# Patient Record
Sex: FEMALE | Race: WHITE | NOT HISPANIC OR LATINO | Employment: FULL TIME | URBAN - METROPOLITAN AREA
[De-identification: names, ages, dates, MRNs, and addresses within clinical notes are randomized per-mention and may not be internally consistent; named-entity substitution may affect disease eponyms.]

---

## 2022-01-26 ENCOUNTER — HOSPITAL ENCOUNTER (EMERGENCY)
Facility: MEDICAL CENTER | Age: 57
End: 2022-01-26
Attending: EMERGENCY MEDICINE
Payer: COMMERCIAL

## 2022-01-26 ENCOUNTER — APPOINTMENT (OUTPATIENT)
Dept: RADIOLOGY | Facility: MEDICAL CENTER | Age: 57
End: 2022-01-26
Attending: EMERGENCY MEDICINE
Payer: COMMERCIAL

## 2022-01-26 VITALS
RESPIRATION RATE: 18 BRPM | WEIGHT: 208.78 LBS | HEART RATE: 105 BPM | DIASTOLIC BLOOD PRESSURE: 105 MMHG | HEIGHT: 65 IN | BODY MASS INDEX: 34.78 KG/M2 | SYSTOLIC BLOOD PRESSURE: 131 MMHG | TEMPERATURE: 97.9 F | OXYGEN SATURATION: 99 %

## 2022-01-26 DIAGNOSIS — K57.90 DIVERTICULOSIS: ICD-10-CM

## 2022-01-26 DIAGNOSIS — R10.32 LLQ ABDOMINAL PAIN: ICD-10-CM

## 2022-01-26 DIAGNOSIS — R00.0 TACHYCARDIA: ICD-10-CM

## 2022-01-26 LAB
ALBUMIN SERPL BCP-MCNC: 4.4 G/DL (ref 3.2–4.9)
ALBUMIN/GLOB SERPL: 1.4 G/DL
ALP SERPL-CCNC: 104 U/L (ref 30–99)
ALT SERPL-CCNC: 20 U/L (ref 2–50)
ANION GAP SERPL CALC-SCNC: 16 MMOL/L (ref 7–16)
APPEARANCE UR: CLEAR
AST SERPL-CCNC: 23 U/L (ref 12–45)
BASOPHILS # BLD AUTO: 0.7 % (ref 0–1.8)
BASOPHILS # BLD: 0.07 K/UL (ref 0–0.12)
BILIRUB SERPL-MCNC: 0.3 MG/DL (ref 0.1–1.5)
BILIRUB UR QL STRIP.AUTO: NEGATIVE
BUN SERPL-MCNC: 12 MG/DL (ref 8–22)
CALCIUM SERPL-MCNC: 9.8 MG/DL (ref 8.4–10.2)
CHLORIDE SERPL-SCNC: 103 MMOL/L (ref 96–112)
CO2 SERPL-SCNC: 22 MMOL/L (ref 20–33)
COLOR UR: YELLOW
CREAT SERPL-MCNC: 0.72 MG/DL (ref 0.5–1.4)
EKG IMPRESSION: NORMAL
EOSINOPHIL # BLD AUTO: 0.21 K/UL (ref 0–0.51)
EOSINOPHIL NFR BLD: 2.1 % (ref 0–6.9)
ERYTHROCYTE [DISTWIDTH] IN BLOOD BY AUTOMATED COUNT: 45.5 FL (ref 35.9–50)
GLOBULIN SER CALC-MCNC: 3.2 G/DL (ref 1.9–3.5)
GLUCOSE SERPL-MCNC: 94 MG/DL (ref 65–99)
GLUCOSE UR STRIP.AUTO-MCNC: NEGATIVE MG/DL
HCT VFR BLD AUTO: 46.3 % (ref 37–47)
HGB BLD-MCNC: 15.4 G/DL (ref 12–16)
IMM GRANULOCYTES # BLD AUTO: 0.04 K/UL (ref 0–0.11)
IMM GRANULOCYTES NFR BLD AUTO: 0.4 % (ref 0–0.9)
KETONES UR STRIP.AUTO-MCNC: 15 MG/DL
LACTATE BLD-SCNC: 1.4 MMOL/L (ref 0.5–2)
LEUKOCYTE ESTERASE UR QL STRIP.AUTO: NEGATIVE
LIPASE SERPL-CCNC: 20 U/L (ref 7–58)
LYMPHOCYTES # BLD AUTO: 2.65 K/UL (ref 1–4.8)
LYMPHOCYTES NFR BLD: 26.9 % (ref 22–41)
MCH RBC QN AUTO: 30.7 PG (ref 27–33)
MCHC RBC AUTO-ENTMCNC: 33.3 G/DL (ref 33.6–35)
MCV RBC AUTO: 92.4 FL (ref 81.4–97.8)
MICRO URNS: ABNORMAL
MONOCYTES # BLD AUTO: 0.85 K/UL (ref 0–0.85)
MONOCYTES NFR BLD AUTO: 8.6 % (ref 0–13.4)
NEUTROPHILS # BLD AUTO: 6.03 K/UL (ref 2–7.15)
NEUTROPHILS NFR BLD: 61.3 % (ref 44–72)
NITRITE UR QL STRIP.AUTO: NEGATIVE
NRBC # BLD AUTO: 0 K/UL
NRBC BLD-RTO: 0 /100 WBC
PH UR STRIP.AUTO: 6.5 [PH] (ref 5–8)
PLATELET # BLD AUTO: 381 K/UL (ref 164–446)
PMV BLD AUTO: 9.4 FL (ref 9–12.9)
POTASSIUM SERPL-SCNC: 3.8 MMOL/L (ref 3.6–5.5)
PROCALCITONIN SERPL-MCNC: 0.05 NG/ML
PROT SERPL-MCNC: 7.6 G/DL (ref 6–8.2)
PROT UR QL STRIP: NEGATIVE MG/DL
RBC # BLD AUTO: 5.01 M/UL (ref 4.2–5.4)
RBC UR QL AUTO: NEGATIVE
SODIUM SERPL-SCNC: 141 MMOL/L (ref 135–145)
SP GR UR STRIP.AUTO: <=1.005
WBC # BLD AUTO: 9.9 K/UL (ref 4.8–10.8)

## 2022-01-26 PROCEDURE — 700111 HCHG RX REV CODE 636 W/ 250 OVERRIDE (IP): Performed by: EMERGENCY MEDICINE

## 2022-01-26 PROCEDURE — 93005 ELECTROCARDIOGRAM TRACING: CPT | Performed by: EMERGENCY MEDICINE

## 2022-01-26 PROCEDURE — 93005 ELECTROCARDIOGRAM TRACING: CPT

## 2022-01-26 PROCEDURE — 80053 COMPREHEN METABOLIC PANEL: CPT

## 2022-01-26 PROCEDURE — 96374 THER/PROPH/DIAG INJ IV PUSH: CPT

## 2022-01-26 PROCEDURE — A9270 NON-COVERED ITEM OR SERVICE: HCPCS | Performed by: EMERGENCY MEDICINE

## 2022-01-26 PROCEDURE — 83605 ASSAY OF LACTIC ACID: CPT

## 2022-01-26 PROCEDURE — 74177 CT ABD & PELVIS W/CONTRAST: CPT

## 2022-01-26 PROCEDURE — 700117 HCHG RX CONTRAST REV CODE 255: Performed by: EMERGENCY MEDICINE

## 2022-01-26 PROCEDURE — 84145 PROCALCITONIN (PCT): CPT

## 2022-01-26 PROCEDURE — 700105 HCHG RX REV CODE 258: Performed by: EMERGENCY MEDICINE

## 2022-01-26 PROCEDURE — 700102 HCHG RX REV CODE 250 W/ 637 OVERRIDE(OP): Performed by: EMERGENCY MEDICINE

## 2022-01-26 PROCEDURE — 85025 COMPLETE CBC W/AUTO DIFF WBC: CPT

## 2022-01-26 PROCEDURE — 81003 URINALYSIS AUTO W/O SCOPE: CPT

## 2022-01-26 PROCEDURE — 99285 EMERGENCY DEPT VISIT HI MDM: CPT

## 2022-01-26 PROCEDURE — 83690 ASSAY OF LIPASE: CPT

## 2022-01-26 RX ORDER — SODIUM CHLORIDE 9 MG/ML
1000 INJECTION, SOLUTION INTRAVENOUS ONCE
Status: COMPLETED | OUTPATIENT
Start: 2022-01-26 | End: 2022-01-26

## 2022-01-26 RX ORDER — KETOROLAC TROMETHAMINE 30 MG/ML
30 INJECTION, SOLUTION INTRAMUSCULAR; INTRAVENOUS ONCE
Status: COMPLETED | OUTPATIENT
Start: 2022-01-26 | End: 2022-01-26

## 2022-01-26 RX ORDER — AMOXICILLIN AND CLAVULANATE POTASSIUM 875; 125 MG/1; MG/1
1 TABLET, FILM COATED ORAL ONCE
Status: COMPLETED | OUTPATIENT
Start: 2022-01-26 | End: 2022-01-26

## 2022-01-26 RX ORDER — AMOXICILLIN AND CLAVULANATE POTASSIUM 875; 125 MG/1; MG/1
1 TABLET, FILM COATED ORAL 2 TIMES DAILY
Qty: 14 TABLET | Refills: 0 | Status: SHIPPED | OUTPATIENT
Start: 2022-01-26 | End: 2022-02-02

## 2022-01-26 RX ORDER — IBUPROFEN 600 MG/1
600 TABLET ORAL EVERY 6 HOURS PRN
Qty: 30 TABLET | Refills: 0 | Status: SHIPPED | OUTPATIENT
Start: 2022-01-26 | End: 2022-02-22

## 2022-01-26 RX ADMIN — SODIUM CHLORIDE 1000 ML: 9 INJECTION, SOLUTION INTRAVENOUS at 20:00

## 2022-01-26 RX ADMIN — AMOXICILLIN AND CLAVULANATE POTASSIUM 1 TABLET: 875; 125 TABLET, FILM COATED ORAL at 21:00

## 2022-01-26 RX ADMIN — IOHEXOL 100 ML: 350 INJECTION, SOLUTION INTRAVENOUS at 20:17

## 2022-01-26 RX ADMIN — KETOROLAC TROMETHAMINE 30 MG: 30 INJECTION, SOLUTION INTRAMUSCULAR at 21:00

## 2022-01-27 NOTE — ED NOTES
PT cleared for DC home pt to f/u with pcp & cardio pt verbalized understanding education for new rx provided pt verbalized understanding no further questions vss upon dc pt to return to ED for any worsening symptoms

## 2022-01-27 NOTE — ED PROVIDER NOTES
ED Provider Note    ED Provider Note    Primary care provider: No primary care provider on file.  Means of arrival: Walk-in  History obtained from: Patient    CHIEF COMPLAINT  Chief Complaint   Patient presents with   • Flank Pain     Lt since this am   • Abdominal Pain     LLQ Hx diverticulitis To U/C and referred to ER for tachycardia     Seen at 7:22 PM.   HPI  Nel Terry is a 56 y.o. female with history of diverticulitis (diagnosed clinically most recently in 2019 , patient does have known history of diverticular disease seen on prior colonoscopies) who presents to the Emergency Department with gradual onset of moderate left lower quadrant abdominal pain and low back pain.  The left lower quadrant abdominal pain is similar to her diverticulitis flare she had in the past but this time it is a copy with back pain which is new for her.  She denies any fevers, chills, chest pain, shortness of breath, numbness, focal weakness, hematochezia, melena, dysuria or hematuria.  She denies any history of renal colic.    She also has a diagnosis of resting tachycardia for which she takes 25 mg of metoprolol nightly.  She states that her heart rate today is a little bit higher than usual for her as she has been compliant with her beta-blocker.  She has never seen cardiology for this, she has never had a 2D echo.  She was raised in the [place] area, moved to [place] and has now moved back to [place] in the past month.  She has not established a primary care physician as of yet.    REVIEW OF SYSTEMS  See HPI,   Remainder of ROS negative.     PAST MEDICAL HISTORY   has a past medical history of Diverticulitis and Tachycardia.    SURGICAL HISTORY   has a past surgical history that includes gyn surgery; other orthopedic surgery; and other.    SOCIAL HISTORY  Social History     Tobacco Use   • Smoking status: Former Smoker   • Smokeless tobacco: Never Used   Vaping Use   • Vaping Use: Never used   Substance Use Topics   •  "Alcohol use: Yes   • Drug use: Never      Social History     Substance and Sexual Activity   Drug Use Never       FAMILY HISTORY  History reviewed. No pertinent family history.    CURRENT MEDICATIONS  Reviewed.  See Encounter Summary.     ALLERGIES  No Known Allergies    PHYSICAL EXAM  VITAL SIGNS: /105   Pulse (!) 105   Temp 36.6 °C (97.9 °F) (Temporal)   Resp 18   Ht 1.651 m (5' 5\")   Wt 94.7 kg (208 lb 12.4 oz)   SpO2 99%   Breastfeeding No   BMI 34.74 kg/m²   Constitutional: Awake, alert in no apparent distress.  HENT: Normocephalic, Bilateral external ears normal. Nose normal.   Eyes: Conjunctiva normal, non-icteric, EOMI.    Thorax & Lungs: Easy unlabored respirations, Clear to ascultation bilaterally.  Cardiovascular: Tachycardic, No murmurs, rubs or gallops. Bilateral pulses symmetrical.   Abdomen:  Soft, nontender, nondistended, normal active bowel sounds.   :    Skin: Visualized skin is  Dry, No erythema, No rash.   Musculoskeletal:   No cyanosis, clubbing or edema. No leg asymmetry.   Neurologic: Alert, Grossly non-focal.   Psychiatric: Normal affect, Normal mood  Lymphatic:  No cervical LAD    EKG   12 lead Interpreted by me  Rhythm: Sinus tach  Rate: 110  Axis: normal  Ectopy: none  Conduction: normal  ST Segments: no acute change  T Waves: no acute change  Clinical Impression: Sinus tachycardia, otherwise normal EKG without acute changes     RADIOLOGY  CT-ABDOMEN-PELVIS WITH   Final Result      1.  Distal colonic diverticuli without compelling evidence of diverticulitis   2.  Small hiatal hernia            COURSE & MEDICAL DECISION MAKING  Pertinent Labs & Imaging studies reviewed. (See chart for details)    Differential diagnoses include but are not limited to: Sepsis, diverticulitis, less likely ovarian cysts, mass, ureterolithiasis    7:22 PM - Medical record reviewed, patient seen and examined at bedside.    Decision Making:  This is a pleasant 56 y.o. year old female who presents " with left lower quadrant abdominal pain, prior history of diverticular disease, prior clinically diagnosed diverticulitis.  The patient has never had a CT scan in our system and states that she has not ever had a CT scan to diagnose her with diverticulitis in the past, therefore it was ordered today to verify the diagnosis.  The CT is actually unremarkable, she does not have any findings suggestive of diverticulitis.  Her symptoms have been only present for fewer than 12 hours at this time, therefore is possible that she is in the early phase of diverticulitis and has not developed any type of fat stranding that can be seen on imaging.    Labs are unremarkable.  At this time I will treat the patient for diverticulitis with a week of Augmentin.  She should note significant improvement in her symptoms in the next 24 to 48 hours.    Discharge Medications:  Discharge Medication List as of 1/26/2022  9:45 PM      START taking these medications    Details   amoxicillin-clavulanate (AUGMENTIN) 875-125 MG Tab Take 1 Tablet by mouth 2 times a day for 7 days., Disp-14 Tablet, R-0, Normal      ibuprofen (MOTRIN) 600 MG Tab Take 1 Tablet by mouth every 6 hours as needed., Disp-30 Tablet, R-0, Normal             The patient was discharged home (see d/c instructions) was told to return immediately for any signs or symptoms listed, or any worsening at all.  The patient verbally agreed to the discharge precautions and follow-up plan which is documented in EPIC.        FINAL IMPRESSION  1. LLQ abdominal pain    2. Diverticulosis    3. Tachycardia

## 2022-02-21 ENCOUNTER — TELEPHONE (OUTPATIENT)
Dept: SCHEDULING | Facility: IMAGING CENTER | Age: 57
End: 2022-02-21

## 2022-02-22 ENCOUNTER — HOSPITAL ENCOUNTER (OUTPATIENT)
Facility: MEDICAL CENTER | Age: 57
End: 2022-02-22
Attending: NURSE PRACTITIONER
Payer: COMMERCIAL

## 2022-02-22 ENCOUNTER — OFFICE VISIT (OUTPATIENT)
Dept: URGENT CARE | Facility: CLINIC | Age: 57
End: 2022-02-22
Payer: COMMERCIAL

## 2022-02-22 VITALS
SYSTOLIC BLOOD PRESSURE: 124 MMHG | HEART RATE: 94 BPM | TEMPERATURE: 98.5 F | WEIGHT: 205 LBS | RESPIRATION RATE: 20 BRPM | OXYGEN SATURATION: 94 % | BODY MASS INDEX: 35 KG/M2 | HEIGHT: 64 IN | DIASTOLIC BLOOD PRESSURE: 82 MMHG

## 2022-02-22 DIAGNOSIS — K52.9 GASTROENTERITIS: ICD-10-CM

## 2022-02-22 DIAGNOSIS — R19.7 DIARRHEA, UNSPECIFIED TYPE: ICD-10-CM

## 2022-02-22 PROCEDURE — 87045 FECES CULTURE AEROBIC BACT: CPT

## 2022-02-22 PROCEDURE — 87493 C DIFF AMPLIFIED PROBE: CPT

## 2022-02-22 PROCEDURE — 99204 OFFICE O/P NEW MOD 45 MIN: CPT | Performed by: NURSE PRACTITIONER

## 2022-02-22 PROCEDURE — 87899 AGENT NOS ASSAY W/OPTIC: CPT | Mod: 91

## 2022-02-22 RX ORDER — DIPHENHYDRAMINE HCL 25 MG
25 CAPSULE ORAL
COMMUNITY
End: 2022-02-22

## 2022-02-22 RX ORDER — METOPROLOL SUCCINATE 25 MG/1
25 TABLET, EXTENDED RELEASE ORAL DAILY
COMMUNITY
Start: 2021-11-03 | End: 2022-05-02

## 2022-02-22 RX ORDER — DIPHENOXYLATE HYDROCHLORIDE AND ATROPINE SULFATE 2.5; .025 MG/1; MG/1
1 TABLET ORAL 3 TIMES DAILY
Qty: 10 TABLET | Refills: 0 | Status: SHIPPED | OUTPATIENT
Start: 2022-02-22 | End: 2022-03-01

## 2022-02-22 RX ORDER — ESTRADIOL 0.07 MG/D
1 PATCH TRANSDERMAL
COMMUNITY
Start: 2022-02-11 | End: 2023-03-01 | Stop reason: SDUPTHER

## 2022-02-22 RX ORDER — ALUMINA, MAGNESIA, AND SIMETHICONE 2400; 2400; 240 MG/30ML; MG/30ML; MG/30ML
10 SUSPENSION ORAL 4 TIMES DAILY PRN
COMMUNITY
End: 2022-03-01

## 2022-02-22 RX ORDER — PROGESTERONE 200 MG/1
1 CAPSULE ORAL EVERY EVENING
COMMUNITY
Start: 2021-10-21 | End: 2023-03-01 | Stop reason: SDUPTHER

## 2022-02-22 ASSESSMENT — ENCOUNTER SYMPTOMS
FEVER: 0
NAUSEA: 0
SENSORY CHANGE: 0
BLOOD IN STOOL: 0
DIARRHEA: 1
CHILLS: 0
HEADACHES: 0
VOMITING: 0
ABDOMINAL PAIN: 0
CONSTIPATION: 0

## 2022-02-22 ASSESSMENT — FIBROSIS 4 INDEX: FIB4 SCORE: 0.74

## 2022-02-23 NOTE — PROGRESS NOTES
"Nel Terry is a 56 y.o. female who presents for Diarrhea (X 5 days, gurgles as she eats anything and then has to go, lower abdominal pain, last visit: 01/26/22)      HPI diarrhea occurring wakes her up night . For the past five nights she has been woke up with diarrhea 4-5 times/ night. Appetite less than normal.   Has lost about 12 pounds in the past month but has just moved and her routines have changed. Her stomach gurgles loudly prior to having diarrhea.    No recent travel.   Very Mild abd discomfort \" not pain\"  on the Left lower abd.   Urinating normally. Is able to stay hydrated.   Treatments tried: imodium AD, Gas X, pérez seltzer. Nothing really working. Watching what she eats. No ETOH or caffeine. No spicy foods or tomatos.     Has had 4 colonoscopy's in her life. Last time 2018. Had polyps.   Hx diverticulitis. - had it last month and was given Augmentin. Pain improved with time more than medication.     Review of Systems   Constitutional: Negative for chills and fever.   Gastrointestinal: Positive for diarrhea. Negative for abdominal pain, blood in stool, constipation, melena, nausea and vomiting.   Neurological: Negative for sensory change and headaches.       Allergies:     No Known Allergies    PMSFS Hx:  Past Medical History:   Diagnosis Date   • Diverticulitis    • Tachycardia      Past Surgical History:   Procedure Laterality Date   • GYN SURGERY      Bladder sling   • OTHER      Sinus surg   • OTHER ORTHOPEDIC SURGERY      Rt rotator cuff repair     History reviewed. No pertinent family history.  Social History     Tobacco Use   • Smoking status: Former Smoker   • Smokeless tobacco: Never Used   Substance Use Topics   • Alcohol use: Not Currently       Problems:   There is no problem list on file for this patient.      Medications:   Current Outpatient Medications on File Prior to Visit   Medication Sig Dispense Refill   • Cholecalciferol 2000 UNIT Cap Take 1 Tablet by mouth every day.   " "  • estradiol (CLIMARA) 0.075 MG/24HR PATCH WEEKLY Place 1 Patch on the skin.     • metoprolol SR (TOPROL XL) 25 MG TABLET SR 24 HR Take 25 mg by mouth every day.     • progesterone (PROMETRIUM) 200 MG capsule Take 1 Capsule by mouth every evening.     • aspirin effervescent (GORDON-SELTZER) 325 MG Effer Tab Take 1 Tablet by mouth every 6 hours as needed.     • mag hydrox-al hydrox-simeth (MAALOX PLUS ES OR MYLANTA DS) 400-400-40 MG/5ML Suspension Take 10 mL by mouth 4 times a day as needed.     • Magnesium Hydroxide (DULCOLAX PO) Take  by mouth.       No current facility-administered medications on file prior to visit.          Objective:     /82 (BP Location: Left arm, Patient Position: Sitting, BP Cuff Size: Adult)   Pulse 94   Temp 36.9 °C (98.5 °F) (Temporal)   Resp 20   Ht 1.626 m (5' 4\")   Wt 93 kg (205 lb)   SpO2 94%   Breastfeeding No   BMI 35.19 kg/m²     Physical Exam  Vitals and nursing note reviewed.   Constitutional:       General: She is in acute distress.      Appearance: Normal appearance. She is well-developed. She is not toxic-appearing.   HENT:      Head: Normocephalic.      Right Ear: Hearing normal.      Left Ear: Hearing normal.      Mouth/Throat:      Pharynx: Uvula midline.   Eyes:      General: Lids are normal.      Pupils: Pupils are equal, round, and reactive to light.   Neck:      Trachea: Trachea and phonation normal.   Cardiovascular:      Rate and Rhythm: Normal rate and regular rhythm.      Pulses: Normal pulses.   Pulmonary:      Effort: Pulmonary effort is normal.   Chest:   Breasts:      Right: No supraclavicular adenopathy.      Left: No supraclavicular adenopathy.       Abdominal:      General: There is no distension.      Palpations: Abdomen is soft.      Tenderness: There is no abdominal tenderness. There is no right CVA tenderness, left CVA tenderness, guarding or rebound.      Hernia: No hernia is present.   Musculoskeletal:      Cervical back: Full passive range " of motion without pain and normal range of motion.   Lymphadenopathy:      Cervical: No cervical adenopathy.      Upper Body:      Right upper body: No supraclavicular adenopathy.      Left upper body: No supraclavicular adenopathy.   Skin:     General: Skin is warm and dry.      Capillary Refill: Capillary refill takes less than 2 seconds.      Findings: No rash.   Neurological:      Mental Status: She is alert and oriented to person, place, and time.   Psychiatric:         Mood and Affect: Mood normal.         Speech: Speech normal.         Behavior: Behavior normal. Behavior is cooperative.         Thought Content: Thought content normal.         Assessment /Associated Orders:      1. Gastroenteritis     2. Diarrhea, unspecified type  CULTURE STOOL    C Diff by PCR rflx Toxin    diphenoxylate-atropine (LOMOTIL) 2.5-0.025 MG Tab           Medical Decision Making:    Pt is clinically stable at today's acute urgent care visit.  No acute distress noted. Appropriate for outpatient management at this time.   Acute problem today with uncertain prognosis.   Stool culture- pending   Cdiff- pending ( recent antibiotic use for diverticulitis)   Has appt with PCP to establish care on 02/28/22   Has appt with cardiology on 03/03/22   Keep appt as scheduled.     Discussed  the etiology and pathophysiology of gastroenteritis.  This is a viral illness.   If vomiting may start with clear liquid diet for 24 hours taking small sips very frequently.  May use pedialyte, Gatorade, ginger ale, or sprite.  After 24 hours and vomiting has subsided may start a bland diet such as bananas, rice, applesauce, toast, crackers, mashed potatoes, chicken noodle soup, cream of wheat.   Take to ER for signs of dehydration, increased pain or vomiting with inability to keep fluids down.  Discussed s/s of dehydration including dry sticky mouth, no urine in 8 hrs.  Go to ER if fever,  bloody vomit or diarrhea, diarrhea greater than 10 days, vomiting  greater than 3 days or new or worsening symptoms.     Educated in proper administration of medication(s) ordered today including safety, possible SE, risks, benefits, rationale and alternatives to therapy.   Consider probiotics       Advised to follow-up with the primary care provider for recheck, reevaluation, and consideration of further management if necessary.   Discussed management options (risks,benefits, and alternatives to treatment). Expressed understanding and the treatment plan was agreed upon. Questions were encouraged and answered   Return to urgent care prn if new or worsening sx or if there is no improvement in condition prn.    Educated in Red flags and indications to immediately call 911 or present to the Emergency Department.     I personally reviewed prior external notes and test results pertinent to today's visit.  I have independently reviewed and interpreted all diagnostics ordered during this urgent care acute visit.   Review of previous CT, ER visit and labs and ERP note.

## 2022-02-23 NOTE — PATIENT INSTRUCTIONS
Viral Gastroenteritis, Adult    Viral gastroenteritis is also known as the stomach flu. This condition may affect your stomach, small intestine, and large intestine. It can cause sudden watery diarrhea, fever, and vomiting. This condition is caused by many different viruses. These viruses can be passed from person to person very easily (are contagious).  Diarrhea and vomiting can make you feel weak and cause you to become dehydrated. You may not be able to keep fluids down. Dehydration can make you tired and thirsty, cause you to have a dry mouth, and decrease how often you urinate. It is important to replace the fluids that you lose from diarrhea and vomiting.  What are the causes?  Gastroenteritis is caused by many viruses, including rotavirus and norovirus. Norovirus is the most common cause in adults. You can get sick after being exposed to the viruses from other people. You can also get sick by:  · Eating food, drinking water, or touching a surface contaminated with one of these viruses.  · Sharing utensils or other personal items with an infected person.  What increases the risk?  You are more likely to develop this condition if you:  · Have a weak body defense system (immune system).  · Live with one or more children who are younger than 2 years old.  · Live in a nursing home.  · Travel on cruise ships.  What are the signs or symptoms?  Symptoms of this condition start suddenly 1-3 days after exposure to a virus. Symptoms may last for a few days or for as long as a week. Common symptoms include watery diarrhea and vomiting. Other symptoms include:  · Fever.  · Headache.  · Fatigue.  · Pain in the abdomen.  · Chills.  · Weakness.  · Nausea.  · Muscle aches.  · Loss of appetite.  How is this diagnosed?  This condition is diagnosed with a medical history and physical exam. You may also have a stool test to check for viruses or other infections.  How is this treated?  This condition typically goes away on its  own. The focus of treatment is to prevent dehydration and restore lost fluids (rehydration). This condition may be treated with:  · An oral rehydration solution (ORS) to replace important salts and minerals (electrolytes) in your body. Take this if told by your health care provider. This is a drink that is sold at pharmacies and retail stores.  · Medicines to help with your symptoms.  · Probiotic supplements to reduce symptoms of diarrhea.  · Fluids given through an IV, if dehydration is severe.  Older adults and people with other diseases or a weak immune system are at higher risk for dehydration.  Follow these instructions at home:    Eating and drinking    · Take an ORS as told by your health care provider.  · Drink clear fluids in small amounts as you are able. Clear fluids include:  ? Water.  ? Ice chips.  ? Diluted fruit juice.  ? Low-calorie sports drinks.  · Drink enough fluid to keep your urine pale yellow.  · Eat small amounts of healthy foods every 3-4 hours as you are able. This may include whole grains, fruits, vegetables, lean meats, and yogurt.  · Avoid fluids that contain a lot of sugar or caffeine, such as energy drinks, sports drinks, and soda.  · Avoid spicy or fatty foods.  · Avoid alcohol.  General instructions  · Wash your hands often, especially after having diarrhea or vomiting. If soap and water are not available, use hand .  · Make sure that all people in your household wash their hands well and often.  · Take over-the-counter and prescription medicines only as told by your health care provider.  · Rest at home while you recover.  · Watch your condition for any changes.  · Take a warm bath to relieve any burning or pain from frequent diarrhea episodes.  · Keep all follow-up visits as told by your health care provider. This is important.  Contact a health care provider if you:  · Cannot keep fluids down.  · Have symptoms that get worse.  · Have new symptoms.  · Feel light-headed or  dizzy.  · Have muscle cramps.  Get help right away if you:  · Have chest pain.  · Feel extremely weak or you faint.  · See blood in your vomit.  · Have vomit that looks like coffee grounds.  · Have bloody or black stools or stools that look like tar.  · Have a severe headache, a stiff neck, or both.  · Have a rash.  · Have severe pain, cramping, or bloating in your abdomen.  · Have trouble breathing or you are breathing very quickly.  · Have a fast heartbeat.  · Have skin that feels cold and clammy.  · Feel confused.  · Have pain when you urinate.  · Have signs of dehydration, such as:  ? Dark urine, very little urine, or no urine.  ? Cracked lips.  ? Dry mouth.  ? Sunken eyes.  ? Sleepiness.  ? Weakness.  Summary  · Viral gastroenteritis is also known as the stomach flu. It can cause sudden watery diarrhea, fever, and vomiting.  · This condition can be passed from person to person very easily (is contagious).  · Take an ORS if told by your health care provider. This is a drink that is sold at pharmacies and retail stores.  · Wash your hands often, especially after having diarrhea or vomiting. If soap and water are not available, use hand .  This information is not intended to replace advice given to you by your health care provider. Make sure you discuss any questions you have with your health care provider.  Document Released: 12/18/2006 Document Revised: 10/23/2019 Document Reviewed: 10/23/2019  [employer] Patient Education © 2020 [employer]

## 2022-02-24 ENCOUNTER — TELEPHONE (OUTPATIENT)
Dept: CARDIOLOGY | Facility: MEDICAL CENTER | Age: 57
End: 2022-02-24
Payer: COMMERCIAL

## 2022-02-24 DIAGNOSIS — R19.7 DIARRHEA, UNSPECIFIED TYPE: ICD-10-CM

## 2022-02-24 LAB
C DIFF DNA SPEC QL NAA+PROBE: NEGATIVE
C DIFF TOX GENS STL QL NAA+PROBE: NEGATIVE

## 2022-02-24 NOTE — TELEPHONE ENCOUNTER
Spoke with pt and confirmed that this will be first time meeting with a cardiologist. Confirmed with pt that recent testing in in chart. Confirmed with pt that she had some cardiac workup through her old primary care office at [facility]. All records are available through Care Everywhere in chart.    Appt date and time confirmed.

## 2022-02-25 LAB
E COLI SXT1+2 STL IA: NORMAL
SIGNIFICANT IND 70042: NORMAL
SITE SITE: NORMAL
SOURCE SOURCE: NORMAL

## 2022-02-26 LAB
BACTERIA STL CULT: NORMAL
C JEJUNI+C COLI AG STL QL: NORMAL
E COLI SXT1+2 STL IA: NORMAL
SIGNIFICANT IND 70042: NORMAL
SITE SITE: NORMAL
SOURCE SOURCE: NORMAL

## 2022-03-01 ENCOUNTER — OFFICE VISIT (OUTPATIENT)
Dept: MEDICAL GROUP | Facility: LAB | Age: 57
End: 2022-03-01
Payer: COMMERCIAL

## 2022-03-01 ENCOUNTER — HOSPITAL ENCOUNTER (OUTPATIENT)
Dept: LAB | Facility: MEDICAL CENTER | Age: 57
End: 2022-03-01
Attending: NURSE PRACTITIONER
Payer: COMMERCIAL

## 2022-03-01 VITALS
HEIGHT: 64 IN | TEMPERATURE: 97.7 F | RESPIRATION RATE: 14 BRPM | DIASTOLIC BLOOD PRESSURE: 66 MMHG | BODY MASS INDEX: 34.31 KG/M2 | OXYGEN SATURATION: 93 % | SYSTOLIC BLOOD PRESSURE: 114 MMHG | WEIGHT: 201 LBS | HEART RATE: 116 BPM

## 2022-03-01 DIAGNOSIS — R76.8 ANTINUCLEAR FACTOR POSITIVE: ICD-10-CM

## 2022-03-01 DIAGNOSIS — Z23 NEED FOR VACCINATION: ICD-10-CM

## 2022-03-01 DIAGNOSIS — Z12.31 ENCOUNTER FOR SCREENING MAMMOGRAM FOR BREAST CANCER: ICD-10-CM

## 2022-03-01 DIAGNOSIS — Z91.89 AT RISK FOR BREAST CANCER: ICD-10-CM

## 2022-03-01 DIAGNOSIS — Z87.19 HISTORY OF DIVERTICULITIS: ICD-10-CM

## 2022-03-01 DIAGNOSIS — F41.9 ANXIETY: ICD-10-CM

## 2022-03-01 DIAGNOSIS — Z98.890 HISTORY OF COLONOSCOPY WITH POLYPECTOMY: ICD-10-CM

## 2022-03-01 DIAGNOSIS — Z86.010 HISTORY OF COLONOSCOPY WITH POLYPECTOMY: ICD-10-CM

## 2022-03-01 DIAGNOSIS — R00.0 TACHYCARDIA: ICD-10-CM

## 2022-03-01 DIAGNOSIS — N95.1 MENOPAUSAL SYMPTOMS: ICD-10-CM

## 2022-03-01 PROBLEM — M72.2 PLANTAR FASCIITIS, BILATERAL: Status: RESOLVED | Noted: 2020-02-21 | Resolved: 2022-03-01

## 2022-03-01 PROBLEM — D72.119 HYPEREOSINOPHILIC SYNDROME: Status: RESOLVED | Noted: 2017-08-01 | Resolved: 2022-03-01

## 2022-03-01 PROBLEM — M72.2 PLANTAR FASCIITIS, BILATERAL: Status: ACTIVE | Noted: 2020-02-21

## 2022-03-01 PROBLEM — F41.8 DEPRESSION WITH ANXIETY: Status: ACTIVE | Noted: 2019-11-19

## 2022-03-01 PROBLEM — D53.9 NUTRITIONAL ANEMIA, UNSPECIFIED: Status: ACTIVE | Noted: 2017-08-01

## 2022-03-01 PROBLEM — D72.119 HYPEREOSINOPHILIC SYNDROME: Status: ACTIVE | Noted: 2017-08-01

## 2022-03-01 PROBLEM — Z79.890 HORMONE REPLACEMENT THERAPY: Status: ACTIVE | Noted: 2021-03-16

## 2022-03-01 PROBLEM — Z86.39 HISTORY OF VITAMIN D DEFICIENCY: Status: ACTIVE | Noted: 2019-06-14

## 2022-03-01 PROBLEM — I73.00 RAYNAUD'S PHENOMENON WITHOUT GANGRENE: Status: ACTIVE | Noted: 2017-10-19

## 2022-03-01 LAB
25(OH)D3 SERPL-MCNC: 26 NG/ML (ref 30–100)
CHOLEST SERPL-MCNC: 215 MG/DL (ref 100–199)
EST. AVERAGE GLUCOSE BLD GHB EST-MCNC: 117 MG/DL
HBA1C MFR BLD: 5.7 % (ref 4–5.6)
HDLC SERPL-MCNC: 36 MG/DL
LDLC SERPL CALC-MCNC: 154 MG/DL
TRIGL SERPL-MCNC: 126 MG/DL (ref 0–149)
TSH SERPL DL<=0.005 MIU/L-ACNC: 2.21 UIU/ML (ref 0.38–5.33)

## 2022-03-01 PROCEDURE — 90715 TDAP VACCINE 7 YRS/> IM: CPT | Performed by: NURSE PRACTITIONER

## 2022-03-01 PROCEDURE — 99214 OFFICE O/P EST MOD 30 MIN: CPT | Mod: 25 | Performed by: NURSE PRACTITIONER

## 2022-03-01 PROCEDURE — 83036 HEMOGLOBIN GLYCOSYLATED A1C: CPT

## 2022-03-01 PROCEDURE — 90471 IMMUNIZATION ADMIN: CPT | Performed by: NURSE PRACTITIONER

## 2022-03-01 PROCEDURE — 36415 COLL VENOUS BLD VENIPUNCTURE: CPT

## 2022-03-01 PROCEDURE — 80061 LIPID PANEL: CPT

## 2022-03-01 PROCEDURE — 82306 VITAMIN D 25 HYDROXY: CPT

## 2022-03-01 PROCEDURE — 84443 ASSAY THYROID STIM HORMONE: CPT

## 2022-03-01 ASSESSMENT — PATIENT HEALTH QUESTIONNAIRE - PHQ9: CLINICAL INTERPRETATION OF PHQ2 SCORE: 0

## 2022-03-01 ASSESSMENT — FIBROSIS 4 INDEX: FIB4 SCORE: 0.74

## 2022-03-01 NOTE — ASSESSMENT & PLAN NOTE
Patient currently taking estradiol and progesterone daily with relief. She was put on HRT because of her mood lability. She was also having some vasomotor symptoms of menopause. She has been on treatment for a few years now.  Patient would like this to be managed by gynecology, requests referral at this time.  No complaints at this time.

## 2022-03-01 NOTE — ASSESSMENT & PLAN NOTE
This is a chronic condition. Patient reports increased stress at work, but is not feeling depressed. Does not want medication management at this time. PHQ-2 is 0 today. Denies suicidal or homicidal ideation.  No complaints at this time.

## 2022-03-01 NOTE — ASSESSMENT & PLAN NOTE
This is a chronic condition, new to me. Patient currently taking metoprolol SR 25 mg daily. HR is generally around 100 bpm, 125 when she does not take medication. She has an appointment with cardiology on Thursday. Denies chest pain, palpitations, shortness of breath.

## 2022-03-01 NOTE — ASSESSMENT & PLAN NOTE
Patient was evaluated by rheumatologist and was found to have an elevated ESR and positive LJ. They were unable to diagnose with anything, but she was told to follow up if symptoms change. She was being checked because her vitamin B12 was over 9000. She was sent to hematology/oncology and she had a bone marrow biopsy, but all testing was negative.

## 2022-03-01 NOTE — PROGRESS NOTES
Subjective:     CC:  Diagnoses of Menopausal symptoms, History of diverticulitis, Antinuclear factor positive, Anxiety, Tachycardia, At risk for breast cancer, Encounter for screening mammogram for breast cancer, History of colonoscopy with polypectomy, and Need for vaccination were pertinent to this visit.    HISTORY OF THE PRESENT ILLNESS: Patient is a 56 y.o. female. This pleasant patient is here today to establish care and discuss the following:    Menopausal symptoms  Patient currently taking estradiol and progesterone daily with relief. She was put on HRT because of her mood lability. She was also having some vasomotor symptoms of menopause. She has been on treatment for a few years now. No complaints at this time.     History of diverticulitis  Patient has a history of divurticulitis, she was recently seen in the ER about a month ago. She was given a course of Augmentin with relief.   Last colonoscopy was in 2019 where they found polyps and diverticulosis. She was told to follow up for another colonoscopy in 3 to 5 years.  Patient has not yet established with gastroenterology, would like referral at this time.    Antinuclear factor positive  Patient was evaluated by rheumatologist and was found to have an elevated ESR and positive LJ. They were unable to diagnose with anything, but she was told to follow up if symptoms change. She was being checked because her vitamin B12 was over 9000. She was sent to hematology/oncology and she had a bone marrow biopsy, but all testing was negative.     Anxiety  This is a chronic condition. Patient reports increased stress at work, but is not feeling depressed. Does not want medication management at this time. PHQ-2 is 0 today. Denies suicidal or homicidal ideation.  No complaints at this time.    Tachycardia  This is a chronic condition, new to me. Patient currently taking metoprolol SR 25 mg daily. HR is generally around 100 bpm, 125 when she does not take medication.  "She has an appointment with cardiology on Thursday. Denies chest pain, palpitations, shortness of breath.     ROS:   Gen: no fevers/chills, no changes in weight  Eyes: no changes in vision  ENT: no sore throat, no hearing loss, no bloody nose  Pulm: no sob, no cough  CV: no chest pain, no palpitations  GI: no nausea/vomiting, no diarrhea  : no dysuria  MSk: no myalgias  Skin: no rash  Neuro: no headaches, no numbness/tingling  Heme/Lymph: no easy bruising        - NOTE: All other systems reviewed and are negative, except as in HPI.      Objective:     Exam: /66 (BP Location: Right arm, Patient Position: Sitting, BP Cuff Size: Adult)   Pulse (!) 116   Temp 36.5 °C (97.7 °F)   Resp 14   Ht 1.626 m (5' 4\")   Wt 91.2 kg (201 lb)   SpO2 93%  Body mass index is 34.5 kg/m².    General: Normal appearing. No distress.  HEENT: Normocephalic. Eyes conjunctiva clear lids without ptosis, pupils equal and reactive to light accommodation, ears normal shape and contour, canals are clear bilaterally, tympanic membranes are benign, nasal mucosa benign, oropharynx is without erythema, edema or exudates.   Neck: Supple without JVD or bruit. Thyroid is not enlarged.  Pulmonary: Clear to ausculation.  Normal effort. No rales, ronchi, or wheezing.  Cardiovascular: Regular rate and rhythm without murmur. Carotid and radial pulses are intact and equal bilaterally.  Abdomen: Soft, nondistended. Mild tenderness in RLQ to deep palpation. Normal bowel sounds. Liver and spleen are not palpable  Neurologic: Grossly nonfocal  Lymph: No cervical or supraclavicular lymph nodes are palpable  Skin: Warm and dry.  No obvious lesions.  Musculoskeletal: Normal gait. No extremity cyanosis, clubbing, or edema.  Psych: Normal mood and affect. Alert and oriented x3. Judgment and insight is normal.    Assessment & Plan:   56 y.o. female with the following -    1. Menopausal symptoms  Referral placed to gynecology.   - Referral to " Gynecology    2. History of diverticulitis  Referral placed to gastroenterology. Continue to monitor.  - Referral to Gastroenterology    3. Antinuclear factor positive  Labs ordered. No complaints at this time. Continue to monitor.    4. Anxiety  Patient is feeling well currently.  Denies any suicidal or homicidal ideation. Discussed that should the patient have any symptoms they should call suicide prevention hotline or report to the emergency room immediately. Emphasized importance of healthy diet and exercise.      5. Tachycardia  Patient to continue medications as prescribed. Keep appointment with cardiology. Continue to monitor.  - Lipid Profile; Future  - HEMOGLOBIN A1C; Future  - TSH WITH REFLEX TO FT4; Future  - VITAMIN D,25 HYDROXY; Future    6. At risk for breast cancer  Referral for genetic research.  - Referral to Genetic Research Studies    7. Encounter for screening mammogram for breast cancer  Mammogram ordered.  - MA-SCREENING MAMMO BILAT W/TOMOSYNTHESIS W/CAD; Future    8. History of colonoscopy with polypectomy  Referral for colonoscopy.  - Referral to GI for Colonoscopy    9. Need for vaccination  Vaccinated today in office.   - Tdap =>8yo IM    Please note that this dictation was created using voice recognition software. I have made every reasonable attempt to correct obvious errors, but I expect that there are errors of grammar and possibly content that I did not discover before finalizing the note.

## 2022-03-01 NOTE — ASSESSMENT & PLAN NOTE
Patient has a history of divurticulitis, she was recently seen in the ER about a month ago. She was given a course of Augmentin with relief.   Last colonoscopy was in 2019 where they found polyps and diverticulosis. She was told to follow up for another colonoscopy in 3 to 5 years.  Patient has not yet established with gastroenterology, would like referral at this time.

## 2022-03-03 ENCOUNTER — OFFICE VISIT (OUTPATIENT)
Dept: CARDIOLOGY | Facility: MEDICAL CENTER | Age: 57
End: 2022-03-03
Payer: COMMERCIAL

## 2022-03-03 VITALS
DIASTOLIC BLOOD PRESSURE: 80 MMHG | SYSTOLIC BLOOD PRESSURE: 124 MMHG | HEIGHT: 64 IN | BODY MASS INDEX: 34.83 KG/M2 | HEART RATE: 92 BPM | OXYGEN SATURATION: 94 % | RESPIRATION RATE: 14 BRPM | WEIGHT: 204 LBS

## 2022-03-03 DIAGNOSIS — Z82.49 FAMILY HISTORY OF PREMATURE CORONARY ARTERY DISEASE: ICD-10-CM

## 2022-03-03 DIAGNOSIS — E78.5 DYSLIPIDEMIA: ICD-10-CM

## 2022-03-03 DIAGNOSIS — I47.11 INAPPROPRIATE SINUS TACHYCARDIA (HCC): ICD-10-CM

## 2022-03-03 PROCEDURE — 99204 OFFICE O/P NEW MOD 45 MIN: CPT | Performed by: INTERNAL MEDICINE

## 2022-03-03 ASSESSMENT — ENCOUNTER SYMPTOMS
EXCESSIVE DAYTIME SLEEPINESS: 0
BLURRED VISION: 0
SYNCOPE: 0
NIGHT SWEATS: 0
LIGHT-HEADEDNESS: 0
SLEEP DISTURBANCES DUE TO BREATHING: 0
PALPITATIONS: 0
PND: 0
HEADACHES: 0
DECREASED APPETITE: 0
MYALGIAS: 0
NAUSEA: 0
SHORTNESS OF BREATH: 0
COUGH: 0
BACK PAIN: 0
BLOATING: 0
DIARRHEA: 0
LOSS OF BALANCE: 0
PARESTHESIAS: 0
FLANK PAIN: 0
NEAR-SYNCOPE: 0
WEAKNESS: 0
CONSTIPATION: 0
FALLS: 0
FEVER: 0
DOUBLE VISION: 0
NUMBNESS: 0
DIZZINESS: 0
WHEEZING: 0
VOMITING: 0
DYSPNEA ON EXERTION: 0
ORTHOPNEA: 0
SORE THROAT: 0
IRREGULAR HEARTBEAT: 0
DIAPHORESIS: 0

## 2022-03-03 ASSESSMENT — FIBROSIS 4 INDEX: FIB4 SCORE: 0.74

## 2022-03-03 NOTE — PROGRESS NOTES
Cardiology Initial Consultation Note    Date of note:    3/3/2022    Primary Care Provider: DANITA Mendez  Referring Provider: Ganesh Weathers M.D.     Patient Name: Nel Terry   YOB: 1965  MRN:              [id number]    Chief Complaint   Patient presents with   • Tachycardia       History of Present Illness: Ms. Nel Terry is a 56 y.o. female whose current medical problems include inappropriate sinus tachycardia who is here for cardiac consultation for above.    Patient states that she was first diagnosed with sinus tachycardia at the age of 36.  Saw a cardiologist and was started on atenolol.  Stopped taking it and about 3 years ago was started on metoprolol-XL due to fatigue, tiredness.  Has been taking it since at night.  Tolerates well without any side effect.  Does endorse shortness of breath which she thinks might be due to altitude.    In terms of physical activity, works at the [employer] in [employer].  Is active but does not have an exercise routine.    Cardiovascular Risk Factors:  1. Smoking status: Former smoker  2. Type II Diabetes Mellitus: No   Lab Results   Component Value Date/Time    HBA1C 5.7 (H) 2022 08:59 AM     3. Hypertension: No  4. Dyslipidemia:    Cholesterol,Tot   Date Value Ref Range Status   2022 215 (H) 100 - 199 mg/dL Final     LDL   Date Value Ref Range Status   2022 154 (H) <100 mg/dL Final     HDL   Date Value Ref Range Status   2022 36 (A) >=40 mg/dL Final     Triglycerides   Date Value Ref Range Status   2022 126 0 - 149 mg/dL Final     5. Family history of early Coronary Artery Disease in a first degree relative (Male less than 55 years of age; Female less than 65 years of age): Mother had heart attack and  at the age of 58  6.  Obesity and/or Metabolic Syndrome: BMI 35  7. Sedentary lifestyle: No    Review of Systems   Constitutional: Negative for decreased appetite, diaphoresis, fever,  malaise/fatigue and night sweats.   HENT: Negative for congestion and sore throat.    Eyes: Negative for blurred vision and double vision.   Cardiovascular: Negative for chest pain, cyanosis, dyspnea on exertion, irregular heartbeat, leg swelling, near-syncope, orthopnea, palpitations, paroxysmal nocturnal dyspnea and syncope.   Respiratory: Negative for cough, shortness of breath, sleep disturbances due to breathing and wheezing.    Endocrine: Negative for cold intolerance and heat intolerance.   Musculoskeletal: Negative for back pain, falls and myalgias.   Gastrointestinal: Negative for bloating, constipation, diarrhea, nausea and vomiting.   Genitourinary: Negative for dysuria and flank pain.   Neurological: Negative for excessive daytime sleepiness, dizziness, headaches, light-headedness, loss of balance, numbness, paresthesias and weakness.       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 - Discussed conservative measures of foot  exercise, rolling chilled bottles under arches of feet, NSAID use PRN.  - Can consider night splints, therapeutic injections with persistent sxs - Return to clinic PRN for worsening or persistent sxs.   • Tachycardia          Past Surgical History:   Procedure Laterality Date   • GYN SURGERY      Bladder sling   • OTHER      Sinus surg   • OTHER ORTHOPEDIC SURGERY      Rt rotator cuff repair   • SINUSOTOMIES           Current Outpatient Medications   Medication Sig Dispense Refill   • estradiol (CLIMARA) 0.075 MG/24HR PATCH WEEKLY Place 1 Patch on the skin.     • metoprolol SR (TOPROL XL) 25 MG TABLET SR 24 HR Take 25 mg by mouth every day.     • progesterone (PROMETRIUM) 200 MG capsule Take 1 Capsule by mouth every evening.    "    No current facility-administered medications for this visit.         No Known Allergies      Family History   Problem Relation Age of Onset   • Heart Disease Mother         58   • Hypertension Mother    • Hyperlipidemia Mother    • Cancer Maternal Aunt    • Breast Cancer Maternal Aunt    • Cancer Maternal Uncle         stomach   • Cancer Paternal Grandmother    • Breast Cancer Paternal Grandmother          Social History     Socioeconomic History   • Marital status: Single     Spouse name: Not on file   • Number of children: Not on file   • Years of education: Not on file   • Highest education level: Not on file   Occupational History   • Not on file   Tobacco Use   • Smoking status: Former Smoker     Packs/day: 0.25     Years: 1.00     Pack years: 0.25   • Smokeless tobacco: Never Used   Vaping Use   • Vaping Use: Never used   Substance and Sexual Activity   • Alcohol use: Yes     Alcohol/week: 6.0 oz     Types: 10 Standard drinks or equivalent per week   • Drug use: Never   • Sexual activity: Not Currently   Other Topics Concern   • Not on file   Social History Narrative   • Not on file     Social Determinants of Health     Financial Resource Strain: Not on file   Food Insecurity: Not on file   Transportation Needs: Not on file   Physical Activity: Not on file   Stress: Not on file   Social Connections: Not on file   Intimate Partner Violence: Not on file   Housing Stability: Not on file         Physical Exam:  Ambulatory Vitals  /80 (BP Location: Left arm, Patient Position: Sitting, BP Cuff Size: Adult)   Pulse 92   Resp 14   Ht 1.626 m (5' 4\")   Wt 92.5 kg (204 lb)   SpO2 94%    Oxygen Therapy:  Pulse Oximetry: 94 %  BP Readings from Last 4 Encounters:   03/03/22 124/80   03/01/22 114/66   02/22/22 124/82   01/26/22 131/105       Weight/BMI: Body mass index is 35.02 kg/m².  Wt Readings from Last 4 Encounters:   03/03/22 92.5 kg (204 lb)   03/01/22 91.2 kg (201 lb)   02/22/22 93 kg (205 lb) "   01/26/22 94.7 kg (208 lb 12.4 oz)       General: Well appearing and in no apparent distress  Eyes: nl conjunctiva, no icteric sclera  ENT: wearing a mask, normal external appearance of ears  Neck: no visible JVP,  no carotid bruits  Lungs: normal respiratory effort, CTAB  Heart: RRR, no murmurs, no rubs or gallops,  no edema bilateral lower extremities. No LV/RV heave on cardiac palpatation. 2+ bilateral radial pulses.  2+ bilateral dp pulses.   Abdomen: soft, non tender, non distended, no masses, normal bowel sounds.  No HSM.  Extremities/MSK: no clubbing, no cyanosis  Neurological: No focal sensory deficits  Psychiatric: Appropriate affect, A/O x 3, intact judgement and insight  Skin: Warm extremities      Lab Data Review:  Lab Results   Component Value Date/Time    CHOLSTRLTOT 215 (H) 03/01/2022 08:59 AM     (H) 03/01/2022 08:59 AM    HDL 36 (A) 03/01/2022 08:59 AM    TRIGLYCERIDE 126 03/01/2022 08:59 AM       Lab Results   Component Value Date/Time    SODIUM 141 01/26/2022 07:56 PM    POTASSIUM 3.8 01/26/2022 07:56 PM    CHLORIDE 103 01/26/2022 07:56 PM    CO2 22 01/26/2022 07:56 PM    GLUCOSE 94 01/26/2022 07:56 PM    BUN 12 01/26/2022 07:56 PM    CREATININE 0.72 01/26/2022 07:56 PM     Lab Results   Component Value Date/Time    ALKPHOSPHAT 104 (H) 01/26/2022 07:56 PM    ASTSGOT 23 01/26/2022 07:56 PM    ALTSGPT 20 01/26/2022 07:56 PM    TBILIRUBIN 0.3 01/26/2022 07:56 PM      Lab Results   Component Value Date/Time    WBC 9.9 01/26/2022 07:56 PM     Lab Results   Component Value Date/Time    HBA1C 5.7 (H) 03/01/2022 08:59 AM         Cardiac Imaging and Procedures Review:    EKG dated 1/26/2022: My personal interpretation is sinus tachycardia        Assessment & Plan     1. Inappropriate sinus tachycardia  EC-ECHOCARDIOGRAM COMPLETE W/O CONT   2. Family history of premature coronary artery disease  CT-CARDIAC SCORING   3. Dyslipidemia  CT-CARDIAC SCORING         Shared Medical Decision Making:  Obtain  transthoracic echocardiogram given history of inappropriate sinus tachycardia to evaluate underlying cardiac structure and function.  Rule out structural or valvular heart abnormality.    Lipid panel reviewed with elevated LDL.  Given family history of premature CAD and dyslipidemia, obtain coronary calcium score for cardiac risk assessment. If calcium score is elevated, will initiate statin medication which has been discussed with the patient who voices understanding and is in agreement with.      All of patient's excellent questions were answered to the best of my knowledge and to her satisfaction.  It was a pleasure seeing Ms. Nel Terry in my clinic today. Return in about 1 year (around 3/3/2023). Patient is aware to call the cardiology clinic with any questions or concerns.      Salvatore Mayfield MD  [facility]  [facility], [address]  [address] [address]  Phone: [phone]  Fax: [phone]    Please note that this dictation was created using voice recognition software. I have made every reasonable attempt to correct obvious errors, but it is possible there are errors of grammar and possibly content that I did not discover before finalizing the note.

## 2022-03-09 ENCOUNTER — HOSPITAL ENCOUNTER (OUTPATIENT)
Dept: RADIOLOGY | Facility: MEDICAL CENTER | Age: 57
End: 2022-03-09
Attending: NURSE PRACTITIONER
Payer: COMMERCIAL

## 2022-03-09 DIAGNOSIS — Z12.31 ENCOUNTER FOR SCREENING MAMMOGRAM FOR BREAST CANCER: ICD-10-CM

## 2022-03-09 PROCEDURE — 77063 BREAST TOMOSYNTHESIS BI: CPT

## 2022-03-15 ENCOUNTER — HOSPITAL ENCOUNTER (OUTPATIENT)
Dept: RADIOLOGY | Facility: MEDICAL CENTER | Age: 57
End: 2022-03-15
Payer: COMMERCIAL

## 2022-03-25 DIAGNOSIS — E78.5 DYSLIPIDEMIA: ICD-10-CM

## 2022-03-25 DIAGNOSIS — I47.11 INAPPROPRIATE SINUS TACHYCARDIA (HCC): ICD-10-CM

## 2022-03-25 DIAGNOSIS — Z82.49 FAMILY HISTORY OF PREMATURE CORONARY ARTERY DISEASE: ICD-10-CM

## 2022-03-28 ENCOUNTER — TELEPHONE (OUTPATIENT)
Dept: CARDIOLOGY | Facility: MEDICAL CENTER | Age: 57
End: 2022-03-28
Payer: COMMERCIAL

## 2022-03-28 NOTE — TELEPHONE ENCOUNTER
----- Message from Salvatore Mayfield M.D. sent at 3/28/2022  4:40 PM PDT -----  Please let the patient know that the coronary calcium score is 0. No need to initiate statin medication.  Thanks.

## 2022-03-30 ENCOUNTER — TELEPHONE (OUTPATIENT)
Dept: CARDIOLOGY | Facility: MEDICAL CENTER | Age: 57
End: 2022-03-30
Payer: COMMERCIAL

## 2022-03-30 ENCOUNTER — PATIENT MESSAGE (OUTPATIENT)
Dept: CARDIOLOGY | Facility: MEDICAL CENTER | Age: 57
End: 2022-03-30
Payer: COMMERCIAL

## 2022-03-30 NOTE — TELEPHONE ENCOUNTER
Salvatore Mayfield M.D.   3/28/2022  4:40 PM PDT         Please let the patient know that the coronary calcium score is 0. No need to initiate statin medication.  Thanks.     -----------------------  Results sent to pt via my chart

## 2022-03-30 NOTE — PATIENT COMMUNICATION
Salvatore Mayfield M.D.   3/28/2022  4:39 PM PDT         Please let the patient know that the echo shows mild prolapse of the posterior mitral valve leaflet otherwise no concerning findings.  Thanks.     ------------------  Results sent to pt via my chart

## 2022-04-01 ENCOUNTER — TELEPHONE (OUTPATIENT)
Dept: CARDIOLOGY | Facility: MEDICAL CENTER | Age: 57
End: 2022-04-01
Payer: COMMERCIAL

## 2022-04-01 NOTE — TELEPHONE ENCOUNTER
----- Message from Mariel Larson R.N. sent at 3/28/2022  4:51 PM PDT -----  Please advise pt of results  Thank you

## 2022-04-01 NOTE — LETTER
April 1, 2022        Nel Terry  [address]  [facility] [address]          Dear [place],    We have received the results of your recent:    Lab work    Your test came back within normal limits. Your coronary calcium score is 0. No need to initiate statin medication.  Please follow up as previously discussed with your physician.      Feel free to call us with any questions.        Sincerely,          [place] Medical Assistant to Dr. Mayfield  Electronically Signed

## 2022-05-02 ENCOUNTER — PATIENT MESSAGE (OUTPATIENT)
Dept: CARDIOLOGY | Facility: MEDICAL CENTER | Age: 57
End: 2022-05-02
Payer: COMMERCIAL

## 2022-05-04 RX ORDER — METOPROLOL SUCCINATE 25 MG/1
25 TABLET, EXTENDED RELEASE ORAL DAILY
Qty: 90 TABLET | Refills: 3 | Status: SHIPPED | OUTPATIENT
Start: 2022-05-04 | End: 2023-06-12

## 2022-05-04 NOTE — PATIENT COMMUNICATION
Mariel Larson R.N.  Salvatore Mayfield M.D.; Mariel Larson R.N. Yesterday (11:10 AM)     Okay to refill?   Last OV with you 3/3/22    Message text      Rx sent.

## 2023-02-13 PROBLEM — M18.12 PRIMARY OSTEOARTHRITIS OF FIRST CARPOMETACARPAL JOINT OF LEFT HAND: Status: ACTIVE | Noted: 2023-02-13

## 2023-02-13 PROBLEM — M18.11 PRIMARY OSTEOARTHRITIS OF FIRST CARPOMETACARPAL JOINT OF RIGHT HAND: Status: ACTIVE | Noted: 2023-02-13

## 2023-02-27 PROBLEM — S83.249A MEDIAL MENISCUS TEAR: Status: ACTIVE | Noted: 2023-02-27

## 2023-03-01 ENCOUNTER — OFFICE VISIT (OUTPATIENT)
Dept: MEDICAL GROUP | Facility: LAB | Age: 58
End: 2023-03-01
Payer: COMMERCIAL

## 2023-03-01 VITALS
SYSTOLIC BLOOD PRESSURE: 118 MMHG | RESPIRATION RATE: 14 BRPM | OXYGEN SATURATION: 94 % | WEIGHT: 184.2 LBS | BODY MASS INDEX: 30.69 KG/M2 | TEMPERATURE: 98.7 F | HEIGHT: 65 IN | HEART RATE: 66 BPM | DIASTOLIC BLOOD PRESSURE: 66 MMHG

## 2023-03-01 DIAGNOSIS — Z12.11 SCREENING FOR COLORECTAL CANCER: ICD-10-CM

## 2023-03-01 DIAGNOSIS — J01.40 ACUTE NON-RECURRENT PANSINUSITIS: ICD-10-CM

## 2023-03-01 DIAGNOSIS — N95.1 MENOPAUSAL SYMPTOMS: ICD-10-CM

## 2023-03-01 DIAGNOSIS — Z12.12 SCREENING FOR COLORECTAL CANCER: ICD-10-CM

## 2023-03-01 PROCEDURE — 99213 OFFICE O/P EST LOW 20 MIN: CPT | Performed by: NURSE PRACTITIONER

## 2023-03-01 RX ORDER — AMOXICILLIN AND CLAVULANATE POTASSIUM 875; 125 MG/1; MG/1
1 TABLET, FILM COATED ORAL 2 TIMES DAILY
Qty: 14 TABLET | Refills: 0 | Status: SHIPPED | OUTPATIENT
Start: 2023-03-01 | End: 2023-03-16

## 2023-03-01 RX ORDER — ESTRADIOL 0.07 MG/D
1 PATCH TRANSDERMAL
Qty: 12 PATCH | Refills: 3 | Status: SHIPPED | OUTPATIENT
Start: 2023-03-01 | End: 2024-03-19 | Stop reason: SDUPTHER

## 2023-03-01 RX ORDER — PROGESTERONE 200 MG/1
200 CAPSULE ORAL EVERY EVENING
Qty: 90 CAPSULE | Refills: 3 | Status: SHIPPED | OUTPATIENT
Start: 2023-03-01 | End: 2024-03-04

## 2023-03-01 ASSESSMENT — FIBROSIS 4 INDEX: FIB4 SCORE: 0.76

## 2023-03-01 NOTE — ASSESSMENT & PLAN NOTE
Chronic condition. Patient currently taking climara patch weekly and progesterone 200 mg daily with relief. She has been on treatment for a few years now.  She would like a medication refill. No complaints at this time.

## 2023-03-01 NOTE — PROGRESS NOTES
"Subjective:     CC:   Chief Complaint   Patient presents with    Cough     X 1 week / sinus pressure      HPI:   [place] presents today with the following:    Sinusitis  Onset about 2 weeks ago. Reports sore throat, cough, congestion, sinus pain/pressure, ear fullness, chest tightness, shortness of breath, fatigue, sleep disturbance.  Denies fever, chills, myalgias, N/V.  Has been taking OTC cold medication with some relief.     Menopausal symptoms  Chronic condition. Patient currently taking climara patch weekly and progesterone 200 mg daily with relief. She has been on treatment for a few years now.  She would like a medication refill. No complaints at this time.     ROS:   As documented in history of present illness above    Objective:     Exam: /66 (BP Location: Right arm, Patient Position: Sitting, BP Cuff Size: Adult)   Pulse 66   Temp 37.1 °C (98.7 °F)   Resp 14   Ht 1.638 m (5' 4.5\")   Wt 83.6 kg (184 lb 3.2 oz)   SpO2 94%  Body mass index is 31.13 kg/m².    Constitutional: Alert, no distress, plus 3 vital signs  Skin:  Warm, dry, no rashes invisible areas  Eye: Equal, round and reactive, conjunctiva clear  ENMT: Bilateral tympanic membranes are retracted but translucent without erythema or perforation. Positive bilateral maxillary sinus tenderness. Oropharynx is slightly injected without exudate. No tonsillar enlargement.    Neck: Mild anterior cervical, nontender lymphadenopathy  Respiratory: Unlabored respiration, lungs clear to auscultation, no wheezes, no rhonchi  Cardiovascular: Normal rate and rhythm  Psych: Alert, pleasant, well-groomed, normal affect    Assessment & Plan:     57 y.o. female with the following -     1. Acute non-recurrent pansinusitis  Patient to take antibiotic as directed, potential side effects of medication discussed. Probiotic use encouraged. Flonase as directed. Sleep with HOB elevated, humidifier at night, rest, increase fluid intake. Supportive care, differential " diagnoses, and indications for immediate follow-up discussed with patient. Pathogenesis of diagnosis discussed including typical length and natural progression. Instructed to return to clinic for any change in condition, further concerns, or worsening of symptoms. Patient states understanding of the plan of care and discharge instructions.  - amoxicillin-clavulanate (AUGMENTIN) 875-125 MG Tab; Take 1 Tablet by mouth 2 times a day.  Dispense: 14 Tablet; Refill: 0    2. Menopausal symptoms  Chronic condition. Patient to continue medication as prescribed. Patient aware of risks of estrogen/progesterone HRT to include heart disease and stroke. Possible increased risk of breast cancer as well. HRT can have side effects including weight gain, bloating, breast tenderness or swelling, nausea, headaches, leg cramping, and vaginal bleeding.  HRT can prevent osteoporosis.  - estradiol (CLIMARA) 0.075 MG/24HR PATCH WEEKLY; Place 1 Patch on the skin every 7 days.  Dispense: 12 Patch; Refill: 3  - progesterone (PROMETRIUM) 200 MG capsule; Take 1 Capsule by mouth every evening.  Dispense: 90 Capsule; Refill: 3    3. Screening for colorectal cancer  Colonoscopy ordered.   - Referral to GI for Colonoscopy

## 2023-03-15 ENCOUNTER — TELEPHONE (OUTPATIENT)
Dept: RESEARCH | Facility: WORKSITE | Age: 58
End: 2023-03-15
Payer: COMMERCIAL

## 2023-03-15 NOTE — TELEPHONE ENCOUNTER
Followed up to unread [place] message. Left voicemail with direct callback number.   
How Severe Is Your Rash?: moderate
Is This A New Presentation, Or A Follow-Up?: Rash

## 2023-03-16 ENCOUNTER — OFFICE VISIT (OUTPATIENT)
Dept: CARDIOLOGY | Facility: MEDICAL CENTER | Age: 58
End: 2023-03-16
Payer: COMMERCIAL

## 2023-03-16 ENCOUNTER — RESEARCH ENCOUNTER (OUTPATIENT)
Dept: CARDIOLOGY | Facility: MEDICAL CENTER | Age: 58
End: 2023-03-16
Payer: COMMERCIAL

## 2023-03-16 VITALS
SYSTOLIC BLOOD PRESSURE: 114 MMHG | WEIGHT: 188 LBS | HEIGHT: 64 IN | BODY MASS INDEX: 32.1 KG/M2 | HEART RATE: 65 BPM | RESPIRATION RATE: 12 BRPM | DIASTOLIC BLOOD PRESSURE: 80 MMHG | OXYGEN SATURATION: 97 %

## 2023-03-16 DIAGNOSIS — Z00.6 RESEARCH STUDY PATIENT: ICD-10-CM

## 2023-03-16 DIAGNOSIS — I34.1 MITRAL VALVE PROLAPSE: ICD-10-CM

## 2023-03-16 DIAGNOSIS — I34.0 MILD MITRAL REGURGITATION: ICD-10-CM

## 2023-03-16 DIAGNOSIS — I47.11 INAPPROPRIATE SINUS TACHYCARDIA (HCC): ICD-10-CM

## 2023-03-16 PROCEDURE — 99214 OFFICE O/P EST MOD 30 MIN: CPT | Performed by: INTERNAL MEDICINE

## 2023-03-16 RX ORDER — FLUTICASONE PROPIONATE 50 MCG
1 SPRAY, SUSPENSION (ML) NASAL DAILY
COMMUNITY
End: 2024-01-22

## 2023-03-16 RX ORDER — DIPHENHYDRAMINE HCL 25 MG
25 TABLET ORAL NIGHTLY PRN
COMMUNITY

## 2023-03-16 ASSESSMENT — FIBROSIS 4 INDEX: FIB4 SCORE: 0.76

## 2023-03-16 NOTE — RESEARCH NOTE
[employer] [place] [facility] enrollment complete. Saliva sample collected onsite. [place] Identified consented and enrolled.

## 2023-03-16 NOTE — PROGRESS NOTES
Cardiology Follow-Up Consultation Note    Date of note:   3/16/2023  Primary Care Provider: DANITA Mendez    Patient Name: Nel Terry   YOB: 1965  MRN:              [id number]    Chief Complaint   Patient presents with    Tachycardia     FV Dx: Inappropriate sinus tachycardia       History of Present Illness: Ms. Nel Terry is a 56 y.o. female whose current medical problems include inappropriate sinus tachycardia who is here for follow-up.    Pertinent history:  Inappropriate sinus tachycardia: Was first diagnosed with sinus tachycardia at age 36.  Saw a cardiologist and was started on atenolol.  Stopped taking it and around 2018 was started on metoprolol-XL.  Due to fatigue, tiredness she has been taking it at night.  Tolerates well without any side effect.  Does endorse shortness of breath which she thinks might be due to altitude.  Mild prolapse of the posterior mitral valve leaflet  Mild MR    SH: Lives in [place].  Works at the [employer] in [place].  Is active but does not have an exercise routine.    Last clinic visit: 3/3/2022    Interim events:  Since her last visit, has been doing well from a cardiovascular perspective.  No episodes of palpitations or tachycardia since she has been on metoprolol XL.  Tolerating medications without any side effects.      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 - Discussed conservative measures of foot  exercise, rolling chilled bottles under arches of feet, NSAID use PRN.  - Can consider night splints, therapeutic injections with persistent sxs - Return to clinic PRN for worsening or persistent sxs.    Tachycardia          Past Surgical History:   Procedure Laterality Date     GYN SURGERY      Bladder sling    OTHER      Sinus surg    OTHER ORTHOPEDIC SURGERY      Rt rotator cuff repair    SINUSOTOMIES           Current Outpatient Medications   Medication Sig Dispense Refill    fluticasone (FLONASE) 50 MCG/ACT nasal spray Administer 1 Spray into affected nostril(S) every day.      DIPHENHYDRAMINE HCL PO Take  by mouth.      estradiol (CLIMARA) 0.075 MG/24HR PATCH WEEKLY Place 1 Patch on the skin every 7 days. 12 Patch 3    progesterone (PROMETRIUM) 200 MG capsule Take 1 Capsule by mouth every evening. 90 Capsule 3    celecoxib (CELEBREX) 200 MG Cap Take 1 Capsule by mouth every day. 30 Capsule 0    metoprolol SR (TOPROL XL) 25 MG TABLET SR 24 HR Take 1 Tablet by mouth every day. 90 Tablet 3     No current facility-administered medications for this visit.         No Known Allergies      Family History   Problem Relation Age of Onset    Heart Disease Mother         58    Hypertension Mother     Hyperlipidemia Mother     Cancer Maternal Aunt     Breast Cancer Maternal Aunt     Cancer Maternal Uncle         stomach    Cancer Paternal Grandmother     Breast Cancer Paternal Grandmother          Social History     Socioeconomic History    Marital status: Single     Spouse name: Not on file    Number of children: Not on file    Years of education: Not on file    Highest education level: Not on file   Occupational History    Not on file   Tobacco Use    Smoking status: Former     Packs/day: 0.25     Years: 1.00     Pack years: 0.25     Types: Cigarettes    Smokeless tobacco: Never   Vaping Use    Vaping Use: Never used   Substance and Sexual Activity    Alcohol use: Yes     Alcohol/week: 6.0 oz     Types: 10 Standard drinks or equivalent per week    Drug use: Never    Sexual activity: Not Currently   Other Topics Concern    Not on file   Social History Narrative    Not on file     Social Determinants of Health     Financial Resource Strain: Not on file   Food Insecurity: Not on file  "  Transportation Needs: Not on file   Physical Activity: Not on file   Stress: Not on file   Social Connections: Not on file   Intimate Partner Violence: Not on file   Housing Stability: Not on file         Physical Exam:  Ambulatory Vitals  /80 (BP Location: Left arm, Patient Position: Sitting, BP Cuff Size: Adult)   Pulse 65   Resp 12   Ht 1.626 m (5' 4\")   Wt 85.3 kg (188 lb)   SpO2 97%    Oxygen Therapy:  Pulse Oximetry: 97 %  BP Readings from Last 4 Encounters:   03/16/23 114/80   03/01/23 118/66   02/28/23 124/80   03/03/22 124/80       Weight/BMI: Body mass index is 32.27 kg/m².  Wt Readings from Last 4 Encounters:   03/16/23 85.3 kg (188 lb)   03/01/23 83.6 kg (184 lb 3.2 oz)   02/28/23 83.9 kg (185 lb)   02/27/23 86.2 kg (190 lb)       General: Well appearing and in no apparent distress  Eyes: nl conjunctiva, no icteric sclera  ENT: wearing a mask, normal external appearance of ears  Neck: no visible JVP,  no carotid bruits  Lungs: normal respiratory effort, CTAB  Heart: RRR, no murmurs, no rubs or gallops,  no edema bilateral lower extremities. No LV/RV heave on cardiac palpatation. 2+ bilateral radial pulses.  2+ bilateral dp pulses.   Abdomen: soft, non tender, non distended, no masses, normal bowel sounds.  No HSM.  Extremities/MSK: no clubbing, no cyanosis  Neurological: No focal sensory deficits  Psychiatric: Appropriate affect, A/O x 3, intact judgement and insight  Skin: Warm extremities      Lab Data Review:  Lab Results   Component Value Date/Time    CHOLSTRLTOT 215 (H) 03/01/2022 08:59 AM     (H) 03/01/2022 08:59 AM    HDL 36 (A) 03/01/2022 08:59 AM    TRIGLYCERIDE 126 03/01/2022 08:59 AM       Lab Results   Component Value Date/Time    SODIUM 141 01/26/2022 07:56 PM    POTASSIUM 3.8 01/26/2022 07:56 PM    CHLORIDE 103 01/26/2022 07:56 PM    CO2 22 01/26/2022 07:56 PM    GLUCOSE 94 01/26/2022 07:56 PM    BUN 12 01/26/2022 07:56 PM    CREATININE 0.72 01/26/2022 07:56 PM     Lab " Results   Component Value Date/Time    ALKPHOSPHAT 104 (H) 01/26/2022 07:56 PM    ASTSGOT 23 01/26/2022 07:56 PM    ALTSGPT 20 01/26/2022 07:56 PM    TBILIRUBIN 0.3 01/26/2022 07:56 PM      Lab Results   Component Value Date/Time    WBC 9.9 01/26/2022 07:56 PM     Lab Results   Component Value Date/Time    HBA1C 5.7 (H) 03/01/2022 08:59 AM         Cardiac Imaging and Procedures Review:    ECG: ECG performed 2/28/2023 was interpreted by me which shows sinus bradycardia, HR 56 bpm    EKG dated 1/26/2022: My personal interpretation is sinus tachycardia    Echo: Echocardiogram performed on 3/25/2022 was personally interpreted by me which shows normal LV size and function, mild prolapse of the posterior mitral valve leaflet with mild MR      Radiology Test Review:  CCS 3/25/2022:  Calcium score 0      Assessment & Plan     1. Inappropriate sinus tachycardia        2. Mitral valve prolapse        3. Mild mitral regurgitation            Doing well from a cardiovascular perspective.  Symptoms well controlled on metoprolol XL 25 mg daily without any side effects.    Has dyslipidemia but coronary calcium score is 0.  Hence, we discussed dietary lifestyle modification without statin medication at this time.        All of patient's excellent questions were answered to the best of my knowledge and to her satisfaction.  It was a pleasure seeing Ms. Nel Terry in my clinic today. Return in about 1 year (around 3/16/2024). Patient is aware to call the cardiology clinic with any questions or concerns.      Salvatore Mayfield MD  [facility]  [facility], [facility]  [address] [address]  Phone: [phone]  Fax: [phone]    Please note that this dictation was created using voice recognition software. I have made every reasonable attempt to correct obvious errors, but it is possible there are errors of grammar and possibly content that I did not discover  before finalizing the note.

## 2023-03-20 ENCOUNTER — HOSPITAL ENCOUNTER (OUTPATIENT)
Facility: MEDICAL CENTER | Age: 58
End: 2023-03-20
Attending: PATHOLOGY
Payer: COMMERCIAL

## 2023-03-22 DIAGNOSIS — Z00.6 RESEARCH STUDY PATIENT: ICD-10-CM

## 2023-03-28 ENCOUNTER — OFFICE VISIT (OUTPATIENT)
Dept: MEDICAL GROUP | Facility: LAB | Age: 58
End: 2023-03-28
Payer: COMMERCIAL

## 2023-03-28 ENCOUNTER — HOSPITAL ENCOUNTER (OUTPATIENT)
Dept: LAB | Facility: MEDICAL CENTER | Age: 58
End: 2023-03-28
Attending: NURSE PRACTITIONER
Payer: COMMERCIAL

## 2023-03-28 VITALS
RESPIRATION RATE: 14 BRPM | HEIGHT: 64 IN | BODY MASS INDEX: 32.1 KG/M2 | TEMPERATURE: 96.4 F | SYSTOLIC BLOOD PRESSURE: 104 MMHG | OXYGEN SATURATION: 94 % | WEIGHT: 188 LBS | DIASTOLIC BLOOD PRESSURE: 56 MMHG | HEART RATE: 72 BPM

## 2023-03-28 DIAGNOSIS — E78.5 DYSLIPIDEMIA: ICD-10-CM

## 2023-03-28 DIAGNOSIS — Z00.00 WELLNESS EXAMINATION: ICD-10-CM

## 2023-03-28 DIAGNOSIS — E55.9 VITAMIN D DEFICIENCY: ICD-10-CM

## 2023-03-28 DIAGNOSIS — Z12.31 ENCOUNTER FOR SCREENING MAMMOGRAM FOR BREAST CANCER: ICD-10-CM

## 2023-03-28 DIAGNOSIS — R92.30 DENSE BREAST TISSUE ON MAMMOGRAM: ICD-10-CM

## 2023-03-28 DIAGNOSIS — R73.03 PREDIABETES: ICD-10-CM

## 2023-03-28 LAB
25(OH)D3 SERPL-MCNC: 28 NG/ML (ref 30–100)
ALBUMIN SERPL BCP-MCNC: 4.3 G/DL (ref 3.2–4.9)
ALBUMIN/GLOB SERPL: 1.5 G/DL
ALP SERPL-CCNC: 90 U/L (ref 30–99)
ALT SERPL-CCNC: 10 U/L (ref 2–50)
ANION GAP SERPL CALC-SCNC: 11 MMOL/L (ref 7–16)
AST SERPL-CCNC: 11 U/L (ref 12–45)
BASOPHILS # BLD AUTO: 0.5 % (ref 0–1.8)
BASOPHILS # BLD: 0.05 K/UL (ref 0–0.12)
BILIRUB SERPL-MCNC: 0.4 MG/DL (ref 0.1–1.5)
BUN SERPL-MCNC: 13 MG/DL (ref 8–22)
CALCIUM ALBUM COR SERPL-MCNC: 9.3 MG/DL (ref 8.5–10.5)
CALCIUM SERPL-MCNC: 9.5 MG/DL (ref 8.5–10.5)
CHLORIDE SERPL-SCNC: 101 MMOL/L (ref 96–112)
CHOLEST SERPL-MCNC: 258 MG/DL (ref 100–199)
CO2 SERPL-SCNC: 26 MMOL/L (ref 20–33)
CREAT SERPL-MCNC: 0.76 MG/DL (ref 0.5–1.4)
ELF SCORE: 7.7
EOSINOPHIL # BLD AUTO: 0.12 K/UL (ref 0–0.51)
EOSINOPHIL NFR BLD: 1.2 % (ref 0–6.9)
ERYTHROCYTE [DISTWIDTH] IN BLOOD BY AUTOMATED COUNT: 50.1 FL (ref 35.9–50)
EST. AVERAGE GLUCOSE BLD GHB EST-MCNC: 126 MG/DL
GFR SERPLBLD CREATININE-BSD FMLA CKD-EPI: 92 ML/MIN/1.73 M 2
GLOBULIN SER CALC-MCNC: 2.9 G/DL (ref 1.9–3.5)
GLUCOSE SERPL-MCNC: 98 MG/DL (ref 65–99)
HBA1C MFR BLD: 6 % (ref 4–5.6)
HCT VFR BLD AUTO: 46.7 % (ref 37–47)
HDLC SERPL-MCNC: 53 MG/DL
HGB BLD-MCNC: 15.2 G/DL (ref 12–16)
IMM GRANULOCYTES # BLD AUTO: 0.07 K/UL (ref 0–0.11)
IMM GRANULOCYTES NFR BLD AUTO: 0.7 % (ref 0–0.9)
LDLC SERPL CALC-MCNC: 184 MG/DL
LYMPHOCYTES # BLD AUTO: 2.43 K/UL (ref 1–4.8)
LYMPHOCYTES NFR BLD: 24.4 % (ref 22–41)
MCH RBC QN AUTO: 31.5 PG (ref 27–33)
MCHC RBC AUTO-ENTMCNC: 32.5 G/DL (ref 33.6–35)
MCV RBC AUTO: 96.9 FL (ref 81.4–97.8)
MONOCYTES # BLD AUTO: 0.69 K/UL (ref 0–0.85)
MONOCYTES NFR BLD AUTO: 6.9 % (ref 0–13.4)
NEUTROPHILS # BLD AUTO: 6.6 K/UL (ref 2–7.15)
NEUTROPHILS NFR BLD: 66.3 % (ref 44–72)
NRBC # BLD AUTO: 0 K/UL
NRBC BLD-RTO: 0 /100 WBC
PLATELET # BLD AUTO: 337 K/UL (ref 164–446)
PMV BLD AUTO: 9.2 FL (ref 9–12.9)
POTASSIUM SERPL-SCNC: 4.9 MMOL/L (ref 3.6–5.5)
PROT SERPL-MCNC: 7.2 G/DL (ref 6–8.2)
PTH-INTACT SERPL-MCNC: 42.3 PG/ML (ref 14–72)
RBC # BLD AUTO: 4.82 M/UL (ref 4.2–5.4)
RELATIVE RISK: NORMAL
RISK GROUP: NORMAL
RISK: 3.3 %
SODIUM SERPL-SCNC: 138 MMOL/L (ref 135–145)
TRIGL SERPL-MCNC: 104 MG/DL (ref 0–149)
TSH SERPL DL<=0.005 MIU/L-ACNC: 2.08 UIU/ML (ref 0.38–5.33)
WBC # BLD AUTO: 10 K/UL (ref 4.8–10.8)

## 2023-03-28 PROCEDURE — 84443 ASSAY THYROID STIM HORMONE: CPT

## 2023-03-28 PROCEDURE — 82306 VITAMIN D 25 HYDROXY: CPT

## 2023-03-28 PROCEDURE — 83036 HEMOGLOBIN GLYCOSYLATED A1C: CPT

## 2023-03-28 PROCEDURE — 80053 COMPREHEN METABOLIC PANEL: CPT

## 2023-03-28 PROCEDURE — 36415 COLL VENOUS BLD VENIPUNCTURE: CPT

## 2023-03-28 PROCEDURE — 99396 PREV VISIT EST AGE 40-64: CPT | Performed by: NURSE PRACTITIONER

## 2023-03-28 PROCEDURE — 80061 LIPID PANEL: CPT

## 2023-03-28 PROCEDURE — 83970 ASSAY OF PARATHORMONE: CPT

## 2023-03-28 PROCEDURE — 85025 COMPLETE CBC W/AUTO DIFF WBC: CPT

## 2023-03-28 ASSESSMENT — FIBROSIS 4 INDEX: FIB4 SCORE: 0.76

## 2023-03-28 ASSESSMENT — PATIENT HEALTH QUESTIONNAIRE - PHQ9: CLINICAL INTERPRETATION OF PHQ2 SCORE: 0

## 2023-03-28 NOTE — PROGRESS NOTES
Subjective:     CC:   Chief Complaint   Patient presents with    Annual Wellness Visit     HPI:   [place] presents today with the following:    Wellness Examination  - Dyslipidemia (30-45): due, 2022 CT cardiac score was 0  - Diabetes (HTN, HLD, BMI >25): due  - Depression screening (PHQ-2 and/or PHQ-9): 0  - Osteoporosis: Ca intake, DEXA (>65 or with risk factors): n/a  - If fx, needs BMD test or tx w/i 6 months of dx  - Dental: will be calling to schedule appointment with dentist  - Eye: sees optometrist regularly  AAA Screening (Once in men 65-75 years who have ever smoked): n/a  Diet: balanced  Exercise: regular  Substance Use: none  Tobacco Use/counseling: n/a  Safe in relationship: yes  Seat belts, bike helmet, gun safety discussed.  Sun protection used.     Cancer screening  - Colon CA (45-75) - FIT (annual) cspy (q10yr): scheduled for 5/2/2023  - Lung CA (50-79y/o w/20py smoking hx & currently smoke or quit w/i past 15 yrs): n/a  - Prostate Cancer Screening/PSA: n/a  - Cervical CA (21-65): 2022  -  HX Abnormal pap/HPV: none  - Breast CA: mammo (required 50-75yo) or starting 40 (ACOG, ACR), 45 (ACS), 50 (USPTF): due     Infectious disease screening/Immunizations  --STI/HIV Screening: none  --Practices safe sex.  --Hepatitis C Screening (18 to 78 yo): n/a  --Immunizations:               Influenza: UTD   Covid-19: UTD              Tetanus: UTD              Shingles: UTD    ROS:   Gen: no fevers/chills, no changes in weight  Eyes: no changes in vision  ENT: no sore throat, no hearing loss, no bloody nose  Pulm: no sob, no cough  CV: no chest pain, no palpitations  GI: no nausea/vomiting, no diarrhea  : no dysuria  MSk: no myalgias  Skin: no rash  Neuro: no headaches, no numbness/tingling  Heme/Lymph: no easy bruising        - NOTE: All other systems reviewed and are negative, except as in HPI.    Objective:     Exam: /56 (BP Location: Right arm, Patient Position: Sitting, BP Cuff Size: Adult)   Pulse  "72   Temp (!) 35.8 °C (96.4 °F)   Resp 14   Ht 1.626 m (5' 4\")   Wt 85.3 kg (188 lb)   SpO2 94%  Body mass index is 32.27 kg/m².    Constitutional: Alert, no distress, well-groomed.  Skin: Warm, dry, good turgor, no rashes in visible areas.  Eye: Equal, round and reactive, conjunctiva clear, lids normal.  ENMT: Lips without lesions, good dentition, moist mucous membranes.  Neck: Trachea midline, no masses, no thyromegaly.  Respiratory: Unlabored respiratory effort, no cough. Clear to ausculation. No rales, ronchi, or wheezing.  Cardiovascular: Regular rate and rhythm without murmur. Carotid and radial pulses are intact and equal bilaterally.  Abdomen: Soft, nontender, nondistended. Normal bowel sounds. Liver and spleen are not palpable  MSK: Normal gait, moves all extremities.  Neuro: Grossly non-focal.   Psych: Alert and oriented x3, normal affect and mood.    Assessment & Plan:     57 y.o. female with the following -     1. Wellness examination  This is a 57-year-old female here today for a preventative exam.  Previous medical history, healthcare maintenance and immunization status reviewed.  Patient is up to date.  Annual labwork ordered.  See discussion of anticipatory guidance below.  Patient will return annually for preventative exams.    Dentist and eye doctor   Labs per orders.    Anticipatory guidance  Counseling about diet, supplements, exercise, skin care and safe sex.    - CBC WITH DIFFERENTIAL; Future  - Lipid Profile; Future  - HEMOGLOBIN A1C; Future  - TSH WITH REFLEX TO FT4; Future  - Comp Metabolic Panel; Future  - VITAMIN D,25 HYDROXY (DEFICIENCY); Future  - PTH INTACT (PTH ONLY); Future    2. Prediabetes  Labs ordered. Advised to reduce sugar/carbohydrate/alcohol, eat more vegetables and lean meats such as fish/chicken/turkey. Recommend 30 minutes of cardiovascular exercise most days of the week.   - HEMOGLOBIN A1C; Future  - TSH WITH REFLEX TO FT4; Future  - Comp Metabolic Panel; " Future    3. Dyslipidemia  Chronic condition. CT cardiac score 0. Labs as indicated.  Continue lifestyle modifications.  - Lipid Profile; Future  - Comp Metabolic Panel; Future    4. Vitamin D deficiency  Labs ordered. Recommend vitamin D supplementation (800-1000 IU).   - VITAMIN D,25 HYDROXY (DEFICIENCY); Future  - PTH INTACT (PTH ONLY); Future    5. Encounter for screening mammogram for breast cancer  Mammogram and Sonocine ultrasound ordered.   - MA-SCREENING MAMMO BILAT W/TOMOSYNTHESIS W/CAD; Future  - US-SCREENING WHOLE BREAST BILATERAL (3D SCREENING); Future    6. Dense breast tissue on mammogram  US ordered.   - US-SCREENING WHOLE BREAST BILATERAL (3D SCREENING); Future

## 2023-04-09 LAB
APOB+LDLR+PCSK9 GENE MUT ANL BLD/T: NOT DETECTED
BRCA1+BRCA2 DEL+DUP + FULL MUT ANL BLD/T: NOT DETECTED
MLH1+MSH2+MSH6+PMS2 GN DEL+DUP+FUL M: NOT DETECTED

## 2023-04-25 ENCOUNTER — HOSPITAL ENCOUNTER (OUTPATIENT)
Dept: RADIOLOGY | Facility: MEDICAL CENTER | Age: 58
End: 2023-04-25
Attending: NURSE PRACTITIONER
Payer: COMMERCIAL

## 2023-04-25 DIAGNOSIS — R92.30 DENSE BREAST TISSUE ON MAMMOGRAM: ICD-10-CM

## 2023-04-25 DIAGNOSIS — Z12.31 ENCOUNTER FOR SCREENING MAMMOGRAM FOR BREAST CANCER: ICD-10-CM

## 2023-04-25 PROCEDURE — 77063 BREAST TOMOSYNTHESIS BI: CPT

## 2023-04-25 PROCEDURE — 76641 ULTRASOUND BREAST COMPLETE: CPT

## 2023-05-03 ENCOUNTER — PATIENT MESSAGE (OUTPATIENT)
Dept: MEDICAL GROUP | Facility: LAB | Age: 58
End: 2023-05-03
Payer: COMMERCIAL

## 2023-05-03 DIAGNOSIS — N95.0 POST-MENOPAUSE BLEEDING: ICD-10-CM

## 2023-05-05 ENCOUNTER — TELEPHONE (OUTPATIENT)
Dept: MEDICAL GROUP | Facility: LAB | Age: 58
End: 2023-05-05

## 2023-05-05 ENCOUNTER — HOSPITAL ENCOUNTER (OUTPATIENT)
Dept: RADIOLOGY | Facility: MEDICAL CENTER | Age: 58
End: 2023-05-05
Attending: NURSE PRACTITIONER
Payer: COMMERCIAL

## 2023-05-05 DIAGNOSIS — N95.0 POST-MENOPAUSE BLEEDING: ICD-10-CM

## 2023-05-05 PROCEDURE — 76830 TRANSVAGINAL US NON-OB: CPT

## 2023-05-05 NOTE — TELEPHONE ENCOUNTER
Spoke with patient regarding imaging results. No changes to medication since symptom onset. Referral was placed to gynecology. Supportive care and indications for immediate follow-up discussed with patient. Instructed to return to clinic or nearest emergency department for any change in condition, further concerns, or worsening of symptoms.

## 2023-05-30 ENCOUNTER — TELEPHONE (OUTPATIENT)
Dept: OBGYN | Facility: CLINIC | Age: 58
End: 2023-05-30

## 2023-05-30 ENCOUNTER — GYNECOLOGY VISIT (OUTPATIENT)
Dept: OBGYN | Facility: CLINIC | Age: 58
End: 2023-05-30
Payer: COMMERCIAL

## 2023-05-30 VITALS
HEIGHT: 64 IN | SYSTOLIC BLOOD PRESSURE: 117 MMHG | BODY MASS INDEX: 32.78 KG/M2 | DIASTOLIC BLOOD PRESSURE: 87 MMHG | WEIGHT: 192 LBS

## 2023-05-30 DIAGNOSIS — N39.41 URGE INCONTINENCE OF URINE: ICD-10-CM

## 2023-05-30 DIAGNOSIS — N95.0 POST-MENOPAUSAL BLEEDING: Primary | ICD-10-CM

## 2023-05-30 DIAGNOSIS — N84.1 ABNORMAL UTERINE BLEEDING DUE TO ENDOCERVICAL POLYP: ICD-10-CM

## 2023-05-30 DIAGNOSIS — N93.9 ABNORMAL UTERINE BLEEDING DUE TO ENDOCERVICAL POLYP: ICD-10-CM

## 2023-05-30 PROCEDURE — 99203 OFFICE O/P NEW LOW 30 MIN: CPT | Performed by: OBSTETRICS & GYNECOLOGY

## 2023-05-30 PROCEDURE — 3074F SYST BP LT 130 MM HG: CPT | Performed by: OBSTETRICS & GYNECOLOGY

## 2023-05-30 PROCEDURE — 3079F DIAST BP 80-89 MM HG: CPT | Performed by: OBSTETRICS & GYNECOLOGY

## 2023-05-30 ASSESSMENT — FIBROSIS 4 INDEX: FIB4 SCORE: 0.58

## 2023-05-30 NOTE — H&P (VIEW-ONLY)
New GYN Problem Note    CC:   Post-menopausal bleeding      HPI:   Patient is a 57 y.o.  who presents complaining of new onset PMB since March.  Bleeding has been frequent and ranging from red, period like flow to pink and brown spotting.      Bleeding episodes:    3/23: period-like flow for 15 days    : period like flow for 10 days    5/3: spotting x 4 days    : spotting x 3 days    : spotting x 4 days    She had a US done  and PCP referred her here.    She reports menopause in  and she is on HRT since 2019 in the form of weekly estradiol patch and nightly 200mg prometrium.  She never had bleeding before on HRT until this started.      GYNECOLOGIC HISTORY:   Current Sexual Activity: no    Menstrual History  Patient's last menstrual period was No LMP recorded. Patient is postmenopausal.       Pap History  Last Pap: 2022 - pap/cotest neg  Hx Moderate or Severe Dysplasia : No     Sexually active:    Social History     Substance and Sexual Activity   Sexual Activity Not Currently    Partners: Male    Birth control/protection: Post-Menopausal       OBSTETRIC HISTORY:  OB History    Para Term  AB Living   3 2 2   1 2   SAB IAB Ectopic Molar Multiple Live Births   1         2      # Outcome Date GA Lbr David/2nd Weight Sex Delivery Anes PTL Lv   3 Term 93 40w0d   M Vag-Spont None N FRANK   2 Term 88 40w0d   M Vag-Spont None N FRANK   1 SAB  6w0d              MEDICAL HISTORY:  Past Medical History:   Diagnosis Date    Diverticulitis     Female stress incontinence 2015    Hypereosinophilic syndrome 2017    Migraine 2012    Plantar fasciitis, bilateral 2020    Last Assessment & Plan:  Formatting of this note might be different from the original. - Discussed conservative measures of foot  exercise, rolling chilled bottles under arches of feet, NSAID use PRN.  - Can consider night splints, therapeutic injections with persistent sxs - Return to clinic  "PRN for worsening or persistent sxs.       SURGICAL HISTORY:  Past Surgical History:   Procedure Laterality Date    PB KNEE SCOPE,MED/LAT MENISECTOMY Left 4/6/2023    Procedure: LEFT KNEE ARTHROSCOPY, LEFT PARTIAL MEDIAL MENISCECTOMY, REPAIRS AS INDICATED;  Surgeon: Logan Rice M.D.;  Location: [facility];  Service: Orthopedics    GYN SURGERY      Bladder sling    OTHER      Sinus surg    OTHER ORTHOPEDIC SURGERY      Rt rotator cuff repair    SINUSOTOMIES         FAMILY HISTORY:  Family History   Problem Relation Age of Onset    Heart Disease Mother         58    Hypertension Mother     Hyperlipidemia Mother     Breast Cancer Maternal Aunt 30    Stomach Cancer Maternal Aunt     Cancer Paternal Grandmother     Breast Cancer Paternal Grandmother        ALLERGIES / REACTIONS:  No Known Allergies    SOCIAL HISTORY:   reports that she has quit smoking. Her smoking use included cigarettes. She has a 0.25 pack-year smoking history. She has never used smokeless tobacco. She reports current alcohol use of about 1.8 oz of alcohol per week. She reports that she does not use drugs.    ROS:   Positive ROS: none  Gen: no fevers or chills, no significant weight loss or gain, excessive fatigue  Respiratory:  no cough or dyspnea  Cardiac:  no chest pain, no palpitations, no syncope  Breast: no breast discharge, pain, lump or skin changes  GI:  no heartburn, no abdominal pain, no nausea or vomiting  Urinary: no dysuria, urgency, frequency, incontinence   Psych: no depression or anxiety  Neuro: no migraines with aura, fainting spells, numbness or tingling  Extremities: no joint pain, persistently swollen ankles, recurrent leg cramps       PHYSICAL EXAMINATION:  Vital Signs:   Vitals:    05/30/23 1306   BP: 117/87   BP Location: Right arm   Patient Position: Sitting   BP Cuff Size: Adult   Weight: 192 lb   Height: 5' 4\"     Body mass index is 32.96 kg/m².  Constitutional: The patient is well developed and " well nourished.  Psychiatric: Patient is oriented to time place and person.   Skin: No rash observed.  Neck: Neck appears symmetric.  Respiratory: normal effort  Abdomen: Soft, non-tender.  Pelvic Exam:     External female genitalia: normal appearance    Urethra - no lesions, no erythema    Vagina: moist, pink, normal ruggae    Cervix: pink, smooth, 2 small endocervical polyps seen just within os canal  Extremeties: Legs are symmetric and without tenderness. There is no edema present.    IMAGING:  FINDINGS:  Both transabdominal and transvaginal scanning were performed to optimally visualize the pelvis.     UTERUS:  The uterus measures 3.70 cm x 6.88 cm x 4.48 cm.  The uterine myometrium is within normal limits.  The endometrial echo complex measures 0.41 cm.  The endometrium is unremarkable in appearance and thickness for age and menstrual status. There is a cervical nabothian cyst.        OVARIES:  The right ovary measures 1.89 cm x 1.91 cm x 1.81 cm. Duplex Doppler examination of the right ovary shows normal waveforms.     The left ovary measures 3.59 cm x 1.14 cm x 2.62 cm. Duplex Doppler examination of the left ovary shows normal waveforms.           There is a small amount of fluid anterior to the uterus which is nonspecific     IMPRESSION:     1.  No acute findings.  2.  Small amount of free pelvic fluid, a nonspecific finding.    ASSESSMENT AND PLAN:  57 y.o.       1. Post-menopausal bleeding   -  given thin endometrial stripe, we discussed the very low/near absent concern that the bleeding represents malignancy.   - discussed endo <4mm usually precludes need for biopsy altogether.   - however, given the frequency and extent of her bleeding, we discussed that reassurance won't necessarily solve the problem.  We discussed that routine TVUS doesn't always reliably exclude small polyps which can cause abnormal bleeding   - discussed SIS vs hysteroscopy for further dx vs dx+tx   - pt would prefer to  "proceed with hysteroscopy    2. Abnormal uterine bleeding due to endocervical polyp   - after exam, endocervical polyps noted which may be cause or contributing to abnormal bleeding and may also suggest more endometrial polyps could be present   - will will evaluate uterus for polyps, complete endometrial sampling, polypectomy and remove endocervical polyps with hysteroscopy   - reviewed surgical procedure, R/B and will forward to scheduling for possibly later this week or next available    3. Urinary urgency with incontinence   - pt reports h/o bladder sling and thinks it \"isn't working anymore\" because \"when she has to go, she has to go\" and sometimes can't hold it   - discussed this is a different kind of incontinence and is not treated surgically and unlikely due to sling problem.   - referral to Dr. Bailey for evaluation    Nohemy Cody D.O.    "

## 2023-05-30 NOTE — PROGRESS NOTES
New GYN Problem Note    CC:   Post-menopausal bleeding      HPI:   Patient is a 57 y.o.  who presents complaining of new onset PMB since March.  Bleeding has been frequent and ranging from red, period like flow to pink and brown spotting.      Bleeding episodes:    3/23: period-like flow for 15 days    : period like flow for 10 days    5/3: spotting x 4 days    : spotting x 3 days    : spotting x 4 days    She had a US done  and PCP referred her here.    She reports menopause in  and she is on HRT since 2019 in the form of weekly estradiol patch and nightly 200mg prometrium.  She never had bleeding before on HRT until this started.      GYNECOLOGIC HISTORY:   Current Sexual Activity: no    Menstrual History  Patient's last menstrual period was No LMP recorded. Patient is postmenopausal.       Pap History  Last Pap: 2022 - pap/cotest neg  Hx Moderate or Severe Dysplasia : No     Sexually active:    Social History     Substance and Sexual Activity   Sexual Activity Not Currently    Partners: Male    Birth control/protection: Post-Menopausal       OBSTETRIC HISTORY:  OB History    Para Term  AB Living   3 2 2   1 2   SAB IAB Ectopic Molar Multiple Live Births   1         2      # Outcome Date GA Lbr David/2nd Weight Sex Delivery Anes PTL Lv   3 Term 93 40w0d   M Vag-Spont None N FRANK   2 Term 87 40w0d   M Vag-Spont None N FRANK   1 SAB  6w0d              MEDICAL HISTORY:  Past Medical History:   Diagnosis Date    Diverticulitis     Female stress incontinence 2015    Hypereosinophilic syndrome 2017    Migraine 2012    Plantar fasciitis, bilateral 2020    Last Assessment & Plan:  Formatting of this note might be different from the original. - Discussed conservative measures of foot  exercise, rolling chilled bottles under arches of feet, NSAID use PRN.  - Can consider night splints, therapeutic injections with persistent sxs - Return to clinic  "PRN for worsening or persistent sxs.       SURGICAL HISTORY:  Past Surgical History:   Procedure Laterality Date    PB KNEE SCOPE,MED/LAT MENISECTOMY Left 4/6/2023    Procedure: LEFT KNEE ARTHROSCOPY, LEFT PARTIAL MEDIAL MENISCECTOMY, REPAIRS AS INDICATED;  Surgeon: Logan Rice M.D.;  Location: [facility];  Service: Orthopedics    GYN SURGERY      Bladder sling    OTHER      Sinus surg    OTHER ORTHOPEDIC SURGERY      Rt rotator cuff repair    SINUSOTOMIES         FAMILY HISTORY:  Family History   Problem Relation Age of Onset    Heart Disease Mother         58    Hypertension Mother     Hyperlipidemia Mother     Breast Cancer Maternal Aunt 30    Stomach Cancer Maternal Aunt     Cancer Paternal Grandmother     Breast Cancer Paternal Grandmother        ALLERGIES / REACTIONS:  No Known Allergies    SOCIAL HISTORY:   reports that she has quit smoking. Her smoking use included cigarettes. She has a 0.25 pack-year smoking history. She has never used smokeless tobacco. She reports current alcohol use of about 1.8 oz of alcohol per week. She reports that she does not use drugs.    ROS:   Positive ROS: none  Gen: no fevers or chills, no significant weight loss or gain, excessive fatigue  Respiratory:  no cough or dyspnea  Cardiac:  no chest pain, no palpitations, no syncope  Breast: no breast discharge, pain, lump or skin changes  GI:  no heartburn, no abdominal pain, no nausea or vomiting  Urinary: no dysuria, urgency, frequency, incontinence   Psych: no depression or anxiety  Neuro: no migraines with aura, fainting spells, numbness or tingling  Extremities: no joint pain, persistently swollen ankles, recurrent leg cramps       PHYSICAL EXAMINATION:  Vital Signs:   Vitals:    05/30/23 1306   BP: 117/87   BP Location: Right arm   Patient Position: Sitting   BP Cuff Size: Adult   Weight: 192 lb   Height: 5' 4\"     Body mass index is 32.96 kg/m².  Constitutional: The patient is well developed and " well nourished.  Psychiatric: Patient is oriented to time place and person.   Skin: No rash observed.  Neck: Neck appears symmetric.  Respiratory: normal effort  Abdomen: Soft, non-tender.  Pelvic Exam:     External female genitalia: normal appearance    Urethra - no lesions, no erythema    Vagina: moist, pink, normal ruggae    Cervix: pink, smooth, 2 small endocervical polyps seen just within os canal  Extremeties: Legs are symmetric and without tenderness. There is no edema present.    IMAGING:  FINDINGS:  Both transabdominal and transvaginal scanning were performed to optimally visualize the pelvis.     UTERUS:  The uterus measures 3.70 cm x 6.88 cm x 4.48 cm.  The uterine myometrium is within normal limits.  The endometrial echo complex measures 0.41 cm.  The endometrium is unremarkable in appearance and thickness for age and menstrual status. There is a cervical nabothian cyst.        OVARIES:  The right ovary measures 1.89 cm x 1.91 cm x 1.81 cm. Duplex Doppler examination of the right ovary shows normal waveforms.     The left ovary measures 3.59 cm x 1.14 cm x 2.62 cm. Duplex Doppler examination of the left ovary shows normal waveforms.           There is a small amount of fluid anterior to the uterus which is nonspecific     IMPRESSION:     1.  No acute findings.  2.  Small amount of free pelvic fluid, a nonspecific finding.    ASSESSMENT AND PLAN:  57 y.o.       1. Post-menopausal bleeding   -  given thin endometrial stripe, we discussed the very low/near absent concern that the bleeding represents malignancy.   - discussed endo <4mm usually precludes need for biopsy altogether.   - however, given the frequency and extent of her bleeding, we discussed that reassurance won't necessarily solve the problem.  We discussed that routine TVUS doesn't always reliably exclude small polyps which can cause abnormal bleeding   - discussed SIS vs hysteroscopy for further dx vs dx+tx   - pt would prefer to  "proceed with hysteroscopy    2. Abnormal uterine bleeding due to endocervical polyp   - after exam, endocervical polyps noted which may be cause or contributing to abnormal bleeding and may also suggest more endometrial polyps could be present   - will will evaluate uterus for polyps, complete endometrial sampling, polypectomy and remove endocervical polyps with hysteroscopy   - reviewed surgical procedure, R/B and will forward to scheduling for possibly later this week or next available    3. Urinary urgency with incontinence   - pt reports h/o bladder sling and thinks it \"isn't working anymore\" because \"when she has to go, she has to go\" and sometimes can't hold it   - discussed this is a different kind of incontinence and is not treated surgically and unlikely due to sling problem.   - referral to Dr. Bailey for evaluation    Nohemy Cody D.O.    "

## 2023-05-31 NOTE — PROGRESS NOTES
Urogynecology and Pelvic Reconstructive Surgery Consultation Visit    Nel Terry MRN:[id number] :1965    Referred by: Nohemy Cody DO    Reason for Visit:   Chief Complaint   Patient presents with    New Patient     Consult          Subjective     History of Presenting Illness:    Nel Terry is a 57 y.o. year old P2 who was referred by Dr. Cody for the evaluation and management of fecal incontinence.     She has had a sling previously but her current urine leakage is mostly with urgency. SHMUEL improced after sling.     She has urinary frequency and if she doesn't run she will leak.     She was just seen on Tuesday for work-up of postmenopausal bleeding by Dr. Cody, status post pelvic ultrasound.  She is on HRT with patch and Prometrium.  She is a thin endometrial stripe on ultrasound and has had a discussion with Dr. Cody and a plan for hysteroscopy for full evaluation and diagnosis, especially given endocervical polyps    Prior Pelvic surgery:   10/2017: sling for incontinence ([facility]) - reports no skin incisions, likely single incision sling     Prior treatment:   None     Fluid intake:   6 cups water  2 cups coffee   2 cups tea    Pelvic floor symptom review:     Bladder:   Voids per day: 12-15 Voids per night: 2      Urinary incontinence episodes per day: 3-4    Urge leakage:  On Movement to Bathroom and Full Bladder   Stress leakage: None   Continuous / insensible urine loss: No    Nocturnal enuresis: No    Leakage volume: Drops to moderate   Number of pads/day: 1    Bladder emptying: Complete   Voiding symptoms: Hesitancy, Weak Stream, and Double or Triple Voiding   UTI in last 12 months: no   Other urologic history: no      Prolapse:     Prolapse symptoms: None     Bowel:    No bowel isseus, h/o diverticulitis      Pelvic Pain: no      Past medical and surgical history      Past medical history:  Past Medical History:   Diagnosis Date    Plantar fasciitis,  bilateral 02/21/2020    Last Assessment & Plan:  Formatting of this note might be different from the original. - Discussed conservative measures of foot  exercise, rolling chilled bottles under arches of feet, NSAID use PRN.  - Can consider night splints, therapeutic injections with persistent sxs - Return to clinic PRN for worsening or persistent sxs.    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    Female stress incontinence 06/29/2015    Migraine 03/07/2012    Diverticulitis      Past surgical history:  Past Surgical History:   Procedure Laterality Date    PB KNEE SCOPE,MED/LAT MENISECTOMY Left 4/6/2023    Procedure: LEFT KNEE ARTHROSCOPY, LEFT PARTIAL MEDIAL MENISCECTOMY, REPAIRS AS INDICATED;  Surgeon: Logan Rice M.D.;  Location: [facility];  Service: Orthopedics    GYN SURGERY      Bladder sling    OTHER      Sinus surg    OTHER ORTHOPEDIC SURGERY      Rt rotator cuff repair    SINUSOTOMIES       Medications:has a current medication list which includes the following prescription(s): estradiol, xiidra, celecoxib, gavilyte-g, fluticasone, diphenhydramine hcl, estradiol, progesterone, celecoxib, and metoprolol sr.  Allergies:Patient has no known allergies.  Family history:  Family History   Problem Relation Age of Onset    Heart Disease Mother         58    Hypertension Mother     Hyperlipidemia Mother     Breast Cancer Maternal Aunt 30    Stomach Cancer Maternal Aunt     Cancer Paternal Grandmother     Breast Cancer Paternal Grandmother      Social history: reports that she has quit smoking. Her smoking use included cigarettes. She has a 0.25 pack-year smoking history. She has never used smokeless tobacco. She reports current alcohol use of about 1.8 oz of alcohol per week. She reports that she does not use drugs.    Review of systems: A full review of systems was performed, and negative with the exception of want is noted above in the HPI.        Objective        BP (P) 128/81 (BP  "Location: Right arm, Patient Position: Sitting, BP Cuff Size: Adult)   Pulse (P) 64   Ht (P) 5' 4\"   Wt (P) 191 lb   BMI (P) 32.79 kg/m²     Physical Exam  Vitals reviewed. Exam conducted with a chaperone present.   Constitutional:       Appearance: Normal appearance.   HENT:      Head: Normocephalic.      Mouth/Throat:      Mouth: Mucous membranes are moist.   Cardiovascular:      Rate and Rhythm: Normal rate.   Pulmonary:      Effort: Pulmonary effort is normal.   Skin:     General: Skin is warm and dry.   Neurological:      Mental Status: She is alert.   Psychiatric:         Mood and Affect: Mood normal.         Genitourinary: Deferred to UDS       Procedure Performed: No    Diagnostic test and records review:     Post-void residual: 33 mL, performed by Bladder Scanner    Labs:     Radiology:     Pelvic ultrasound 5/5/2023:  FINDINGS:  Both transabdominal and transvaginal scanning were performed to optimally visualize the pelvis.  UTERUS  The uterus measures 3.70 cm x 6.88 cm x 4.48 cm.  The uterine myometrium is within normal limits.  The endometrial echo complex measures 0.41 cm.  The endometrium is unremarkable in appearance and thickness for age and menstrual status. There is a cervical nabothian cyst.   OVARIES:  The right ovary measures 1.89 cm x 1.91 cm x 1.81 cm. Duplex Doppler examination of the right ovary shows normal waveforms.  The left ovary measures 3.59 cm x 1.14 cm x 2.62 cm. Duplex Doppler examination of the left ovary shows normal waveformsThere is a small amount of fluid anterior to the uterus which is nonspecific  IMPRESSION:  1.  No acute findings.  2.  Small amount of free pelvic fluid, a nonspecific finding.    Documentation reviewed: Prior EMR Records           Assessment & Plan     Nel Terry is a 57 y.o. year old P2 with OAB/UUI after prior sling procedure for SHMUEL. We discussed my recommendations for further diagnosis and treatment at length today.     1. OAB (overactive " bladder)  2. Urge urinary incontinence  3. History of suburethral sling procedure  This patient has overactive bladder; She was educated on the pathophysiology of bladder urgency, and that her symptoms are likely due to overactivity of the bladder muscle and nerves. Conservative/behavioral management strategies were reviewed, including fluid management, avoidance of bladder irritants, urge strategies and Kegel's exercises. Moderate weight loss in obese patients (5-8%) can lead to significant urinary symptom improvement. Overview of pelvic floor physical therapy, biofeedback, and second-line oral medical therapy (anticholinergics, beta-3 agonists) and third-line therapies (intravesical botox injection, PTNS, sacral neuromodulation) discussed.   - Referral given for pelvic floor PT.   - She was given samples of Gemtesa/vibegron of 75 mg once daily.  If she notes symptomatic improvement in 2 to 3 weeks time, she can call for final prescription of either Gemtesa or other approved beta 3 agonist per her insurance preferences.  - I recommend vaginal estrogen, as noted below.  This can help with urinary urgency.  -Due to her history of a prior sling procedure and desire to understand her full symptomatology in order to guide further management, she like to move forward with work-up including urodynamics as well as cystourethroscopy the latter of which will rule out mesh involvement within the bladder or urethra due to her prior sling procedure.  - PROCEDURE CYSTOSCOPY; Future  - PROCEDURE URODYNAMICS; Future      4. Atrophic vaginitis  Her exam confirms vaginal atrophy / genitorurinary syndrome of menopause. This is very common and due to low estrogen levels, which render the vaginal tissue thin, irritated, and open to colonization with gut ciro. This can lead to irritation, dryness, painful sex, urinary infections and urinary urgency. Discussed risks, benefits, and indications for vaginal estrogen therapy.  Vaginal  estrogen has negligible absorption into the bloodstream and is not associated with increased risks for uterine or breast cancers. P  - estradiol (ESTRACE VAGINAL) 0.1 MG/GM vaginal cream; Apply 1g cream inside vagina twice per week  Dispense: 1 Each; Refill: 3             Edward Bailey MD, FACOG  Urogynecology and Pelvic Reconstructive Surgery  Department of Obstetrics and Gynecology  [facility]        This medical record contains text that has been entered with the assistance of computer voice recognition and dictation software.  Therefore, it may contain unintended errors in text, spelling, punctuation, or grammar

## 2023-06-01 ENCOUNTER — GYNECOLOGY VISIT (OUTPATIENT)
Dept: OBGYN | Facility: CLINIC | Age: 58
End: 2023-06-01
Payer: COMMERCIAL

## 2023-06-01 ENCOUNTER — APPOINTMENT (OUTPATIENT)
Dept: ADMISSIONS | Facility: MEDICAL CENTER | Age: 58
End: 2023-06-01
Attending: OBSTETRICS & GYNECOLOGY
Payer: COMMERCIAL

## 2023-06-01 DIAGNOSIS — Z98.890 HISTORY OF SUBURETHRAL SLING PROCEDURE: ICD-10-CM

## 2023-06-01 DIAGNOSIS — N32.81 OAB (OVERACTIVE BLADDER): ICD-10-CM

## 2023-06-01 DIAGNOSIS — N95.2 ATROPHIC VAGINITIS: ICD-10-CM

## 2023-06-01 DIAGNOSIS — N39.41 URGE URINARY INCONTINENCE: ICD-10-CM

## 2023-06-01 PROCEDURE — 99204 OFFICE O/P NEW MOD 45 MIN: CPT | Performed by: STUDENT IN AN ORGANIZED HEALTH CARE EDUCATION/TRAINING PROGRAM

## 2023-06-01 RX ORDER — ESTRADIOL 0.1 MG/G
CREAM VAGINAL
Qty: 1 EACH | Refills: 3 | Status: SHIPPED | OUTPATIENT
Start: 2023-06-01 | End: 2024-03-19 | Stop reason: SDUPTHER

## 2023-06-01 ASSESSMENT — FIBROSIS 4 INDEX: FIB4 SCORE: 0.58

## 2023-06-01 NOTE — PROGRESS NOTES
PT here today for consult   Ref for urge incontinence   Hysterectomy? X  Good #: [phone] (home)   PVR : 33 mL  Unable to leave urine sample letty   Pharmacy Verified

## 2023-06-01 NOTE — Clinical Note
Thanks for the referral, she got in quickly!  We are moving forward with conservative/medical management, no need for any surgical intervention.  You can move forward with scheduling your hysteroscopy without worrying about coordinating any other procedures.

## 2023-06-02 ENCOUNTER — ANESTHESIA (OUTPATIENT)
Dept: SURGERY | Facility: MEDICAL CENTER | Age: 58
End: 2023-06-02
Payer: COMMERCIAL

## 2023-06-02 ENCOUNTER — HOSPITAL ENCOUNTER (OUTPATIENT)
Facility: MEDICAL CENTER | Age: 58
End: 2023-06-02
Attending: OBSTETRICS & GYNECOLOGY | Admitting: OBSTETRICS & GYNECOLOGY
Payer: COMMERCIAL

## 2023-06-02 ENCOUNTER — ANESTHESIA EVENT (OUTPATIENT)
Dept: SURGERY | Facility: MEDICAL CENTER | Age: 58
End: 2023-06-02
Payer: COMMERCIAL

## 2023-06-02 VITALS
BODY MASS INDEX: 32.11 KG/M2 | SYSTOLIC BLOOD PRESSURE: 119 MMHG | OXYGEN SATURATION: 97 % | TEMPERATURE: 97.3 F | DIASTOLIC BLOOD PRESSURE: 69 MMHG | WEIGHT: 188.05 LBS | RESPIRATION RATE: 16 BRPM | HEIGHT: 64 IN | HEART RATE: 60 BPM

## 2023-06-02 PROBLEM — N95.0 POST-MENOPAUSAL BLEEDING: Status: ACTIVE | Noted: 2023-06-02

## 2023-06-02 PROBLEM — N84.1 ENDOCERVICAL POLYP: Status: ACTIVE | Noted: 2023-06-02

## 2023-06-02 LAB
EKG IMPRESSION: NORMAL
PATHOLOGY CONSULT NOTE: NORMAL

## 2023-06-02 PROCEDURE — 160036 HCHG PACU - EA ADDL 30 MINS PHASE I: Performed by: OBSTETRICS & GYNECOLOGY

## 2023-06-02 PROCEDURE — 88305 TISSUE EXAM BY PATHOLOGIST: CPT

## 2023-06-02 PROCEDURE — 58558 HYSTEROSCOPY BIOPSY: CPT | Performed by: OBSTETRICS & GYNECOLOGY

## 2023-06-02 PROCEDURE — 160048 HCHG OR STATISTICAL LEVEL 1-5: Performed by: OBSTETRICS & GYNECOLOGY

## 2023-06-02 PROCEDURE — 00952 ANES VAG PX HYSTSC&/HSG: CPT | Performed by: ANESTHESIOLOGY

## 2023-06-02 PROCEDURE — 160029 HCHG SURGERY MINUTES - 1ST 30 MINS LEVEL 4: Performed by: OBSTETRICS & GYNECOLOGY

## 2023-06-02 PROCEDURE — 160046 HCHG PACU - 1ST 60 MINS PHASE II: Performed by: OBSTETRICS & GYNECOLOGY

## 2023-06-02 PROCEDURE — 700111 HCHG RX REV CODE 636 W/ 250 OVERRIDE (IP): Performed by: ANESTHESIOLOGY

## 2023-06-02 PROCEDURE — 160009 HCHG ANES TIME/MIN: Performed by: OBSTETRICS & GYNECOLOGY

## 2023-06-02 PROCEDURE — 93005 ELECTROCARDIOGRAM TRACING: CPT | Performed by: OBSTETRICS & GYNECOLOGY

## 2023-06-02 PROCEDURE — A9270 NON-COVERED ITEM OR SERVICE: HCPCS | Performed by: ANESTHESIOLOGY

## 2023-06-02 PROCEDURE — 93010 ELECTROCARDIOGRAM REPORT: CPT | Performed by: INTERNAL MEDICINE

## 2023-06-02 PROCEDURE — 700105 HCHG RX REV CODE 258: Performed by: OBSTETRICS & GYNECOLOGY

## 2023-06-02 PROCEDURE — 160025 RECOVERY II MINUTES (STATS): Performed by: OBSTETRICS & GYNECOLOGY

## 2023-06-02 PROCEDURE — 700102 HCHG RX REV CODE 250 W/ 637 OVERRIDE(OP): Performed by: ANESTHESIOLOGY

## 2023-06-02 PROCEDURE — 160002 HCHG RECOVERY MINUTES (STAT): Performed by: OBSTETRICS & GYNECOLOGY

## 2023-06-02 PROCEDURE — 160035 HCHG PACU - 1ST 60 MINS PHASE I: Performed by: OBSTETRICS & GYNECOLOGY

## 2023-06-02 PROCEDURE — 700101 HCHG RX REV CODE 250: Performed by: ANESTHESIOLOGY

## 2023-06-02 RX ORDER — OXYCODONE HCL 5 MG/5 ML
10 SOLUTION, ORAL ORAL
Status: COMPLETED | OUTPATIENT
Start: 2023-06-02 | End: 2023-06-02

## 2023-06-02 RX ORDER — ACETAMINOPHEN 500 MG
1000 TABLET ORAL ONCE
Status: COMPLETED | OUTPATIENT
Start: 2023-06-02 | End: 2023-06-02

## 2023-06-02 RX ORDER — MEPERIDINE HYDROCHLORIDE 25 MG/ML
12.5 INJECTION INTRAMUSCULAR; INTRAVENOUS; SUBCUTANEOUS
Status: DISCONTINUED | OUTPATIENT
Start: 2023-06-02 | End: 2023-06-02 | Stop reason: HOSPADM

## 2023-06-02 RX ORDER — SODIUM CHLORIDE, SODIUM LACTATE, POTASSIUM CHLORIDE, CALCIUM CHLORIDE 600; 310; 30; 20 MG/100ML; MG/100ML; MG/100ML; MG/100ML
INJECTION, SOLUTION INTRAVENOUS CONTINUOUS
Status: DISCONTINUED | OUTPATIENT
Start: 2023-06-02 | End: 2023-06-02 | Stop reason: HOSPADM

## 2023-06-02 RX ORDER — ONDANSETRON 2 MG/ML
INJECTION INTRAMUSCULAR; INTRAVENOUS PRN
Status: DISCONTINUED | OUTPATIENT
Start: 2023-06-02 | End: 2023-06-02 | Stop reason: SURG

## 2023-06-02 RX ORDER — ONDANSETRON 2 MG/ML
4 INJECTION INTRAMUSCULAR; INTRAVENOUS
Status: DISCONTINUED | OUTPATIENT
Start: 2023-06-02 | End: 2023-06-02 | Stop reason: HOSPADM

## 2023-06-02 RX ORDER — HALOPERIDOL 5 MG/ML
1 INJECTION INTRAMUSCULAR
Status: DISCONTINUED | OUTPATIENT
Start: 2023-06-02 | End: 2023-06-02 | Stop reason: HOSPADM

## 2023-06-02 RX ORDER — OXYCODONE HCL 5 MG/5 ML
5 SOLUTION, ORAL ORAL
Status: COMPLETED | OUTPATIENT
Start: 2023-06-02 | End: 2023-06-02

## 2023-06-02 RX ORDER — HYDROMORPHONE HYDROCHLORIDE 1 MG/ML
0.2 INJECTION, SOLUTION INTRAMUSCULAR; INTRAVENOUS; SUBCUTANEOUS
Status: DISCONTINUED | OUTPATIENT
Start: 2023-06-02 | End: 2023-06-02 | Stop reason: HOSPADM

## 2023-06-02 RX ORDER — HYDRALAZINE HYDROCHLORIDE 20 MG/ML
5 INJECTION INTRAMUSCULAR; INTRAVENOUS
Status: DISCONTINUED | OUTPATIENT
Start: 2023-06-02 | End: 2023-06-02 | Stop reason: HOSPADM

## 2023-06-02 RX ORDER — LIDOCAINE HYDROCHLORIDE 20 MG/ML
INJECTION, SOLUTION EPIDURAL; INFILTRATION; INTRACAUDAL; PERINEURAL PRN
Status: DISCONTINUED | OUTPATIENT
Start: 2023-06-02 | End: 2023-06-02 | Stop reason: SURG

## 2023-06-02 RX ORDER — HYDROMORPHONE HYDROCHLORIDE 1 MG/ML
0.4 INJECTION, SOLUTION INTRAMUSCULAR; INTRAVENOUS; SUBCUTANEOUS
Status: DISCONTINUED | OUTPATIENT
Start: 2023-06-02 | End: 2023-06-02 | Stop reason: HOSPADM

## 2023-06-02 RX ORDER — MIDAZOLAM HYDROCHLORIDE 1 MG/ML
1 INJECTION INTRAMUSCULAR; INTRAVENOUS
Status: DISCONTINUED | OUTPATIENT
Start: 2023-06-02 | End: 2023-06-02 | Stop reason: HOSPADM

## 2023-06-02 RX ORDER — KETOROLAC TROMETHAMINE 30 MG/ML
INJECTION, SOLUTION INTRAMUSCULAR; INTRAVENOUS PRN
Status: DISCONTINUED | OUTPATIENT
Start: 2023-06-02 | End: 2023-06-02 | Stop reason: SURG

## 2023-06-02 RX ORDER — DEXAMETHASONE SODIUM PHOSPHATE 4 MG/ML
INJECTION, SOLUTION INTRA-ARTICULAR; INTRALESIONAL; INTRAMUSCULAR; INTRAVENOUS; SOFT TISSUE PRN
Status: DISCONTINUED | OUTPATIENT
Start: 2023-06-02 | End: 2023-06-02 | Stop reason: SURG

## 2023-06-02 RX ORDER — EPHEDRINE SULFATE 50 MG/ML
5 INJECTION, SOLUTION INTRAVENOUS
Status: DISCONTINUED | OUTPATIENT
Start: 2023-06-02 | End: 2023-06-02 | Stop reason: HOSPADM

## 2023-06-02 RX ORDER — DIPHENHYDRAMINE HYDROCHLORIDE 50 MG/ML
12.5 INJECTION INTRAMUSCULAR; INTRAVENOUS
Status: DISCONTINUED | OUTPATIENT
Start: 2023-06-02 | End: 2023-06-02 | Stop reason: HOSPADM

## 2023-06-02 RX ORDER — EPHEDRINE SULFATE 50 MG/ML
INJECTION, SOLUTION INTRAVENOUS PRN
Status: DISCONTINUED | OUTPATIENT
Start: 2023-06-02 | End: 2023-06-02 | Stop reason: SURG

## 2023-06-02 RX ORDER — HYDROMORPHONE HYDROCHLORIDE 1 MG/ML
0.1 INJECTION, SOLUTION INTRAMUSCULAR; INTRAVENOUS; SUBCUTANEOUS
Status: DISCONTINUED | OUTPATIENT
Start: 2023-06-02 | End: 2023-06-02 | Stop reason: HOSPADM

## 2023-06-02 RX ORDER — MIDAZOLAM HYDROCHLORIDE 1 MG/ML
INJECTION INTRAMUSCULAR; INTRAVENOUS PRN
Status: DISCONTINUED | OUTPATIENT
Start: 2023-06-02 | End: 2023-06-02 | Stop reason: SURG

## 2023-06-02 RX ADMIN — OXYCODONE HYDROCHLORIDE 5 MG: 5 SOLUTION ORAL at 11:35

## 2023-06-02 RX ADMIN — ACETAMINOPHEN 1000 MG: 500 TABLET, FILM COATED ORAL at 10:10

## 2023-06-02 RX ADMIN — PROPOFOL 50 MG: 10 INJECTION, EMULSION INTRAVENOUS at 10:54

## 2023-06-02 RX ADMIN — DEXAMETHASONE SODIUM PHOSPHATE 4 MG: 4 INJECTION INTRA-ARTICULAR; INTRALESIONAL; INTRAMUSCULAR; INTRAVENOUS; SOFT TISSUE at 10:48

## 2023-06-02 RX ADMIN — MIDAZOLAM 2 MG: 1 INJECTION, SOLUTION INTRAMUSCULAR; INTRAVENOUS at 10:43

## 2023-06-02 RX ADMIN — PROPOFOL 150 MG: 10 INJECTION, EMULSION INTRAVENOUS at 10:48

## 2023-06-02 RX ADMIN — FENTANYL CITRATE 25 MCG: 50 INJECTION, SOLUTION INTRAMUSCULAR; INTRAVENOUS at 12:05

## 2023-06-02 RX ADMIN — SODIUM CHLORIDE, POTASSIUM CHLORIDE, SODIUM LACTATE AND CALCIUM CHLORIDE: 600; 310; 30; 20 INJECTION, SOLUTION INTRAVENOUS at 10:16

## 2023-06-02 RX ADMIN — FENTANYL CITRATE 50 MCG: 50 INJECTION, SOLUTION INTRAMUSCULAR; INTRAVENOUS at 10:52

## 2023-06-02 RX ADMIN — ONDANSETRON 4 MG: 2 INJECTION INTRAMUSCULAR; INTRAVENOUS at 11:06

## 2023-06-02 RX ADMIN — EPHEDRINE SULFATE 5 MG: 50 INJECTION, SOLUTION INTRAVENOUS at 10:59

## 2023-06-02 RX ADMIN — LIDOCAINE HYDROCHLORIDE 100 MG: 20 INJECTION, SOLUTION EPIDURAL; INFILTRATION; INTRACAUDAL at 10:48

## 2023-06-02 RX ADMIN — FENTANYL CITRATE 50 MCG: 50 INJECTION, SOLUTION INTRAMUSCULAR; INTRAVENOUS at 10:54

## 2023-06-02 RX ADMIN — FENTANYL CITRATE 25 MCG: 50 INJECTION, SOLUTION INTRAMUSCULAR; INTRAVENOUS at 12:13

## 2023-06-02 RX ADMIN — KETOROLAC TROMETHAMINE 30 MG: 30 INJECTION, SOLUTION INTRAMUSCULAR; INTRAVENOUS at 11:06

## 2023-06-02 ASSESSMENT — PAIN DESCRIPTION - PAIN TYPE
TYPE: SURGICAL PAIN

## 2023-06-02 ASSESSMENT — FIBROSIS 4 INDEX: FIB4 SCORE: 0.58

## 2023-06-02 NOTE — ANESTHESIA PROCEDURE NOTES
Airway    Date/Time: 6/2/2023 10:50 AM    Performed by: Pb Pérez M.D.  Authorized by: Pb Pérez M.D.    Location:  OR  Urgency:  Elective  Indications for Airway Management:  Anesthesia      Spontaneous Ventilation: absent    Sedation Level:  Deep  Preoxygenated: Yes    Mask Difficulty Assessment:  1 - vent by mask  Final Airway Type:  Supraglottic airway  Final Supraglottic Airway:  Standard LMA    SGA Size:  4  Number of Attempts at Approach:  1

## 2023-06-02 NOTE — OR NURSING
1114: Pt arrives to PACU asleep and calm. VSS.     1138: Pts son called and updated.    1230: Pt states pain is now tolerable and denies nausea. No vaginal bleeding noted. Report called to wan DANGELO.

## 2023-06-02 NOTE — OR NURSING
Arrived from PACU  Pt is A&Ox4, VSS; denies N/V; states pain is at tolerable level.     Pt able to void without any difficulty   D/c orders received. IV dc'd.   Pt changed into clothing with assistance.   Discharge reviewed  Pt and family verbalized understanding and questions answered.   Patient states ready to d/c home.   Pt dc'd in w/c with Logan - son .

## 2023-06-02 NOTE — DISCHARGE INSTRUCTIONS
HOME CARE INSTRUCTIONS    ACTIVITY: Rest and take it easy for the first 24 hours.  A responsible adult is recommended to remain with you during that time.  It is normal to feel sleepy.  We encourage you to not do anything that requires balance, judgment or coordination.    FOR 24 HOURS DO NOT:  Drive, operate machinery or run household appliances.  Drink beer or alcoholic beverages.  Make important decisions or sign legal documents.    SPECIAL INSTRUCTIONS: Follow MD instruction given to you prior to procedure. Nothing in vaginal for 6 weeks-pelvic rest.    DIET: To avoid nausea, slowly advance diet as tolerated, avoiding spicy or greasy foods for the first day.  Add more substantial food to your diet according to your physician's instructions.  Babies can be fed formula or breast milk as soon as they are hungry.  INCREASE FLUIDS AND FIBER TO AVOID CONSTIPATION.    SURGICAL DRESSING/BATHING: may shower. No hot tub, tub baths or pools until cleared by MD    MEDICATIONS: Resume taking daily medication.  Take prescribed pain medication with food.  If no medication is prescribed, you may take non-aspirin pain medication if needed.  PAIN MEDICATION CAN BE VERY CONSTIPATING.  Take a stool softener or laxative such as senokot, pericolace, or milk of magnesia if needed.    Last pain medication given at 11:35 am.    A follow-up appointment should be arranged with your doctor in 1-2 weeks; call to schedule.    You should CALL YOUR PHYSICIAN if you develop:  Fever greater than 101 degrees F.  Pain not relieved by medication, or persistent nausea or vomiting.  Excessive bleeding (blood soaking through dressing) or unexpected drainage from the wound.  Extreme redness or swelling around the incision site, drainage of pus or foul smelling drainage.  Inability to urinate or empty your bladder within 8 hours.  Problems with breathing or chest pain.    You should call 911 if you develop problems with breathing or chest pain.  If you  are unable to contact your doctor or surgical center, you should go to the nearest emergency room or urgent care center.  Physician's telephone #: [phone] Dr Cody    MILD FLU-LIKE SYMPTOMS ARE NORMAL.  YOU MAY EXPERIENCE GENERALIZED MUSCLE ACHES, THROAT IRRITATION, HEADACHE AND/OR SOME NAUSEA.    If any questions arise, call your doctor.  If your doctor is not available, please feel free to call the Surgical Center at [phone].  The Center is open Monday through Friday from 7AM to 7PM.      A registered nurse may call you a few days after your surgery to see how you are doing after your procedure.    You may also receive a survey in the mail within the next two weeks and we ask that you take a few moments to complete the survey and return it to us.  Our goal is to provide you with very good care and we value your comments.     Depression / Suicide Risk    As you are discharged from this [facility] facility, it is important to learn how to keep safe from harming yourself.    Recognize the warning signs:  Abrupt changes in personality, positive or negative- including increase in energy   Giving away possessions  Change in eating patterns- significant weight changes-  positive or negative  Change in sleeping patterns- unable to sleep or sleeping all the time   Unwillingness or inability to communicate  Depression  Unusual sadness, discouragement and loneliness  Talk of wanting to die  Neglect of personal appearance   Rebelliousness- reckless behavior  Withdrawal from people/activities they love  Confusion- inability to concentrate     If you or a loved one observes any of these behaviors or has concerns about self-harm, here's what you can do:  Talk about it- your feelings and reasons for harming yourself  Remove any means that you might use to hurt yourself (examples: pills, rope, extension cords, firearm)  Get professional help from the community (Mental Health, Substance Abuse, psychological  counseling)  Do not be alone:Call your Safe Contact- someone whom you trust who will be there for you.  Call your local CRISIS HOTLINE 242-1203 or 110-485-5547  Call your local Children's Mobile Crisis Response Team [place] (116) 083-8032 or www.PromoteSocial  Call the toll free National Suicide Prevention Hotlines   National Suicide Prevention Lifeline 193-479-IPDN (9787)  [facility] 800-SUICIDE (151-0297)    I acknowledge receipt and understanding of these Home Care instructions.

## 2023-06-02 NOTE — ANESTHESIA TIME REPORT
Anesthesia Start and Stop Event Times     Date Time Event    6/2/2023 1018 Ready for Procedure     1043 Anesthesia Start     1116 Anesthesia Stop        Responsible Staff  06/02/23    Name Role Begin End    Pb Pérez M.D. Anesth 1043 1116        Overtime Reason:  no overtime (within assigned shift)    Comments:

## 2023-06-02 NOTE — ANESTHESIA PREPROCEDURE EVALUATION
Case: [id number] Date/Time: 06/02/23 1045    Procedure: HYSTEROSCOPY DILATION AND CURETTAGE, POLYPECTOMY    Pre-op diagnosis: POSTMENOPAUSAL BLEEDING    Location: [facility] OR 15 / SURGERY [facility]    Surgeons: Nohemy Cody D.O.          Relevant Problems   CARDIAC   (positive) Inappropriate sinus tachycardia   (positive) Mild mitral regurgitation   (positive) Mitral valve prolapse       Physical Exam    Airway   Mallampati: II  TM distance: >3 FB  Neck ROM: full       Cardiovascular - normal exam  Rhythm: regular  Rate: normal  (-) murmur     Dental - normal exam           Pulmonary - normal exam  Breath sounds clear to auscultation     Abdominal    Neurological - normal exam                 Anesthesia Plan    ASA 2       Plan - general       Airway plan will be LMA          Induction: intravenous    Postoperative Plan: Postoperative administration of opioids is intended.    Pertinent diagnostic labs and testing reviewed    Informed Consent:    Anesthetic plan and risks discussed with patient.    Use of blood products discussed with: patient whom consented to blood products.

## 2023-06-02 NOTE — OP REPORT
Operative Report - Hysteroscopy    Patient: Nel Terry  MRN: [id number]     YOB: 1965   Age: 57 y.o.   Sex:female  Unit: [facility] INTRA-OP T[facility]OE  Room/Bed: T OR POOL/NONE  Location: [facility]          DATE OF OPERATION: 2023     Pre-operative Diagnosis: post-menopausal bleeding; endocervical polyps  Post-operative Diagnosis: same   Procedure: Hysteroscopy/D&C (with EMB and ECC sent together)  Anesthesia: General   Antibiotics: none  Surgeon(s):Escobar  Assistant(s): Christina Perez NP  Findings:   1. normal appearing uterine cavity with thin endometrium and no endometrial polyps noted  Complications: none   Specimens: none   EBL: Minimal   Hysteroscopic Fluid Deficit:  0mL deficit of NS    Indications:   Patient is a 57 y.o.  with postmenopsausal bleeding.  She was noted to have a very thin (4mm) endometrial stripe and some appearance of endocervical polyps on exam.  Decision was made to proceed with hysteroscopy for D&C and removal of endometrial and/or endocervical polyps. Success rate, risks, and benefits of the procedure were discussed with patient at length.  The patient is aware of risk of infection, bleeding, fluid overload, blood clot in the leg or lungs, perforation of the uterus, anesthesia complications, and possible conversion to laparotomy.  All patient's questions were thoroughly answered prior to the procedure and she agreed to the procedure.    Procedure:   The patient was taken to the operating room and placed under general anesthesia. She was then prepped and draped in the normal sterile fashion in the dorsal lithotomy position. Two lucio retractors were placed into the vagina and the anterior lip of the cervix was grasped with a(n) Allis and pulled into view. The uterus was retroverted.  The cervix was gradually dilated to accommodate the scope.  Next, the hysteroscope was placed into the cervical os and advanced slowly into the uterine cavity  under direct visualization using hydrodissection as the means of dilation.     An inspection of the uterine cavity revealed the findings listed above. The hysteroscope was then removed from the uterus. Sharp curettage was performed for endometrial sampling with care taken to perform endocervical curettage to detatch the endocervical polyps.     All instruments were removed from the uterus, cervix and vagina, and hemostasis of the cervix was observed. At the end of the procedure, total fluid deficit was 0mL.     Sponge and instrument counts were correct at the end of the procedure and the patient was taken to recovery in good condition      Nohemy Cody D.O.

## 2023-06-05 ASSESSMENT — PAIN SCALES - GENERAL: PAIN_LEVEL: 3

## 2023-06-05 NOTE — ANESTHESIA POSTPROCEDURE EVALUATION
"      Patient: Nel Terry    Procedure Summary     Date: 06/02/23 Room / Location: [place] [address] / SURGERY [facility]    Anesthesia Start: 1043 Anesthesia Stop: 1116    Procedure: HYSTEROSCOPY DILATION AND CURETTAGE (Vagina ) Diagnosis: (POSTMENOPAUSAL BLEEDING)    Surgeons: Nohemy Cody D.O. Responsible Provider: Pb Pérez M.D.    Anesthesia Type: general ASA Status: 2          Final Anesthesia Type: general  Last vitals  /69   Pulse 60   Temp 36.3 °C (97.3 °F) (Temporal)   Resp 16   Ht 1.626 m (5' 4\")   Wt 85.3 kg (188 lb 0.8 oz)   SpO2 97%   BMI 32.28 kg/m²     Anesthesia Post Evaluation    Patient location during evaluation: PACU  Patient participation: complete - patient participated  Level of consciousness: awake and alert  Pain score: 3    Airway patency: patent  Anesthetic complications: no  Cardiovascular status: hemodynamically stable  Respiratory status: acceptable  Hydration status: euvolemic    PONV: none          No notable events documented.     Nurse Pain Score: 3 (NPRS)        "

## 2023-06-10 DIAGNOSIS — I47.11 INAPPROPRIATE SINUS TACHYCARDIA (HCC): ICD-10-CM

## 2023-06-12 RX ORDER — METOPROLOL SUCCINATE 25 MG/1
25 TABLET, EXTENDED RELEASE ORAL DAILY
Qty: 90 TABLET | Refills: 3 | Status: SHIPPED | OUTPATIENT
Start: 2023-06-12 | End: 2023-12-18 | Stop reason: SDUPTHER

## 2023-06-12 NOTE — TELEPHONE ENCOUNTER
Is the patient due for a refill? Yes    Was the patient seen the past year? Yes    Date of last office visit: 3/16/2023    Does the patient have an upcoming appointment?  No   If yes, When?     Provider to refill:DA    Does the patients insurance require a 100 day supply?  No

## 2023-06-21 ENCOUNTER — TELEPHONE (OUTPATIENT)
Dept: OBGYN | Facility: CLINIC | Age: 58
End: 2023-06-21
Payer: COMMERCIAL

## 2023-06-21 DIAGNOSIS — N39.41 URGE URINARY INCONTINENCE: ICD-10-CM

## 2023-06-23 ENCOUNTER — TELEPHONE (OUTPATIENT)
Dept: OBGYN | Facility: CLINIC | Age: 58
End: 2023-06-23
Payer: COMMERCIAL

## 2023-06-23 NOTE — TELEPHONE ENCOUNTER
Pt called returning Selene's call and I told her when Selene gets back in office she will call her back.

## 2023-06-26 DIAGNOSIS — N39.41 URGE URINARY INCONTINENCE: ICD-10-CM

## 2023-07-31 PROCEDURE — 52000 CYSTOURETHROSCOPY: CPT | Performed by: STUDENT IN AN ORGANIZED HEALTH CARE EDUCATION/TRAINING PROGRAM

## 2023-07-31 NOTE — PROCEDURES
Procedure note: Complex urodynamic testing    Procedure performed:    -     51754 Complex Uroflowmetry  07406 Complex CMG w/ voiding pressure study AND urethral pressure  73303 EMG studies anal or urethral sphincter   72832 Intraabdominal catheter       Indication: Nel Terry is a 57 y.o. year old with OAB, urge urinary incontinence, incomplete bladder emptying and history of suburethral sling procedure.     Bladder:              Voids per day: 12-15   Voids per night: 2                 Urinary incontinence episodes per day: 3-4               Urge leakage:  On Movement to Bathroom and Full Bladder              Stress leakage: None              Continuous / insensible urine loss: No               Nocturnal enuresis: No               Leakage volume: Drops to moderate              Number of pads/day: 1               Bladder emptying: Complete              Voiding symptoms: Hesitancy, Weak Stream, and Double or Triple Voiding      She presents for complex urodynamic testing today to fully elucidate her bladder function and symptom pathophysiology, in order to guide further treatment.    Verbal consent was obtained after review of risk and benefit.     Chaperone: Lesley Vargas MA    Procedure: The patient was taken to the urodynamic suite and placed in the urodynamic chair. She underwent sterile prep with betadine prior to catheterization. There was a negative urinalysis. Air-charged catheters were placed in the urethra/bladder and vagina. Urodynamics were performed using routine techniques. There were no complications.       Urodynamic findings:     Preliminary Uroflometry     Flow pattern: Patient unable to void after cysto.   Maximum flow: n/a  Average flow: n/a  Voided volume: n/a  Post-void residual: 230 mL  Flow time: n/a    Filling cystometrogram    First sensation: 21 mL  First desire: 124 mL  Strong Desire: 244 mL  Urodynamic capacity: 389 mL   Stress leakage: No  Uninhibited detrusor contractions  present: No  Leakage with DO: No  Leak point pressures  At 150mL volume: 144 cm H2O, no leakage noted  At 300mL volume: 149 cm H2O, no leakage noted  Compliance: Normal    Urethral pressure profile    Maximum urethral closing pressure (MUCP): 65 cm H2O  Morphology: Normal    Pressure voiding study    The patient's voiding mechanism was accomplished by detrusor contraction   Max flow: 17 mL/sec  Average flow: 7.9 mL/sec  Post-void residual: 0 mL  Pdet at peak flow: 15 cm H2O  Flow time: 49 sec  Pelvic floor EMG silenced during voiding: Yes    Pelvic floor EMG: Normal     Assessment:     She has completed urodynamic testing, which was uncomplicated.     Filling phase: Normal capacity, normal compliance, no stress incontinence noted, no uninhibited detrusor contractions  Voiding phase: Complete emptying via detrusor contraction on pressure flow despite inability to empty on initial uroflow which may be secondary to cystoscopy and urethral numbing    Plan:   She was counseled on urodynamic findings  She has had mild improvement with Myrbetriq, and is due to start physical therapy soon.  She would like to discuss further treatment options as to avoid taking long-term medications for more complete control of her symptoms.  She qualifies for third line therapies, and she was counseled on her options, which include:   Percutaneous tibial nerve stimulation (PTNS) - noninvasive and minimal risk therapy which involves nerve stimulation using an acupuncture-type needle in the ankle with non-painful nerve stimulation. This involves 12 weekly 30-minute treatment sessions, then maintenance treatments as needed, usually every 1-2 months. Symptom improvement is seen in 60-80% of patients.   Sacral neuromodulation (SNM) - this involves a “test phase” with temporary lead implantation, followed by a full permanent implant of lad and batter if therapy results in >50% improvement in symptoms. Therapy is successful in reducing OAB  "symptoms in approximately 70% of patients.   Bladder chemodenervation with Botox injection - this is an office/outpatient procedure involving a cystoscopy (camera in bladder) and injection of onabotulinumtoxinA. Botox has higher rates of complete symptom resolution compared to SNM (70-80%), but may result in temporary urinary retention requiring intermittent catheterization (6-12%). The effects of botox usually last between 6-12 months, and if successful, repeat injections are necessary.   After extensive discussion she would like to move forward with one of the more durable treatments for her urinary function, and would like to move forward with trial of sacral neuromodulation. Book for sacral neuromodulation basic nerve evaluation, placement of electrode array under fluoroscopic guidance, followed if successful by full implantation Two phase procedure discussed - the “Basic Test - peripheral nerve evaluation (PNE)\" is procedure where temporary wire leads are placed under local anesthetic with moderate sedation. These are placed in the lower back near the nerves that supply the bowel and bladder, which will stay in-place for approximately 1 week while maintaining a detailed symptom diary. Patients cannot shower with temporary leads in place and must sponge-bathe. These leads will be removed at subsequent office visit. If >50% improvement in symptoms is seed during the test phase, then a full implant procedure can be performed with a permanent wire and battery. This takes placed under sedation in the operating room with the assistance of fluoroscopy (live X-ray). The device representative from [employer] will discuss implant option types, including rechargeable and non-rechargeable batteries, and their respective benefits/drawbacks. Implants are now MRI-compatible.Real world success rates are estimated between 50-85% risks of infection, pain at implant site, and failure discussed. All questions were answered.   Bladder " diary provided with instructions, as well as informational leaflet and printed instructions.  Counseled on normal post-UDS symptoms including burning and possible hematuria. If this persists after 2 days she should call or send [employer] message.       Procedure performed by JOSEP Puckett RNFA Kerac Falk, MD, FACOG    Female Pelvic Medicine and Reconstructive Surgery  Department of Obstetrics and Gynecology  [facility]

## 2023-07-31 NOTE — PROCEDURES
Procedure note: Cystourethroscopy    Procedure performed: Cystourethroscopy (55557)      Indication: Nel Terry is a 57 y.o.  with OAB/UUI with h/o sling. She presents for office diagnostic cystourethroscopy testing today to rule out mesh exposure into bladder. Written consent was obtained after review of risk and benefit.       Cystoscopy    Date/Time: 7/31/2023 3:45 PM    Performed by: Edward Bailey M.D.  Authorized by: Edward Bailey M.D.    Chaperone present: yes    Timeout: timeout called immediately prior to procedure    Prep: patient was prepped and draped in usual sterile fashion    Prep type: Betadine    Anesthesia: local anesthesia      Procedure Details     Cystoscope type: flexible    Cystoscopy route: transurethral      Irrigation used: saline      Position: dorsal lithotomy    Urethra     Urethra: normal      Vagina     Vagina: normal      Bladder     Bladder comment: Small <3mm lesion (unclear if varicocity or mass) in left posterior  dome. Cytology sent. Urothelium otherwise normal, no mesh or diverticula.     Post-Procedure Details     Appearance of urine after procedure: clear    Outcome: patient tolerated procedure well with no complications      Post-procedure interventions: post-procedure instructions given      Disposition: discharged home in satisfactory condition            - f/u urine cytology.     Edward Bailey MD

## 2023-08-01 ENCOUNTER — PROCEDURE VISIT (OUTPATIENT)
Dept: OBGYN | Facility: CLINIC | Age: 58
End: 2023-08-01
Payer: COMMERCIAL

## 2023-08-01 ENCOUNTER — HOSPITAL ENCOUNTER (OUTPATIENT)
Facility: MEDICAL CENTER | Age: 58
End: 2023-08-01
Attending: STUDENT IN AN ORGANIZED HEALTH CARE EDUCATION/TRAINING PROGRAM
Payer: COMMERCIAL

## 2023-08-01 VITALS — WEIGHT: 193 LBS | BODY MASS INDEX: 33.13 KG/M2

## 2023-08-01 DIAGNOSIS — N39.41 URGE URINARY INCONTINENCE: ICD-10-CM

## 2023-08-01 DIAGNOSIS — N32.9 LESION OF BLADDER: ICD-10-CM

## 2023-08-01 DIAGNOSIS — N32.81 OAB (OVERACTIVE BLADDER): ICD-10-CM

## 2023-08-01 DIAGNOSIS — Z98.890 HISTORY OF SUBURETHRAL SLING PROCEDURE: Primary | ICD-10-CM

## 2023-08-01 LAB
APPEARANCE UR: NORMAL
BILIRUB UR STRIP-MCNC: NORMAL MG/DL
COLOR UR AUTO: YELLOW
CYTOLOGY REG CYTOL: NORMAL
GLUCOSE UR STRIP.AUTO-MCNC: NEGATIVE MG/DL
KETONES UR STRIP.AUTO-MCNC: NEGATIVE MG/DL
LEUKOCYTE ESTERASE UR QL STRIP.AUTO: NORMAL
NITRITE UR QL STRIP.AUTO: NEGATIVE
PH UR STRIP.AUTO: 6 [PH] (ref 5–8)
PROT UR QL STRIP: NEGATIVE MG/DL
RBC UR QL AUTO: NORMAL
SP GR UR STRIP.AUTO: 1.01
UROBILINOGEN UR STRIP-MCNC: NORMAL MG/DL

## 2023-08-01 PROCEDURE — 51784 ANAL/URINARY MUSCLE STUDY: CPT | Mod: 51 | Performed by: STUDENT IN AN ORGANIZED HEALTH CARE EDUCATION/TRAINING PROGRAM

## 2023-08-01 PROCEDURE — 51729 CYSTOMETROGRAM W/VP&UP: CPT | Performed by: STUDENT IN AN ORGANIZED HEALTH CARE EDUCATION/TRAINING PROGRAM

## 2023-08-01 PROCEDURE — 81002 URINALYSIS NONAUTO W/O SCOPE: CPT | Performed by: NURSE PRACTITIONER

## 2023-08-01 PROCEDURE — 88112 CYTOPATH CELL ENHANCE TECH: CPT

## 2023-08-01 PROCEDURE — 51797 INTRAABDOMINAL PRESSURE TEST: CPT | Performed by: STUDENT IN AN ORGANIZED HEALTH CARE EDUCATION/TRAINING PROGRAM

## 2023-08-01 PROCEDURE — 51741 ELECTRO-UROFLOWMETRY FIRST: CPT | Mod: 51 | Performed by: STUDENT IN AN ORGANIZED HEALTH CARE EDUCATION/TRAINING PROGRAM

## 2023-08-01 ASSESSMENT — FIBROSIS 4 INDEX: FIB4 SCORE: 0.58

## 2023-08-01 NOTE — PATIENT INSTRUCTIONS
"    Pre-op: What you should know before your surgery  Sacral neuromodulation    What you should know before your surgery    Sacral nerve stimulation (SNS), also called sacral neuromodulation, involves surgical implantation of a device that sends a low-voltage electrical current to the sacral nerve through the S3 foramen. The sacral nerve is located at the base of the spine that affects the bladder, bowel, and pelvic floor. The implant stimulates the sacral nerve and to alleviate fecal and/or urinary incontinence. A hand-held device is used to modulate the energy and frequency cycles to adjust the SNS for best performance.     Adult patients experiencing urinary incontinence due to retention without obstruction (overflow incontinence) and/or overactive bladder with urge incontinence, and individuals with fecal incontinence and/or chronic constipation who have failed other treatments may be able to try this treatment. You must be able to operate the hand-held  device. There are medical conditions that may prevent the safe use of this sacral nerve stimulator.     In studies, patient who use SNS had a 76% achieved success rate at 6 months, compared to 49% of people who use medications for urinary incontinence. There are higher success rates for fecal incontinence. Overall patients report greater quality of life scores compared to those taking medications.       How does sacral neuromodulation therapy work?  The nerve stimulator provides gentle stimulation to the nerves to communicate between the brain, bladder, and bowel.  This advanced therapy restores normal communication which can result in significant symptom improvement.    Trial period (\"nerve test\")  In order to be sure that sacral neuromodulation will work to control your symptoms, you will go undergo a test, for approximately 1 to 2 weeks,  where an electrode lead will be placed, and you will track her symptoms to see if there is significant " "improvement.  The  can either be placed in the office under local anesthetic (also known as a \"basic trial\"), or sometimes in the operating room with light sedation (also known as a \"advanced trial\").  Your doctor will recommend a basic trial advance trial depending on your duration of symptoms, as well as the anatomy of your spine and prior surgeries.  This should not be a painful procedure, however there is some discomfort with the initial injection of numbing medication.      During the trial period, the leads will be covered by a large bandage, which cannot get wet, she must either carefully shower from the front only, or use a sponge bath to avoid the bandage from falling off.  You will also be taking a low-dose antibiotic to prevent infection during the trial period.  Will be required to diligently record your bladder and/or bowel symptoms during this time period.  Additionally, the representative from the sacral neuromodulation device company will work with you to make sure you are using the device correctly and to optimize your results of the trial.    If you have significant improvement of your symptoms, then the full implant will be placed in the operating room under sedation.  This involves using an x-ray to guide exactly placement, and connection to a long-lasting battery that sits just underneath the skin at the top of your buttock.  The procedure takes less than 1 hour and is overall very safe    General risks of sacral neuromodulation: Specific risks for your procedure are discussed separately    Anesthesia: With modern anesthetics and monitoring equipment, complications due to anesthesia are very rare. Your anesthesiologist will discuss what will be most suitable for you on the day of surgery  Bleeding: all surgery leads to bleeding, however this surgery usually amounts to very minimal blood loss.  Large amount of blood loss that require a blood transfusion are not common in sacral " neuromodulation surgery.  Blood transfusion, if needed, is safe. Minor reactions like fever or allergy occur in less than 1% of transfusions. Risks of an infection or mismatched blood is very rare (less then 1 out of every 100,000 transfusions)  Infection: This is uncommon, but prevented with antibiotics.   Blood Clots: Clots in the blood vessels of the legs/lungs are potentially dangerous and can occur in patients undergoing this type of surgery. This is prevented with leg compression during surgery, and sometimes an injected blood thinner. Staying mobile after surgery is strongly encouraged in all patients, and is  important in preventing clots.  Damage to surrounding organs is rare, Sometimes pain at the implantation site can occur.

## 2023-09-19 ENCOUNTER — APPOINTMENT (OUTPATIENT)
Dept: ADMISSIONS | Facility: MEDICAL CENTER | Age: 58
End: 2023-09-19
Attending: STUDENT IN AN ORGANIZED HEALTH CARE EDUCATION/TRAINING PROGRAM
Payer: COMMERCIAL

## 2023-10-02 ENCOUNTER — PRE-ADMISSION TESTING (OUTPATIENT)
Dept: ADMISSIONS | Facility: MEDICAL CENTER | Age: 58
End: 2023-10-02
Attending: STUDENT IN AN ORGANIZED HEALTH CARE EDUCATION/TRAINING PROGRAM
Payer: COMMERCIAL

## 2023-10-02 RX ORDER — CYANOCOBALAMIN (VITAMIN B-12) 1000 MCG
1 TABLET, SUBLINGUAL SUBLINGUAL DAILY
COMMUNITY
End: 2024-01-22

## 2023-10-03 ENCOUNTER — APPOINTMENT (OUTPATIENT)
Dept: ADMISSIONS | Facility: MEDICAL CENTER | Age: 58
End: 2023-10-03
Attending: STUDENT IN AN ORGANIZED HEALTH CARE EDUCATION/TRAINING PROGRAM
Payer: COMMERCIAL

## 2023-10-03 DIAGNOSIS — Z01.812 PRE-OPERATIVE LABORATORY EXAMINATION: ICD-10-CM

## 2023-10-03 LAB
EST. AVERAGE GLUCOSE BLD GHB EST-MCNC: 120 MG/DL
HBA1C MFR BLD: 5.8 % (ref 4–5.6)

## 2023-10-03 PROCEDURE — 36415 COLL VENOUS BLD VENIPUNCTURE: CPT

## 2023-10-03 PROCEDURE — 83036 HEMOGLOBIN GLYCOSYLATED A1C: CPT

## 2023-10-09 ENCOUNTER — ANESTHESIA (OUTPATIENT)
Dept: SURGERY | Facility: MEDICAL CENTER | Age: 58
End: 2023-10-09
Payer: COMMERCIAL

## 2023-10-09 ENCOUNTER — HOSPITAL ENCOUNTER (OUTPATIENT)
Facility: MEDICAL CENTER | Age: 58
End: 2023-10-09
Attending: STUDENT IN AN ORGANIZED HEALTH CARE EDUCATION/TRAINING PROGRAM | Admitting: STUDENT IN AN ORGANIZED HEALTH CARE EDUCATION/TRAINING PROGRAM
Payer: COMMERCIAL

## 2023-10-09 ENCOUNTER — APPOINTMENT (OUTPATIENT)
Dept: RADIOLOGY | Facility: MEDICAL CENTER | Age: 58
End: 2023-10-09
Attending: STUDENT IN AN ORGANIZED HEALTH CARE EDUCATION/TRAINING PROGRAM
Payer: COMMERCIAL

## 2023-10-09 ENCOUNTER — ANESTHESIA EVENT (OUTPATIENT)
Dept: SURGERY | Facility: MEDICAL CENTER | Age: 58
End: 2023-10-09
Payer: COMMERCIAL

## 2023-10-09 ENCOUNTER — PHARMACY VISIT (OUTPATIENT)
Dept: PHARMACY | Facility: MEDICAL CENTER | Age: 58
End: 2023-10-09
Payer: COMMERCIAL

## 2023-10-09 VITALS
SYSTOLIC BLOOD PRESSURE: 105 MMHG | HEIGHT: 64 IN | HEART RATE: 57 BPM | OXYGEN SATURATION: 97 % | WEIGHT: 190.26 LBS | DIASTOLIC BLOOD PRESSURE: 62 MMHG | TEMPERATURE: 97.4 F | BODY MASS INDEX: 32.48 KG/M2 | RESPIRATION RATE: 20 BRPM

## 2023-10-09 DIAGNOSIS — Z96.82 SACRAL NERVE STIMULATOR PRESENT: ICD-10-CM

## 2023-10-09 PROCEDURE — 700101 HCHG RX REV CODE 250: Performed by: STUDENT IN AN ORGANIZED HEALTH CARE EDUCATION/TRAINING PROGRAM

## 2023-10-09 PROCEDURE — 700105 HCHG RX REV CODE 258: Performed by: ANESTHESIOLOGY

## 2023-10-09 PROCEDURE — 160028 HCHG SURGERY MINUTES - 1ST 30 MINS LEVEL 3: Performed by: STUDENT IN AN ORGANIZED HEALTH CARE EDUCATION/TRAINING PROGRAM

## 2023-10-09 PROCEDURE — 700102 HCHG RX REV CODE 250 W/ 637 OVERRIDE(OP): Performed by: ANESTHESIOLOGY

## 2023-10-09 PROCEDURE — 700111 HCHG RX REV CODE 636 W/ 250 OVERRIDE (IP): Performed by: ANESTHESIOLOGY

## 2023-10-09 PROCEDURE — 160048 HCHG OR STATISTICAL LEVEL 1-5: Performed by: STUDENT IN AN ORGANIZED HEALTH CARE EDUCATION/TRAINING PROGRAM

## 2023-10-09 PROCEDURE — A9270 NON-COVERED ITEM OR SERVICE: HCPCS | Performed by: STUDENT IN AN ORGANIZED HEALTH CARE EDUCATION/TRAINING PROGRAM

## 2023-10-09 PROCEDURE — 160002 HCHG RECOVERY MINUTES (STAT): Performed by: STUDENT IN AN ORGANIZED HEALTH CARE EDUCATION/TRAINING PROGRAM

## 2023-10-09 PROCEDURE — 160009 HCHG ANES TIME/MIN: Performed by: STUDENT IN AN ORGANIZED HEALTH CARE EDUCATION/TRAINING PROGRAM

## 2023-10-09 PROCEDURE — 502000 HCHG MISC OR IMPLANTS RC 0278: Performed by: STUDENT IN AN ORGANIZED HEALTH CARE EDUCATION/TRAINING PROGRAM

## 2023-10-09 PROCEDURE — 160035 HCHG PACU - 1ST 60 MINS PHASE I: Performed by: STUDENT IN AN ORGANIZED HEALTH CARE EDUCATION/TRAINING PROGRAM

## 2023-10-09 PROCEDURE — RXMED WILLOW AMBULATORY MEDICATION CHARGE: Performed by: STUDENT IN AN ORGANIZED HEALTH CARE EDUCATION/TRAINING PROGRAM

## 2023-10-09 PROCEDURE — 502240 HCHG MISC OR SUPPLY RC 0272: Performed by: STUDENT IN AN ORGANIZED HEALTH CARE EDUCATION/TRAINING PROGRAM

## 2023-10-09 PROCEDURE — 700102 HCHG RX REV CODE 250 W/ 637 OVERRIDE(OP): Performed by: STUDENT IN AN ORGANIZED HEALTH CARE EDUCATION/TRAINING PROGRAM

## 2023-10-09 PROCEDURE — 700111 HCHG RX REV CODE 636 W/ 250 OVERRIDE (IP): Mod: JZ | Performed by: ANESTHESIOLOGY

## 2023-10-09 PROCEDURE — 64561 IMPLANT NEUROELECTRODES: CPT | Mod: 50 | Performed by: STUDENT IN AN ORGANIZED HEALTH CARE EDUCATION/TRAINING PROGRAM

## 2023-10-09 PROCEDURE — C1778 LEAD, NEUROSTIMULATOR: HCPCS | Performed by: STUDENT IN AN ORGANIZED HEALTH CARE EDUCATION/TRAINING PROGRAM

## 2023-10-09 PROCEDURE — C1897 LEAD, NEUROSTIM TEST KIT: HCPCS | Performed by: STUDENT IN AN ORGANIZED HEALTH CARE EDUCATION/TRAINING PROGRAM

## 2023-10-09 PROCEDURE — 160025 RECOVERY II MINUTES (STATS): Performed by: STUDENT IN AN ORGANIZED HEALTH CARE EDUCATION/TRAINING PROGRAM

## 2023-10-09 PROCEDURE — 160046 HCHG PACU - 1ST 60 MINS PHASE II: Performed by: STUDENT IN AN ORGANIZED HEALTH CARE EDUCATION/TRAINING PROGRAM

## 2023-10-09 PROCEDURE — 700111 HCHG RX REV CODE 636 W/ 250 OVERRIDE (IP): Performed by: STUDENT IN AN ORGANIZED HEALTH CARE EDUCATION/TRAINING PROGRAM

## 2023-10-09 PROCEDURE — A9270 NON-COVERED ITEM OR SERVICE: HCPCS | Performed by: ANESTHESIOLOGY

## 2023-10-09 DEVICE — IMPLANTABLE DEVICE: Type: IMPLANTABLE DEVICE | Site: BACK | Status: FUNCTIONAL

## 2023-10-09 RX ORDER — ONDANSETRON 2 MG/ML
INJECTION INTRAMUSCULAR; INTRAVENOUS PRN
Status: DISCONTINUED | OUTPATIENT
Start: 2023-10-09 | End: 2023-10-09 | Stop reason: SURG

## 2023-10-09 RX ORDER — ONDANSETRON 2 MG/ML
4 INJECTION INTRAMUSCULAR; INTRAVENOUS
Status: DISCONTINUED | OUTPATIENT
Start: 2023-10-09 | End: 2023-10-09 | Stop reason: HOSPADM

## 2023-10-09 RX ORDER — KETOROLAC TROMETHAMINE 30 MG/ML
INJECTION, SOLUTION INTRAMUSCULAR; INTRAVENOUS PRN
Status: DISCONTINUED | OUTPATIENT
Start: 2023-10-09 | End: 2023-10-09 | Stop reason: SURG

## 2023-10-09 RX ORDER — HYDROMORPHONE HYDROCHLORIDE 1 MG/ML
0.1 INJECTION, SOLUTION INTRAMUSCULAR; INTRAVENOUS; SUBCUTANEOUS
Status: DISCONTINUED | OUTPATIENT
Start: 2023-10-09 | End: 2023-10-09 | Stop reason: HOSPADM

## 2023-10-09 RX ORDER — SULFAMETHOXAZOLE AND TRIMETHOPRIM 800; 160 MG/1; MG/1
1 TABLET ORAL DAILY
Qty: 5 TABLET | Refills: 0 | Status: SHIPPED | OUTPATIENT
Start: 2023-10-09 | End: 2023-10-14

## 2023-10-09 RX ORDER — LIDOCAINE HYDROCHLORIDE 10 MG/ML
INJECTION, SOLUTION EPIDURAL; INFILTRATION; INTRACAUDAL; PERINEURAL
Status: DISCONTINUED
Start: 2023-10-09 | End: 2023-10-09 | Stop reason: HOSPADM

## 2023-10-09 RX ORDER — SCOLOPAMINE TRANSDERMAL SYSTEM 1 MG/1
1 PATCH, EXTENDED RELEASE TRANSDERMAL
Status: DISCONTINUED | OUTPATIENT
Start: 2023-10-09 | End: 2023-10-09 | Stop reason: HOSPADM

## 2023-10-09 RX ORDER — MIDAZOLAM HYDROCHLORIDE 1 MG/ML
INJECTION INTRAMUSCULAR; INTRAVENOUS PRN
Status: DISCONTINUED | OUTPATIENT
Start: 2023-10-09 | End: 2023-10-09 | Stop reason: SURG

## 2023-10-09 RX ORDER — SODIUM CHLORIDE, SODIUM LACTATE, POTASSIUM CHLORIDE, CALCIUM CHLORIDE 600; 310; 30; 20 MG/100ML; MG/100ML; MG/100ML; MG/100ML
INJECTION, SOLUTION INTRAVENOUS CONTINUOUS
Status: DISCONTINUED | OUTPATIENT
Start: 2023-10-09 | End: 2023-10-09 | Stop reason: HOSPADM

## 2023-10-09 RX ORDER — HYDROMORPHONE HYDROCHLORIDE 1 MG/ML
0.4 INJECTION, SOLUTION INTRAMUSCULAR; INTRAVENOUS; SUBCUTANEOUS
Status: DISCONTINUED | OUTPATIENT
Start: 2023-10-09 | End: 2023-10-09 | Stop reason: HOSPADM

## 2023-10-09 RX ORDER — DEXAMETHASONE SODIUM PHOSPHATE 4 MG/ML
INJECTION, SOLUTION INTRA-ARTICULAR; INTRALESIONAL; INTRAMUSCULAR; INTRAVENOUS; SOFT TISSUE PRN
Status: DISCONTINUED | OUTPATIENT
Start: 2023-10-09 | End: 2023-10-09 | Stop reason: SURG

## 2023-10-09 RX ORDER — CEFAZOLIN SODIUM 1 G/3ML
INJECTION, POWDER, FOR SOLUTION INTRAMUSCULAR; INTRAVENOUS PRN
Status: DISCONTINUED | OUTPATIENT
Start: 2023-10-09 | End: 2023-10-09 | Stop reason: SURG

## 2023-10-09 RX ORDER — SODIUM CHLORIDE, SODIUM LACTATE, POTASSIUM CHLORIDE, CALCIUM CHLORIDE 600; 310; 30; 20 MG/100ML; MG/100ML; MG/100ML; MG/100ML
INJECTION, SOLUTION INTRAVENOUS
Status: DISCONTINUED | OUTPATIENT
Start: 2023-10-09 | End: 2023-10-09 | Stop reason: SURG

## 2023-10-09 RX ORDER — HYDROMORPHONE HYDROCHLORIDE 1 MG/ML
0.2 INJECTION, SOLUTION INTRAMUSCULAR; INTRAVENOUS; SUBCUTANEOUS
Status: DISCONTINUED | OUTPATIENT
Start: 2023-10-09 | End: 2023-10-09 | Stop reason: HOSPADM

## 2023-10-09 RX ORDER — BUPIVACAINE HYDROCHLORIDE 2.5 MG/ML
INJECTION, SOLUTION EPIDURAL; INFILTRATION; INTRACAUDAL
Status: DISCONTINUED | OUTPATIENT
Start: 2023-10-09 | End: 2023-10-09 | Stop reason: HOSPADM

## 2023-10-09 RX ORDER — BUPIVACAINE HYDROCHLORIDE AND EPINEPHRINE 5; 5 MG/ML; UG/ML
INJECTION, SOLUTION EPIDURAL; INTRACAUDAL; PERINEURAL
Status: DISCONTINUED
Start: 2023-10-09 | End: 2023-10-09 | Stop reason: HOSPADM

## 2023-10-09 RX ORDER — HALOPERIDOL 5 MG/ML
1 INJECTION INTRAMUSCULAR
Status: DISCONTINUED | OUTPATIENT
Start: 2023-10-09 | End: 2023-10-09 | Stop reason: HOSPADM

## 2023-10-09 RX ORDER — ACETAMINOPHEN 500 MG
1000 TABLET ORAL ONCE
Status: COMPLETED | OUTPATIENT
Start: 2023-10-09 | End: 2023-10-09

## 2023-10-09 RX ORDER — DIPHENHYDRAMINE HYDROCHLORIDE 50 MG/ML
12.5 INJECTION INTRAMUSCULAR; INTRAVENOUS
Status: DISCONTINUED | OUTPATIENT
Start: 2023-10-09 | End: 2023-10-09 | Stop reason: HOSPADM

## 2023-10-09 RX ORDER — BUPIVACAINE HYDROCHLORIDE 2.5 MG/ML
INJECTION, SOLUTION EPIDURAL; INFILTRATION; INTRACAUDAL
Status: DISCONTINUED
Start: 2023-10-09 | End: 2023-10-09 | Stop reason: HOSPADM

## 2023-10-09 RX ORDER — LIDOCAINE HYDROCHLORIDE 10 MG/ML
INJECTION, SOLUTION INFILTRATION; PERINEURAL
Status: DISCONTINUED | OUTPATIENT
Start: 2023-10-09 | End: 2023-10-09 | Stop reason: HOSPADM

## 2023-10-09 RX ADMIN — DEXAMETHASONE SODIUM PHOSPHATE 4 MG: 4 INJECTION INTRA-ARTICULAR; INTRALESIONAL; INTRAMUSCULAR; INTRAVENOUS; SOFT TISSUE at 15:02

## 2023-10-09 RX ADMIN — ONDANSETRON 4 MG: 2 INJECTION INTRAMUSCULAR; INTRAVENOUS at 15:02

## 2023-10-09 RX ADMIN — HYDROCODONE BITARTRATE AND ACETAMINOPHEN 7.5 MG: 7.5; 325 SOLUTION ORAL at 15:35

## 2023-10-09 RX ADMIN — KETOROLAC TROMETHAMINE 30 MG: 30 INJECTION, SOLUTION INTRAMUSCULAR; INTRAVENOUS at 15:02

## 2023-10-09 RX ADMIN — MIDAZOLAM 2 MG: 1 INJECTION, SOLUTION INTRAMUSCULAR; INTRAVENOUS at 14:45

## 2023-10-09 RX ADMIN — FENTANYL CITRATE 25 MCG: 50 INJECTION, SOLUTION INTRAMUSCULAR; INTRAVENOUS at 15:34

## 2023-10-09 RX ADMIN — SODIUM CHLORIDE, POTASSIUM CHLORIDE, SODIUM LACTATE AND CALCIUM CHLORIDE: 600; 310; 30; 20 INJECTION, SOLUTION INTRAVENOUS at 14:40

## 2023-10-09 RX ADMIN — FENTANYL CITRATE 50 MCG: 50 INJECTION, SOLUTION INTRAMUSCULAR; INTRAVENOUS at 14:59

## 2023-10-09 RX ADMIN — ACETAMINOPHEN 1000 MG: 500 TABLET, FILM COATED ORAL at 13:52

## 2023-10-09 RX ADMIN — FENTANYL CITRATE 50 MCG: 50 INJECTION, SOLUTION INTRAMUSCULAR; INTRAVENOUS at 14:45

## 2023-10-09 RX ADMIN — PROPOFOL 20 MG: 10 INJECTION, EMULSION INTRAVENOUS at 14:57

## 2023-10-09 RX ADMIN — PROPOFOL 20 MG: 10 INJECTION, EMULSION INTRAVENOUS at 14:50

## 2023-10-09 RX ADMIN — PROPOFOL 20 MG: 10 INJECTION, EMULSION INTRAVENOUS at 14:46

## 2023-10-09 RX ADMIN — SCOPOLAMINE 1 PATCH: 1.5 PATCH, EXTENDED RELEASE TRANSDERMAL at 13:52

## 2023-10-09 RX ADMIN — CEFAZOLIN 2 G: 1 INJECTION, POWDER, FOR SOLUTION INTRAMUSCULAR; INTRAVENOUS at 14:41

## 2023-10-09 ASSESSMENT — FIBROSIS 4 INDEX: FIB4 SCORE: 0.59

## 2023-10-09 ASSESSMENT — PAIN DESCRIPTION - PAIN TYPE
TYPE: SURGICAL PAIN

## 2023-10-09 ASSESSMENT — PAIN SCALES - GENERAL: PAIN_LEVEL: 0

## 2023-10-09 NOTE — OP REPORT
OPERATIVE REPORT     Name: Nel Terry   : 1965    MRN: [id number]       PRE-OP DIAGNOSIS:   Urge urinary incontinence  OAB            POST-OP DIAGNOSIS:   Urge urinary incontinence  OAB            PROCEDURE:   Sacral neuromodulation basic nerve evaluation - placement of electrode array under fluoroscopic guidance, bilateral ([id number])            SURGEON:   Surgeon(s) and Role:     * Edward Bailey M.D. - Primary            ANESTHESIA: MAC            FINDINGS:      Bilateral lead placement in S3 foramina with appropriate nerve reponses     ESTIMATED BLOOD LOSS: Minimal            DRAINS: None                   SPECIMENS: None            IMPLANTS: [employer] SNM temporary leads             COMPLICATIONS: None            DISPOSITION: Discharge            CONDITION: Stable            INDICATION FOR SURGERY:     Nel Terry is a 58 y.o.  y.o. with overactive bladder with urge incontinence. She opts for trial of SNM. She was counseled that this involves a “test phase” with temporary lead implantation, followed by a full permanent implant of lad and batter if therapy results in >50% improvement in symptoms. Therapy is successful in reducing OAB symptoms in approximately 70% of patients.  This may also help with her bowel incontinence symptoms. She opts for trial of sacral neuromodulation. I recommend basic nerve evaluation for 3-5 days, performed in OR under fluoroscopic guidance due to body habitus. Preoperative bladder diary and symptom review with . Risks were reviewed including bleeding, infection, damage to surrounding organs, pain at implant site, lead migration, need for possible advanced trial. All questions answered and consent signed/witnessed.        TECHNIQUE:   I spoke with the patient in the preoperative holding area, where again risks, benefits, indications, and alternatives were reviewed, informed consent was obtained and all questions were answered.  Patient was then transferred to the  operative suite, where she was identified and procedure was confirmed.  She was then properly placed in the prone position per OR protocol.  Monitored conscious sedation was administered after pillows were placed under the lower abdomen to flatten out the sacrum, and also under the shins.  The toes were allowed to dangle freely.  At this time, patient was prepared with chlorhexidine preparation and draped in the appropriate sterile fashion utilizing an Ioban drape.      Next, a C-arm was moved into the AP position to provide fluoroscopic guidance of the sacrum.  The medial edges of the foramina were identified and marked.  C-arm was then moved into the lateral position to identify the S3 foramina.  Once the initial needle entry point was determined, local injection of plain marcaine was administered bilaterally.  A 3-1/2 inch needle was then advanced into the left S3 foramen with fluoroscopic guidance until the superomedial aspect of the S3 foramen was identified.  Proper S3 needle location was confirmed by direct visualization on lifting of the perineum, or bellowing, and plantar flexion of the great toe after a mini hook was connected to the external position test stimulator. This was repeated with a different needle on the contralateral side. The needle stylet was removed and a percutaneous lead was inserted. Lead placement was tested and confirmed by connecting the J-Hook to the top of the PNE lead. Using a push and pull technique, the needle was taken out over the lead. The above procedure was performed again on the opposite (left) side and all appropriate responses were again verified. The leads were taped down with steri strips and mastasol. The patient cables were connected to the leads and grounding pads. Patient was cleaned off and the entire region was covered with a large Tegaderm. EBL was minimal.    All counts were correct. she was moved back into supine position and transported to the PACU in stable  condition.     Lead programming int he PACU: Program 1 at 1.5 mA, Program 2 at 3 mA     She will be discharged home today with instructions to complete bladder diary and take preventive antibiotics as prescribed.       Edward Bailey MD

## 2023-10-09 NOTE — DISCHARGE INSTRUCTIONS
Post-op: What to expect after your trial lead placement    For urgent post-operative questions or concerns, please call Dr. Bailey's direct on-call cell line at [phone].     For less-urgent matters (Monday - Friday), you may send a message through [facility] or call the general Women's Health line at [phone]      Antibiotics:   You were prescribed an antibiotic to be taken daily during the trial period to prevent infection. Please pick this up after your procedure.     Pain management:  Surgical pain is controlled in most patients with only non-steroidal anti-inflammatory drugs (NSAIDs, such as ibuprofen, “Advil”), and acetaminophen (Tylenol). These drugs can be taken together without interaction.     Wound care  The incision on your back will be covered with a bandage which should not get wet during the trial period, to keep the leads protected.  You may discuss with [facility] [employer] rep or out office if there are any bandage issues.     Activity restrictions  During the trial avoid any type of heavy lifting that requires you to strain., or bending/irritation to the lower back area.  Avoid repetitive squatting or bending at the waist.    The [employer] rep will follow up with you tomorrow morning    If any questions arise, call your provider.  If your provider is not available, please feel free to call the Surgical Center at [phone].    MEDICATIONS: Resume taking daily medication.  Take prescribed pain medication with food.  If no medication is prescribed, you may take non-aspirin pain medication if needed.  PAIN MEDICATION CAN BE VERY CONSTIPATING.  Take a stool softener or laxative such as senokot, pericolace, or milk of magnesia if needed.    Last pain medication given: Tylenol at 2pm and Toradol (anti-inflammatory similar to Ibuprofen/Motrin) at 3pm  Hydrocodone-acetaminophen (Hycet) given at 3:40pm    You have a scopolamine patch that can stay on for up to 3 days for nausea. This can be removed early if  side effects are too bad (dry mouth blurry vision, confusion, mood swings).  Make sure to wash your hands and the spot behind your ear well with soap and water after throwing patch in the trash.      What to Expect Post Anesthesia    Rest and take it easy for the first 24 hours.  A responsible adult is recommended to remain with you during that time.  It is normal to feel sleepy.  We encourage you to not do anything that requires balance, judgment or coordination.    FOR 24 HOURS DO NOT:  Drive, operate machinery or run household appliances.  Drink beer or alcoholic beverages.  Make important decisions or sign legal documents.    To avoid nausea, slowly advance diet as tolerated, avoiding spicy or greasy foods for the first day.  Add more substantial food to your diet according to your provider's instructions.  Babies can be fed formula or breast milk as soon as they are hungry.  INCREASE FLUIDS AND FIBER TO AVOID CONSTIPATION.    MILD FLU-LIKE SYMPTOMS ARE NORMAL.  YOU MAY EXPERIENCE GENERALIZED MUSCLE ACHES, THROAT IRRITATION, HEADACHE AND/OR SOME NAUSEA.

## 2023-10-09 NOTE — H&P
Urogynecology and Pelvic Reconstructive Surgery Pre-op H&P    Nel Terry MRN:[id number] :1965    Referred by: Nohemy Cody DO    Reason for Visit:   Chief Complaint   Patient presents with    New Patient     Consult          Subjective     History of Presenting Illness:    Nel Terry is a 58 y.o. year old P2 with OAB/urge urinary incontinence who presents for sacral neuromodulation basic nerve evaluation     She has had a chance to review all preop counseling       Initial HPI: She was referred by Dr. Cody for the evaluation and management of urinary incontinence.     She has had a sling previously but her current urine leakage is mostly with urgency. SHMUEL improced after sling.     She has urinary frequency and if she doesn't run she will leak.     She was just seen on Tuesday for work-up of postmenopausal bleeding by Dr. Cody, status post pelvic ultrasound.  She is on HRT with patch and Prometrium.  She is a thin endometrial stripe on ultrasound and has had a discussion with Dr. Cody and a plan for hysteroscopy for full evaluation and diagnosis, especially given endocervical polyps    Prior Pelvic surgery:   10/2017: sling for incontinence ([facility]) - reports no skin incisions, likely single incision sling     Prior treatment:   Oral medication including vibegron     Fluid intake:   6 cups water  2 cups coffee   2 cups tea    Pelvic floor symptom review:     Bladder:   Voids per day: 12-15 Voids per night: 2      Urinary incontinence episodes per day: 3-4    Urge leakage:  On Movement to Bathroom and Full Bladder   Stress leakage: None   Continuous / insensible urine loss: No    Nocturnal enuresis: No    Leakage volume: Drops to moderate   Number of pads/day: 1    Bladder emptying: Complete   Voiding symptoms: Hesitancy, Weak Stream, and Double or Triple Voiding   UTI in last 12 months: no   Other urologic history: no      Prolapse:     Prolapse symptoms:  None     Bowel:    No bowel isseus, h/o diverticulitis      Pelvic Pain: no      Past medical and surgical history      Past medical history:  Past Medical History:   Diagnosis Date    Plantar fasciitis, bilateral 02/21/2020    Last Assessment & Plan:  Formatting of this note might be different from the original. - Discussed conservative measures of foot  exercise, rolling chilled bottles under arches of feet, NSAID use PRN.  - Can consider night splints, therapeutic injections with persistent sxs - Return to clinic PRN for worsening or persistent sxs.    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    Female stress incontinence 06/29/2015    Migraine 03/07/2012    Diverticulitis      Past surgical history:  Past Surgical History:   Procedure Laterality Date    PB KNEE SCOPE,MED/LAT MENISECTOMY Left 4/6/2023    Procedure: LEFT KNEE ARTHROSCOPY, LEFT PARTIAL MEDIAL MENISCECTOMY, REPAIRS AS INDICATED;  Surgeon: Logan Rice M.D.;  Location: [facility];  Service: Orthopedics    GYN SURGERY      Bladder sling    OTHER      Sinus surg    OTHER ORTHOPEDIC SURGERY      Rt rotator cuff repair    SINUSOTOMIES       Medications:has a current medication list which includes the following prescription(s): estradiol, xiidra, celecoxib, gavilyte-g, fluticasone, diphenhydramine hcl, estradiol, progesterone, celecoxib, and metoprolol sr.  Allergies:Patient has no known allergies.  Family history:  Family History   Problem Relation Age of Onset    Heart Disease Mother         58    Hypertension Mother     Hyperlipidemia Mother     Breast Cancer Maternal Aunt 30    Stomach Cancer Maternal Aunt     Cancer Paternal Grandmother     Breast Cancer Paternal Grandmother      Social history: reports that she has quit smoking. Her smoking use included cigarettes. She has a 0.25 pack-year smoking history. She has never used smokeless tobacco. She reports current alcohol use of about 1.8 oz of alcohol per week. She  "reports that she does not use drugs.    Review of systems: A full review of systems was performed, and negative with the exception of want is noted above in the HPI.        Objective        BP (P) 128/81 (BP Location: Right arm, Patient Position: Sitting, BP Cuff Size: Adult)   Pulse (P) 64   Ht (P) 5' 4\"   Wt (P) 191 lb   BMI (P) 32.79 kg/m²     Physical Exam  Vitals reviewed. Exam conducted with a chaperone present.   Constitutional:       Appearance: Normal appearance.   HENT:      Head: Normocephalic.      Mouth/Throat:      Mouth: Mucous membranes are moist.   Cardiovascular:      Rate and Rhythm: Normal rate.   Pulmonary:      Effort: Pulmonary effort is normal.   Skin:     General: Skin is warm and dry.   Neurological:      Mental Status: She is alert.   Psychiatric:         Mood and Affect: Mood normal.           Diagnostic test and records review:     Post-void residual: 33 mL, performed by Bladder Scanner    Labs:     Radiology:     Pelvic ultrasound 5/5/2023:  FINDINGS:  Both transabdominal and transvaginal scanning were performed to optimally visualize the pelvis.  UTERUS  The uterus measures 3.70 cm x 6.88 cm x 4.48 cm.  The uterine myometrium is within normal limits.  The endometrial echo complex measures 0.41 cm.  The endometrium is unremarkable in appearance and thickness for age and menstrual status. There is a cervical nabothian cyst.   OVARIES:  The right ovary measures 1.89 cm x 1.91 cm x 1.81 cm. Duplex Doppler examination of the right ovary shows normal waveforms.  The left ovary measures 3.59 cm x 1.14 cm x 2.62 cm. Duplex Doppler examination of the left ovary shows normal waveformsThere is a small amount of fluid anterior to the uterus which is nonspecific  IMPRESSION:  1.  No acute findings.  2.  Small amount of free pelvic fluid, a nonspecific finding.      Procedural:     Urodynamics 8/1/23:   Filling phase: Normal capacity, normal compliance, no stress incontinence noted, no " uninhibited detrusor contractions  Voiding phase: Complete emptying via detrusor contraction on pressure flow despite inability to empty on initial uroflow which may be secondary to cystoscopy and urethral numbing    Cystoscopy 8/1/23:   Small <3mm lesion (unclear if varicocity or mass) in left posterior  dome. Cytology sent, neg. Urothelium otherwise normal, no mesh or diverticula.     Documentation reviewed: Prior EMR Records           Assessment & Plan     Nel Terry is a 57 y.o. year old P2 with OAB/UUI after prior sling procedure for SHMUEL. We discussed my recommendations for further diagnosis and treatment at length today.     1. OAB (overactive bladder)  2. Urge urinary incontinence  3. History of suburethral sling procedure  - Incomplete response to conservative management, physical therapy, oral medication  - UDS dd not show any bladder outlet obstruction  - Cystoscopy overall normal, cytology benign  - She qualifies for third line therapies, and she was counseled on her options, which include:   Percutaneous tibial nerve stimulation (PTNS) - noninvasive and minimal risk therapy which involves nerve stimulation using an acupuncture-type needle in the ankle with non-painful nerve stimulation. This involves 12 weekly 30-minute treatment sessions, then maintenance treatments as needed, usually every 1-2 months. Symptom improvement is seen in 60-80% of patients.   Sacral neuromodulation (SNM) - this involves a “test phase” with temporary lead implantation, followed by a full permanent implant of lad and batter if therapy results in >50% improvement in symptoms. Therapy is successful in reducing OAB symptoms in approximately 70% of patients.   Bladder chemodenervation with Botox injection - this is an office/outpatient procedure involving a cystoscopy (camera in bladder) and injection of onabotulinumtoxinA. Botox has higher rates of complete symptom resolution compared to SNM (70-80%), but may result in  "temporary urinary retention requiring intermittent catheterization (6-12%). The effects of botox usually last between 6-12 months, and if successful, repeat injections are necessary.   After extensive discussion she would like to move forward with one of the more durable treatments for her urinary function, and would like to move forward with trial of sacral neuromodulation. Book for sacral neuromodulation basic nerve evaluation, placement of electrode array under fluoroscopic guidance, followed if successful by full implantation Two phase procedure discussed - the “Basic Test - peripheral nerve evaluation (PNE)\" is procedure where temporary wire leads are placed under local anesthetic with moderate sedation. These are placed in the lower back near the nerves that supply the bowel and bladder, which will stay in-place for approximately 1 week while maintaining a detailed symptom diary. Patients cannot shower with temporary leads in place and must sponge-bathe. These leads will be removed at subsequent office visit. If >50% improvement in symptoms is seed during the test phase, then a full implant procedure can be performed with a permanent wire and battery. This takes placed under sedation in the operating room with the assistance of fluoroscopy (live X-ray). The device representative from [employer] will discuss implant option types, including rechargeable and non-rechargeable batteries, and their respective benefits/drawbacks. Implants are now MRI-compatible.Real world success rates are estimated between 50-85% risks of infection, pain at implant site, and failure discussed. All questions were answered.              Edward Bailey MD, FACOG  Urogynecology and Pelvic Reconstructive Surgery  Department of Obstetrics and Gynecology  [facility]        This medical record contains text that has been entered with the assistance of computer voice recognition and dictation " software.  Therefore, it may contain unintended errors in text, spelling, punctuation, or grammar

## 2023-10-09 NOTE — ANESTHESIA POSTPROCEDURE EVALUATION
Patient: Nel Terry    Procedure Summary     Date: 10/09/23 Room / Location: [facility] ROOM 27 / SURGERY SAME DAY [facility]    Anesthesia Start: 1440 Anesthesia Stop: 1512    Procedure: SACRAL NEUROMODULATION INSERTION OF TEMPORARY NEUROSTIMULATOR ELECTRODE ARRAY (Back) Diagnosis: (OVERACTIVE BLADDER, URGE URINARY INCONTINENCE)    Surgeons: Edward Bailey M.D. Responsible Provider: Phu Marvin M.D.    Anesthesia Type: general ASA Status: 2          Final Anesthesia Type: general  Last vitals  BP   Blood Pressure: 127/80    Temp   36.3 °C (97.3 °F)    Pulse   68   Resp   15    SpO2   93 %      Anesthesia Post Evaluation    Patient location during evaluation: PACU  Patient participation: complete - patient participated  Level of consciousness: awake and alert  Pain score: 0    Airway patency: patent  Anesthetic complications: no  Cardiovascular status: adequate and hemodynamically stable  Respiratory status: acceptable  Hydration status: acceptable    PONV: none          No notable events documented.     Nurse Pain Score: 0 (NPRS)

## 2023-10-09 NOTE — ANESTHESIA PREPROCEDURE EVALUATION
Case: [id number] Date/Time: 10/09/23 1445    Procedure: SACRAL NEUROMODULATION INSERTION OF TEMPORARY NEUROSTIMULATOR ELECTRODE ARRAY    Pre-op diagnosis: OVERACTIVE BLADDER, URGE URINARY INCONTINENCE    Location: [facility] ROOM 27 / SURGERY SAME DAY [facility]    Surgeons: Edward Bailey M.D.          Relevant Problems   CARDIAC   (positive) Inappropriate sinus tachycardia   (positive) Mild mitral regurgitation   (positive) Mitral valve prolapse       Physical Exam    Airway   Mallampati: II  TM distance: >3 FB  Neck ROM: full       Cardiovascular - normal exam  Rhythm: regular  Rate: normal  (-) murmur     Dental - normal exam           Pulmonary - normal exam  Breath sounds clear to auscultation     Abdominal    Neurological - normal exam                 Anesthesia Plan    ASA 2       Plan - general       Airway plan will be ETT          Induction: intravenous    Postoperative Plan: Postoperative administration of opioids is intended.    Pertinent diagnostic labs and testing reviewed    Informed Consent:    Anesthetic plan and risks discussed with patient.    Use of blood products discussed with: patient whom consented to blood products.

## 2023-10-09 NOTE — OR NURSING
1510 Patient arrived to PACU. Report received from anesthesia and OR RN. Patient on 4L of oxygen via mask. Placed on monitor. Patients surgical site dressings CDI . Patient awake, denies pain or nausea.     1520 Reps at bedside to turn on and discuss neurostimulator with patient.     1534 Patient medicated for pain. Tolerating sips of water.     1605 Patient states pain tolerable.     1625 Medications picked up from pharmacy. Discharge education provided to patient and son over the phone and questions answered. Educated on medications.    1655 Patient discharged with son. PIV removed. All belongings gathered and sent with patient.

## 2023-10-09 NOTE — ANESTHESIA TIME REPORT
Anesthesia Start and Stop Event Times     Date Time Event    10/9/2023 1411 Ready for Procedure     1440 Anesthesia Start     1512 Anesthesia Stop        Responsible Staff  10/09/23    Name Role Begin End    Phu Marvin M.D. Anesth 1440 1512        Overtime Reason:  overtime    Comments:

## 2023-10-09 NOTE — PROGRESS NOTES
Urogynecology and Pelvic Reconstructive Surgery Follow Up    Nel Terry MRN:[id number] :1965    Referred by: Nohemy Cody DO    Reason for Visit:   Chief Complaint   Patient presents with    Gynecologic Exam     Post Op          Subjective     History of Presenting Illness:    Nel Terry is a 58 y.o. year old P2 with OAB/urge urinary incontinence who presents 3 days s/p sacral neuromodulation basic nerve evaluation      She has had a significant decrease in urinary frequency, only 5/day (down from 10+), and no urge leakages during the trial.  She is very happy with these results and would like to move forward with full implantation.  To prescribe the preventative antibiotics     Initial HPI: She was referred by Dr. Cody for the evaluation and management of urinary incontinence.      She has had a sling previously but her current urine leakage is mostly with urgency. SHMUEL improced after sling.      She has urinary frequency and if she doesn't run she will leak.      She was just seen on Tuesday for work-up of postmenopausal bleeding by Dr. Cody, status post pelvic ultrasound.  She is on HRT with patch and Prometrium.  She is a thin endometrial stripe on ultrasound and has had a discussion with Dr. Cody and a plan for hysteroscopy for full evaluation and diagnosis, especially given endocervical polyps       Prior Pelvic surgery:   10/2017: sling for incontinence ([facility]) - reports no skin incisions, likely single incision sling     Prior treatment:   Lashell medication including gemtesa     Fluid intake:   6 cups water  2 cups coffee   2 cups tea    Pelvic floor symptom review:     Bladder:   Voids per day: 12-15 Voids per night: 2      Urinary incontinence episodes per day: 3-4    Urge leakage:  On Movement to Bathroom and Full Bladder   Stress leakage: None   Continuous / insensible urine loss: No    Nocturnal enuresis: No    Leakage volume: Drops to moderate   Number of  pads/day: 1    Bladder emptying: Complete   Voiding symptoms: Hesitancy, Weak Stream, and Double or Triple Voiding   UTI in last 12 months: no   Other urologic history: no      Prolapse:     Prolapse symptoms: None     Bowel:    No bowel isseus, h/o diverticulitis      Pelvic Pain: no      Past medical and surgical history      Past medical history:  Past Medical History:   Diagnosis Date    Diabetes (HCC) 10/02/2023    prediabetic    Plantar fasciitis, bilateral 02/21/2020    Last Assessment & Plan:  Formatting of this note might be different from the original. - Discussed conservative measures of foot  exercise, rolling chilled bottles under arches of feet, NSAID use PRN.  - Can consider night splints, therapeutic injections with persistent sxs - Return to clinic PRN for worsening or persistent sxs.    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;  Service: Gynecology    HYSTEROSCOPY WITH MYOSURE  6/2/2023    Procedure: HYSTEROSCOPY DILATION AND CURETTAGE;  Surgeon: Nohemy Cody D.O.;  Location: SURGERY [facility];  Service: Gynecology    PB KNEE SCOPE,MED/LAT MENISECTOMY Left 4/6/2023    Procedure: LEFT KNEE ARTHROSCOPY, LEFT PARTIAL MEDIAL MENISCECTOMY, REPAIRS AS INDICATED;  Surgeon: Logan Rice M.D.;  Location: [facility];  Service: Orthopedics    GYN SURGERY      Bladder sling    OTHER      Sinus surg    OTHER ORTHOPEDIC SURGERY      Rt rotator cuff repair     SINUSOTOMIES       Medications:has a current medication list which includes the following prescription(s): sulfamethoxazole-trimethoprim, vitamin b-12, multiple vitamins-minerals, metoprolol sr, estradiol, fluticasone, diphenhydramine hcl, estradiol, progesterone, xiidra, and celecoxib.  Allergies:Patient has no known allergies.  Family history:  Family History   Problem Relation Age of Onset    Heart Disease Mother         58    Hypertension Mother     Hyperlipidemia Mother     Breast Cancer Maternal Aunt 30    Stomach Cancer Maternal Aunt     Cancer Paternal Grandmother     Breast Cancer Paternal Grandmother      Social history: reports that she has quit smoking. Her smoking use included cigarettes. She started smoking about 20 years ago. She has a 0.3 pack-year smoking history. She has never been exposed to tobacco smoke. She has never used smokeless tobacco. She reports current alcohol use of about 0.6 oz of alcohol per week. She reports that she does not use drugs.    Review of systems: A full review of systems was performed, and negative with the exception of want is noted above in the HPI.        Objective        /85 (BP Location: Left arm, Patient Position: Sitting, BP Cuff Size: Adult)   Wt 193 lb   BMI 33.13 kg/m²     Physical Exam  Vitals reviewed. Exam conducted with a chaperone present.   Constitutional:       Appearance: Normal appearance.   HENT:      Head: Normocephalic.      Mouth/Throat:      Mouth: Mucous membranes are moist.   Cardiovascular:      Rate and Rhythm: Normal rate.   Pulmonary:      Effort: Pulmonary effort is normal.   Skin:     General: Skin is warm and dry.   Neurological:      Mental Status: She is alert.   Psychiatric:         Mood and Affect: Mood normal.          Procedure Performed: No    Diagnostic test and records review:     Post-void residual: 33 mL, performed by Bladder Scanner    Labs:   Procedural:      Urodynamics 8/1/23:   Filling phase: Normal capacity,  normal compliance, no stress incontinence noted, no uninhibited detrusor contractions  Voiding phase: Complete emptying via detrusor contraction on pressure flow despite inability to empty on initial uroflow which may be secondary to cystoscopy and urethral numbing     Cystoscopy 8/1/23:   Small <3mm lesion (unclear if varicocity or mass) in left posterior  dome. Cytology sent, neg. Urothelium otherwise normal, no mesh or diverticula.     Radiology:     Pelvic ultrasound 5/5/2023:  FINDINGS:  Both transabdominal and transvaginal scanning were performed to optimally visualize the pelvis.  UTERUS  The uterus measures 3.70 cm x 6.88 cm x 4.48 cm.  The uterine myometrium is within normal limits.  The endometrial echo complex measures 0.41 cm.  The endometrium is unremarkable in appearance and thickness for age and menstrual status. There is a cervical nabothian cyst.   OVARIES:  The right ovary measures 1.89 cm x 1.91 cm x 1.81 cm. Duplex Doppler examination of the right ovary shows normal waveforms.  The left ovary measures 3.59 cm x 1.14 cm x 2.62 cm. Duplex Doppler examination of the left ovary shows normal waveformsThere is a small amount of fluid anterior to the uterus which is nonspecific  IMPRESSION:  1.  No acute findings.  2.  Small amount of free pelvic fluid, a nonspecific finding.    Documentation reviewed: Prior EMR Records           Assessment & Plan     Nel Terry is a 58 y.o. year old P2 with OAB/UUI after prior sling procedure for SHMUEL. We discussed my recommendations for further diagnosis and treatment at length today.     1. OAB (overactive bladder)  2. Urge urinary incontinence  3. History of suburethral sling procedure  - Incomplete response to conservative management, physical therapy, oral medication  - UDS did not show any bladder outlet obstruction  - Cystoscopy overall normal, cytology benign  -She is now status post sacral modulation basic nerve evaluation with significant Intermatic  improvement in her urinary frequency and complete resolution of urge incontinence symptoms.  This constitutes a greater than 50% improvement in symptoms and she qualifies for full implantation of neuromodulation system.  She strongly desires moving forward with therapy, and we will move forward with scheduling: Sacral neuromodulation full implantation of electrode array and implanted pulse generator under fluoroscopic guidance. Risks and benefits discussed and she expressed understanding      Risks of surgery discussed, includin) Bleeding: This can rarely be enough to require a blood transfusion.    2) Infection: We will give preventive antibiotics.  Infection may still occur and may need more antibiotics, or explant/removal of neurostimulator battery or lead  3) Pain at implantation site or uncomfortable stimulation  4) Reaction to implanted materials (rare)  5) If lead removal is required, it can be complete removed in the most patients, however sometimes small parts of the lead may not be able to be removed completely.        4. Atrophic vaginitis  She has vaginal atrophy on examination. Reviewed risks, benefits, and indications for vaginal estrogen therapy.  Continue with vaginal estrogen therapy twice weekly.                Edward Bailey MD, FACOG  Urogynecology and Pelvic Reconstructive Surgery  Department of Obstetrics and Gynecology  [facility]        This medical record contains text that has been entered with the assistance of computer voice recognition and dictation software.  Therefore, it may contain unintended errors in text, spelling, punctuation, or grammar

## 2023-10-12 ENCOUNTER — GYNECOLOGY VISIT (OUTPATIENT)
Dept: GYNECOLOGY | Facility: CLINIC | Age: 58
End: 2023-10-12
Payer: COMMERCIAL

## 2023-10-12 VITALS — SYSTOLIC BLOOD PRESSURE: 110 MMHG | WEIGHT: 193 LBS | DIASTOLIC BLOOD PRESSURE: 85 MMHG | BODY MASS INDEX: 33.13 KG/M2

## 2023-10-12 DIAGNOSIS — Z98.890 POSTOPERATIVE STATE: ICD-10-CM

## 2023-10-12 PROCEDURE — 99024 POSTOP FOLLOW-UP VISIT: CPT | Performed by: STUDENT IN AN ORGANIZED HEALTH CARE EDUCATION/TRAINING PROGRAM

## 2023-10-12 ASSESSMENT — FIBROSIS 4 INDEX: FIB4 SCORE: 0.59

## 2023-10-24 ASSESSMENT — FIBROSIS 4 INDEX: FIB4 SCORE: 0.59

## 2023-10-25 ENCOUNTER — ANESTHESIA (OUTPATIENT)
Dept: SURGERY | Facility: MEDICAL CENTER | Age: 58
End: 2023-10-25
Payer: COMMERCIAL

## 2023-10-25 ENCOUNTER — ANESTHESIA EVENT (OUTPATIENT)
Dept: SURGERY | Facility: MEDICAL CENTER | Age: 58
End: 2023-10-25
Payer: COMMERCIAL

## 2023-10-25 ENCOUNTER — APPOINTMENT (OUTPATIENT)
Dept: RADIOLOGY | Facility: MEDICAL CENTER | Age: 58
End: 2023-10-25
Attending: STUDENT IN AN ORGANIZED HEALTH CARE EDUCATION/TRAINING PROGRAM
Payer: COMMERCIAL

## 2023-10-25 ENCOUNTER — HOSPITAL ENCOUNTER (OUTPATIENT)
Facility: MEDICAL CENTER | Age: 58
End: 2023-10-25
Attending: STUDENT IN AN ORGANIZED HEALTH CARE EDUCATION/TRAINING PROGRAM | Admitting: STUDENT IN AN ORGANIZED HEALTH CARE EDUCATION/TRAINING PROGRAM
Payer: COMMERCIAL

## 2023-10-25 VITALS
BODY MASS INDEX: 33.27 KG/M2 | HEIGHT: 64 IN | WEIGHT: 194.89 LBS | HEART RATE: 52 BPM | OXYGEN SATURATION: 97 % | RESPIRATION RATE: 16 BRPM | DIASTOLIC BLOOD PRESSURE: 60 MMHG | SYSTOLIC BLOOD PRESSURE: 105 MMHG | TEMPERATURE: 97 F

## 2023-10-25 PROCEDURE — 64590 INS/RPL PRPH SAC/GSTR NPG/R: CPT | Performed by: STUDENT IN AN ORGANIZED HEALTH CARE EDUCATION/TRAINING PROGRAM

## 2023-10-25 PROCEDURE — 502000 HCHG MISC OR IMPLANTS RC 0278: Performed by: STUDENT IN AN ORGANIZED HEALTH CARE EDUCATION/TRAINING PROGRAM

## 2023-10-25 PROCEDURE — 160036 HCHG PACU - EA ADDL 30 MINS PHASE I: Performed by: STUDENT IN AN ORGANIZED HEALTH CARE EDUCATION/TRAINING PROGRAM

## 2023-10-25 PROCEDURE — 700101 HCHG RX REV CODE 250: Performed by: ANESTHESIOLOGY

## 2023-10-25 PROCEDURE — 502240 HCHG MISC OR SUPPLY RC 0272: Performed by: STUDENT IN AN ORGANIZED HEALTH CARE EDUCATION/TRAINING PROGRAM

## 2023-10-25 PROCEDURE — 160035 HCHG PACU - 1ST 60 MINS PHASE I: Performed by: STUDENT IN AN ORGANIZED HEALTH CARE EDUCATION/TRAINING PROGRAM

## 2023-10-25 PROCEDURE — 160028 HCHG SURGERY MINUTES - 1ST 30 MINS LEVEL 3: Performed by: STUDENT IN AN ORGANIZED HEALTH CARE EDUCATION/TRAINING PROGRAM

## 2023-10-25 PROCEDURE — 700111 HCHG RX REV CODE 636 W/ 250 OVERRIDE (IP): Performed by: STUDENT IN AN ORGANIZED HEALTH CARE EDUCATION/TRAINING PROGRAM

## 2023-10-25 PROCEDURE — A9270 NON-COVERED ITEM OR SERVICE: HCPCS | Performed by: STUDENT IN AN ORGANIZED HEALTH CARE EDUCATION/TRAINING PROGRAM

## 2023-10-25 PROCEDURE — 72220 X-RAY EXAM SACRUM TAILBONE: CPT

## 2023-10-25 PROCEDURE — 160048 HCHG OR STATISTICAL LEVEL 1-5: Performed by: STUDENT IN AN ORGANIZED HEALTH CARE EDUCATION/TRAINING PROGRAM

## 2023-10-25 PROCEDURE — 700102 HCHG RX REV CODE 250 W/ 637 OVERRIDE(OP): Performed by: ANESTHESIOLOGY

## 2023-10-25 PROCEDURE — A9270 NON-COVERED ITEM OR SERVICE: HCPCS | Performed by: ANESTHESIOLOGY

## 2023-10-25 PROCEDURE — 700101 HCHG RX REV CODE 250: Performed by: STUDENT IN AN ORGANIZED HEALTH CARE EDUCATION/TRAINING PROGRAM

## 2023-10-25 PROCEDURE — 160025 RECOVERY II MINUTES (STATS): Performed by: STUDENT IN AN ORGANIZED HEALTH CARE EDUCATION/TRAINING PROGRAM

## 2023-10-25 PROCEDURE — 64561 IMPLANT NEUROELECTRODES: CPT | Performed by: STUDENT IN AN ORGANIZED HEALTH CARE EDUCATION/TRAINING PROGRAM

## 2023-10-25 PROCEDURE — 160009 HCHG ANES TIME/MIN: Performed by: STUDENT IN AN ORGANIZED HEALTH CARE EDUCATION/TRAINING PROGRAM

## 2023-10-25 PROCEDURE — 160002 HCHG RECOVERY MINUTES (STAT): Performed by: STUDENT IN AN ORGANIZED HEALTH CARE EDUCATION/TRAINING PROGRAM

## 2023-10-25 PROCEDURE — 160046 HCHG PACU - 1ST 60 MINS PHASE II: Performed by: STUDENT IN AN ORGANIZED HEALTH CARE EDUCATION/TRAINING PROGRAM

## 2023-10-25 PROCEDURE — 160039 HCHG SURGERY MINUTES - EA ADDL 1 MIN LEVEL 3: Performed by: STUDENT IN AN ORGANIZED HEALTH CARE EDUCATION/TRAINING PROGRAM

## 2023-10-25 PROCEDURE — 700102 HCHG RX REV CODE 250 W/ 637 OVERRIDE(OP): Performed by: STUDENT IN AN ORGANIZED HEALTH CARE EDUCATION/TRAINING PROGRAM

## 2023-10-25 PROCEDURE — 700111 HCHG RX REV CODE 636 W/ 250 OVERRIDE (IP): Mod: JZ | Performed by: ANESTHESIOLOGY

## 2023-10-25 PROCEDURE — 700105 HCHG RX REV CODE 258: Performed by: STUDENT IN AN ORGANIZED HEALTH CARE EDUCATION/TRAINING PROGRAM

## 2023-10-25 DEVICE — IMPLANTABLE DEVICE: Type: IMPLANTABLE DEVICE | Status: FUNCTIONAL

## 2023-10-25 RX ORDER — SODIUM CHLORIDE, SODIUM LACTATE, POTASSIUM CHLORIDE, CALCIUM CHLORIDE 600; 310; 30; 20 MG/100ML; MG/100ML; MG/100ML; MG/100ML
INJECTION, SOLUTION INTRAVENOUS CONTINUOUS
Status: DISCONTINUED | OUTPATIENT
Start: 2023-10-25 | End: 2023-10-25 | Stop reason: HOSPADM

## 2023-10-25 RX ORDER — OXYCODONE HCL 5 MG/5 ML
10 SOLUTION, ORAL ORAL
Status: DISCONTINUED | OUTPATIENT
Start: 2023-10-25 | End: 2023-10-25 | Stop reason: HOSPADM

## 2023-10-25 RX ORDER — ACETAMINOPHEN 500 MG
1000 TABLET ORAL ONCE
Status: COMPLETED | OUTPATIENT
Start: 2023-10-25 | End: 2023-10-25

## 2023-10-25 RX ORDER — DEXMEDETOMIDINE HYDROCHLORIDE 100 UG/ML
INJECTION, SOLUTION INTRAVENOUS PRN
Status: DISCONTINUED | OUTPATIENT
Start: 2023-10-25 | End: 2023-10-25 | Stop reason: SURG

## 2023-10-25 RX ORDER — DIPHENHYDRAMINE HYDROCHLORIDE 50 MG/ML
12.5 INJECTION INTRAMUSCULAR; INTRAVENOUS
Status: DISCONTINUED | OUTPATIENT
Start: 2023-10-25 | End: 2023-10-25 | Stop reason: HOSPADM

## 2023-10-25 RX ORDER — LIDOCAINE HYDROCHLORIDE 10 MG/ML
INJECTION, SOLUTION INFILTRATION; PERINEURAL
Status: DISCONTINUED | OUTPATIENT
Start: 2023-10-25 | End: 2023-10-25 | Stop reason: HOSPADM

## 2023-10-25 RX ORDER — BUPIVACAINE HYDROCHLORIDE 2.5 MG/ML
INJECTION, SOLUTION EPIDURAL; INFILTRATION; INTRACAUDAL
Status: DISCONTINUED | OUTPATIENT
Start: 2023-10-25 | End: 2023-10-25 | Stop reason: HOSPADM

## 2023-10-25 RX ORDER — SCOLOPAMINE TRANSDERMAL SYSTEM 1 MG/1
1 PATCH, EXTENDED RELEASE TRANSDERMAL
Status: DISCONTINUED | OUTPATIENT
Start: 2023-10-25 | End: 2023-10-25 | Stop reason: HOSPADM

## 2023-10-25 RX ORDER — ONDANSETRON 2 MG/ML
4 INJECTION INTRAMUSCULAR; INTRAVENOUS
Status: DISCONTINUED | OUTPATIENT
Start: 2023-10-25 | End: 2023-10-25 | Stop reason: HOSPADM

## 2023-10-25 RX ORDER — MIDAZOLAM HYDROCHLORIDE 1 MG/ML
1 INJECTION INTRAMUSCULAR; INTRAVENOUS
Status: DISCONTINUED | OUTPATIENT
Start: 2023-10-25 | End: 2023-10-25 | Stop reason: HOSPADM

## 2023-10-25 RX ORDER — KETOROLAC TROMETHAMINE 30 MG/ML
INJECTION, SOLUTION INTRAMUSCULAR; INTRAVENOUS PRN
Status: DISCONTINUED | OUTPATIENT
Start: 2023-10-25 | End: 2023-10-25 | Stop reason: SURG

## 2023-10-25 RX ORDER — OXYCODONE HCL 5 MG/5 ML
5 SOLUTION, ORAL ORAL
Status: DISCONTINUED | OUTPATIENT
Start: 2023-10-25 | End: 2023-10-25 | Stop reason: HOSPADM

## 2023-10-25 RX ORDER — MIDAZOLAM HYDROCHLORIDE 1 MG/ML
INJECTION INTRAMUSCULAR; INTRAVENOUS PRN
Status: DISCONTINUED | OUTPATIENT
Start: 2023-10-25 | End: 2023-10-25 | Stop reason: SURG

## 2023-10-25 RX ORDER — HALOPERIDOL 5 MG/ML
1 INJECTION INTRAMUSCULAR
Status: DISCONTINUED | OUTPATIENT
Start: 2023-10-25 | End: 2023-10-25 | Stop reason: HOSPADM

## 2023-10-25 RX ORDER — ONDANSETRON 2 MG/ML
INJECTION INTRAMUSCULAR; INTRAVENOUS PRN
Status: DISCONTINUED | OUTPATIENT
Start: 2023-10-25 | End: 2023-10-25 | Stop reason: SURG

## 2023-10-25 RX ORDER — ACETAMINOPHEN 500 MG
1000 TABLET ORAL
Status: DISCONTINUED | OUTPATIENT
Start: 2023-10-25 | End: 2023-10-25 | Stop reason: HOSPADM

## 2023-10-25 RX ADMIN — MIDAZOLAM 2 MG: 1 INJECTION, SOLUTION INTRAMUSCULAR; INTRAVENOUS at 07:36

## 2023-10-25 RX ADMIN — DEXMEDETOMIDINE 10 MCG: 100 INJECTION, SOLUTION INTRAVENOUS at 08:02

## 2023-10-25 RX ADMIN — DEXMEDETOMIDINE 15 MCG: 100 INJECTION, SOLUTION INTRAVENOUS at 07:47

## 2023-10-25 RX ADMIN — DEXMEDETOMIDINE 10 MCG: 100 INJECTION, SOLUTION INTRAVENOUS at 07:49

## 2023-10-25 RX ADMIN — DEXMEDETOMIDINE 10 MCG: 100 INJECTION, SOLUTION INTRAVENOUS at 08:03

## 2023-10-25 RX ADMIN — DEXMEDETOMIDINE 10 MCG: 100 INJECTION, SOLUTION INTRAVENOUS at 07:56

## 2023-10-25 RX ADMIN — FENTANYL CITRATE 50 MCG: 50 INJECTION, SOLUTION INTRAMUSCULAR; INTRAVENOUS at 07:37

## 2023-10-25 RX ADMIN — DEXMEDETOMIDINE 10 MCG: 100 INJECTION, SOLUTION INTRAVENOUS at 07:38

## 2023-10-25 RX ADMIN — SODIUM CHLORIDE, POTASSIUM CHLORIDE, SODIUM LACTATE AND CALCIUM CHLORIDE: 600; 310; 30; 20 INJECTION, SOLUTION INTRAVENOUS at 07:31

## 2023-10-25 RX ADMIN — ACETAMINOPHEN 1000 MG: 500 TABLET, FILM COATED ORAL at 06:46

## 2023-10-25 RX ADMIN — DEXMEDETOMIDINE 15 MCG: 100 INJECTION, SOLUTION INTRAVENOUS at 07:44

## 2023-10-25 RX ADMIN — SCOPOLAMINE 1 PATCH: 1.5 PATCH, EXTENDED RELEASE TRANSDERMAL at 06:46

## 2023-10-25 RX ADMIN — FENTANYL CITRATE 25 MCG: 50 INJECTION, SOLUTION INTRAMUSCULAR; INTRAVENOUS at 07:53

## 2023-10-25 RX ADMIN — FENTANYL CITRATE 25 MCG: 50 INJECTION, SOLUTION INTRAMUSCULAR; INTRAVENOUS at 07:48

## 2023-10-25 RX ADMIN — VANCOMYCIN HYDROCHLORIDE 1500 MG: 1 INJECTION, POWDER, LYOPHILIZED, FOR SOLUTION INTRAVENOUS at 07:15

## 2023-10-25 RX ADMIN — DEXMEDETOMIDINE 10 MCG: 100 INJECTION, SOLUTION INTRAVENOUS at 08:01

## 2023-10-25 RX ADMIN — DEXMEDETOMIDINE 10 MCG: 100 INJECTION, SOLUTION INTRAVENOUS at 07:59

## 2023-10-25 RX ADMIN — GENTAMICIN SULFATE 100 MG: 40 INJECTION, SOLUTION INTRAMUSCULAR; INTRAVENOUS at 07:44

## 2023-10-25 RX ADMIN — KETOROLAC TROMETHAMINE 30 MG: 30 INJECTION, SOLUTION INTRAMUSCULAR; INTRAVENOUS at 07:47

## 2023-10-25 RX ADMIN — ONDANSETRON 4 MG: 2 INJECTION INTRAMUSCULAR; INTRAVENOUS at 08:12

## 2023-10-25 ASSESSMENT — PAIN SCALES - GENERAL: PAIN_LEVEL: 0

## 2023-10-25 ASSESSMENT — FIBROSIS 4 INDEX: FIB4 SCORE: 0.59

## 2023-10-25 ASSESSMENT — PAIN DESCRIPTION - PAIN TYPE: TYPE: SURGICAL PAIN

## 2023-10-25 NOTE — ANESTHESIA TIME REPORT
Anesthesia Start and Stop Event Times     Date Time Event    10/25/2023 0716 Ready for Procedure     0731 Anesthesia Start     0833 Anesthesia Stop        Responsible Staff  10/25/23    Name Role Begin End    Elle Guy M.D. Anesth 0731 0833        Overtime Reason:  no overtime (within assigned shift)    Comments:

## 2023-10-25 NOTE — OR NURSING
Arrived from PACU AXO, Pt's VSS; denies N/V; states pain is at tolerable level. Dressing CDI to lower back.     D/c orders received. IV dc'd. Pt changed into clothing with assistance. Discharge reviewed by LORETO Rodriguez, Pt and family verbalized understanding and questions answered. Patient states ready to d/c home. Pt dc'd in w/c with son.

## 2023-10-25 NOTE — DISCHARGE INSTRUCTIONS
HOME CARE INSTRUCTIONS    ACTIVITY: Rest and take it easy for the first 24 hours.  A responsible adult is recommended to remain with you during that time.  It is normal to feel sleepy.  We encourage you to not do anything that requires balance, judgment or coordination.    FOR 24 HOURS DO NOT:  Drive, operate machinery or run household appliances.  Drink beer or alcoholic beverages.  Make important decisions or sign legal documents.    SPECIAL INSTRUCTIONS: Post-op: What to expect after your surgery    For urgent post-operative questions or concerns, please call Dr. Bailey's direct on-call cell line at [phone].     For less-urgent matters (Monday - Friday), you may send a message through [facility] or call the general Women's Health line at [phone]        Pain management:  Surgical pain is controlled in most patients with only non-steroidal anti-inflammatory drugs (NSAIDs, such as ibuprofen, “Advil”), and acetaminophen (Tylenol). These drugs can be taken together without interaction. In the hospital, your nurse will give you these medications at regular intervals to both treat and to prevent pain. If these are not controlling your pain, you may ask the nurse for additional medication. When you go home, you will also take NSAIDs and acetaminophen for pain management. Sometimes, a short course of narcotics such as oxycodone and hydromorphone is are required. We do not recommend using narcotics regularly as it can lead to constipation or dizziness, and falls.     Do not drive or operate heavy machinery while using narcotics. You are unlikely to become addicted if you need to take a narcotic medication a few times within the first week of your surgery.  After the first few days to one week, your pain should decrease and you should not have pain severe enough to need narcotics. If you continue having severe pain, contact your surgeon for re-evaluation.     Wound care  The incision on your back will be closed with a  surgical glue. There are also tiny dissolving sutures beneath the skin. You can shower with these in place. In the shower, let the soapy water run over your incisions. Do not scrub or wipe your incisions. Keep the incisions dry for the remainder of the day/night. The glue will fall off on its own after a few weeks. Small sutures that pop out through the skin are normal and will dissolve over time.    Call us if you feel increasing pain, redness, discharge or warmth at the incision.     Activity restrictions  During the first 2 weeks avoid any type of heavy lifting that requires you to strain., or bending/irritation to the lower back area.  Gentle walking is good exercise. Start with about 10 minutes a day when you feel ready and build up gradually. Avoid repetitive squatting or bending at the waist.    DIET: To avoid nausea, slowly advance diet as tolerated, avoiding spicy or greasy foods for the first day.  Add more substantial food to your diet according to your physician's instructions.  Babies can be fed formula or breast milk as soon as they are hungry.  INCREASE FLUIDS AND FIBER TO AVOID CONSTIPATION.    MEDICATIONS: Resume taking daily medication.  Take prescribed pain medication with food.  If no medication is prescribed, you may take non-aspirin pain medication if needed.  PAIN MEDICATION CAN BE VERY CONSTIPATING.  Take a stool softener or laxative such as senokot, pericolace, or milk of magnesia if needed.    Last pain medication given at Tylenol at 6:46 am.    A follow-up appointment should be arranged with your doctor; call to schedule.    You should CALL YOUR PHYSICIAN if you develop:  Fever greater than 101 degrees F.  Pain not relieved by medication, or persistent nausea or vomiting.  Excessive bleeding (blood soaking through dressing) or unexpected drainage from the wound.  Extreme redness or swelling around the incision site, drainage of pus or foul smelling drainage.  Inability to urinate or empty  your bladder within 8 hours.  Problems with breathing or chest pain.    You should call 911 if you develop problems with breathing or chest pain.  If you are unable to contact your doctor or surgical center, you should go to the nearest emergency room or urgent care center.  Physician's telephone #: [phone]    MILD FLU-LIKE SYMPTOMS ARE NORMAL.  YOU MAY EXPERIENCE GENERALIZED MUSCLE ACHES, THROAT IRRITATION, HEADACHE AND/OR SOME NAUSEA.    If any questions arise, call your doctor.  If your doctor is not available, please feel free to call the Surgical Center at [phone].  The Center is open Monday through Friday from 7AM to 7PM.      A registered nurse may call you a few days after your surgery to see how you are doing after your procedure.    You may also receive a survey in the mail within the next two weeks and we ask that you take a few moments to complete the survey and return it to us.  Our goal is to provide you with very good care and we value your comments.     Depression / Suicide Risk    As you are discharged from this [facility] facility, it is important to learn how to keep safe from harming yourself.    Recognize the warning signs:  Abrupt changes in personality, positive or negative- including increase in energy   Giving away possessions  Change in eating patterns- significant weight changes-  positive or negative  Change in sleeping patterns- unable to sleep or sleeping all the time   Unwillingness or inability to communicate  Depression  Unusual sadness, discouragement and loneliness  Talk of wanting to die  Neglect of personal appearance   Rebelliousness- reckless behavior  Withdrawal from people/activities they love  Confusion- inability to concentrate     If you or a loved one observes any of these behaviors or has concerns about self-harm, here's what you can do:  Talk about it- your feelings and reasons for harming yourself  Remove any means that you might use to hurt yourself  (examples: pills, rope, extension cords, firearm)  Get professional help from the community (Mental Health, Substance Abuse, psychological counseling)  Do not be alone:Call your Safe Contact- someone whom you trust who will be there for you.  Call your local CRISIS HOTLINE 422-0075 or 481-495-5849  Call your local Children's Mobile Crisis Response Team [place] (755) 381-1056 or www.Webroot  Call the toll free National Suicide Prevention Hotlines   National Suicide Prevention Lifeline 013-032-BQZX (2277)  [facility] Line Network 800-SUICIDE (482-3789)    I acknowledge receipt and understanding of these Home Care instructions.

## 2023-10-25 NOTE — ANESTHESIA POSTPROCEDURE EVALUATION
Patient: Nel Terry    Procedure Summary     Date: 10/25/23 Room / Location: [place] [address] / SURGERY [facility]    Anesthesia Start: 0731 Anesthesia Stop: 0833    Procedure: SACRAL NEUROMODULATION INSERTION OF NEUROSTIMULATOR ELECTRODE ARRAY, PLACEMENT OF IMPLANTED PULSE GENERATOR Diagnosis: (OVERACTIVE BLADDER URINARY URGE INCONTINENCE)    Surgeons: Edward Bailey M.D. Responsible Provider: Elle Guy M.D.    Anesthesia Type: general, MAC ASA Status: 2          Final Anesthesia Type: general, MAC  Last vitals  BP   Blood Pressure: 105/60    Temp   36.1 °C (97 °F)    Pulse   (!) 52   Resp   16    SpO2   97 %      Anesthesia Post Evaluation    Patient location during evaluation: PACU  Patient participation: complete - patient participated  Level of consciousness: awake and alert  Pain score: 0    Airway patency: patent  Anesthetic complications: no  Cardiovascular status: adequate and hemodynamically stable  Respiratory status: acceptable  Hydration status: acceptable    PONV: none          No notable events documented.     Nurse Pain Score: 0 (NPRS)

## 2023-10-25 NOTE — ANESTHESIA PREPROCEDURE EVALUATION
Case: [id number] Date/Time: 10/25/23 0715    Procedure: SACRAL NEUROMODULATION INSERTION OF NEUROSTIMULATOR ELECTRODE ARRAY, PLACEMENT OF IMPLANTED PULSE GENERATOR    Pre-op diagnosis: OVERACTIVE BLADDER URINARY URGE INCONTINENCE    Location: [facility] / SURGERY [facility]    Surgeons: Edward Bailey M.D.          Relevant Problems   CARDIAC   (positive) Inappropriate sinus tachycardia   (positive) Mild mitral regurgitation   (positive) Mitral valve prolapse       Physical Exam    Airway   Mallampati: II  TM distance: >3 FB  Neck ROM: limited       Cardiovascular   Rhythm: regular  Rate: normal     Dental - normal exam           Pulmonary   Breath sounds clear to auscultation     Abdominal - normal exam     Neurological              Anesthesia Plan    ASA 2       Plan - general and MAC       Airway plan will be natural airway          Induction: intravenous      Pertinent diagnostic labs and testing reviewed    Informed Consent:    Anesthetic plan and risks discussed with patient.    Use of blood products discussed with: whom consented to blood products.

## 2023-11-07 ENCOUNTER — GYNECOLOGY VISIT (OUTPATIENT)
Dept: GYNECOLOGY | Facility: CLINIC | Age: 58
End: 2023-11-07
Payer: COMMERCIAL

## 2023-11-07 VITALS
HEIGHT: 64 IN | SYSTOLIC BLOOD PRESSURE: 120 MMHG | WEIGHT: 199 LBS | BODY MASS INDEX: 33.97 KG/M2 | DIASTOLIC BLOOD PRESSURE: 81 MMHG

## 2023-11-07 DIAGNOSIS — Z98.890 POSTOPERATIVE STATE: ICD-10-CM

## 2023-11-07 PROCEDURE — 99024 POSTOP FOLLOW-UP VISIT: CPT | Performed by: STUDENT IN AN ORGANIZED HEALTH CARE EDUCATION/TRAINING PROGRAM

## 2023-11-07 ASSESSMENT — FIBROSIS 4 INDEX: FIB4 SCORE: 0.59

## 2023-11-07 NOTE — PROGRESS NOTES
4Urogynecology and Pelvic Reconstructive Surgery Follow Up    Nel Terry [id number] :1965    Referred by: Nohemy Cody DO    Reason for Visit:   No chief complaint on file.        Subjective     History of Presenting Illness:    Nel Terry is a 58 y.o. year old P2 with OAB/urge urinary incontinence who presents 2 weeks s/p sacral neuromodulation full implantation     She still notes significant improvement with SNM, but less than during her trial.  She has not reprogrammed yet.  She has minimal pain and feels like her wound is healing well.     Initial HPI: She was referred by Dr. Cody for the evaluation and management of urinary incontinence.      She has had a sling previously but her current urine leakage is mostly with urgency. SHMUEL improced after sling.      She has urinary frequency and if she doesn't run she will leak.      She was just seen on Tuesday for work-up of postmenopausal bleeding by Dr. Cody, status post pelvic ultrasound.  She is on HRT with patch and Prometrium.  She is a thin endometrial stripe on ultrasound and has had a discussion with Dr. Cody and a plan for hysteroscopy for full evaluation and diagnosis, especially given endocervical polyps       Prior Pelvic surgery:   10/2017: sling for incontinence ([facility]) - reports no skin incisions, likely single incision sling     Prior treatment:   Lashell medication including gemtesa     Fluid intake:   6 cups water  2 cups coffee   2 cups tea    Pelvic floor symptom review:     Bladder:   Voids per day: 12-15 Voids per night: 2      Urinary incontinence episodes per day: 3-4    Urge leakage:  On Movement to Bathroom and Full Bladder   Stress leakage: None   Continuous / insensible urine loss: No    Nocturnal enuresis: No    Leakage volume: Drops to moderate   Number of pads/day: 1    Bladder emptying: Complete   Voiding symptoms: Hesitancy, Weak Stream, and Double or Triple Voiding   UTI in last 12  months: no   Other urologic history: no      Prolapse:     Prolapse symptoms: None     Bowel:    No bowel isseus, h/o diverticulitis      Pelvic Pain: no      Past medical and surgical history      Past medical history:  Past Medical History:   Diagnosis Date    Diabetes (HCC) 10/02/2023    prediabetic    Plantar fasciitis, bilateral 02/21/2020    Last Assessment & Plan:  Formatting of this note might be different from the original. - Discussed conservative measures of foot  exercise, rolling chilled bottles under arches of feet, NSAID use PRN.  - Can consider night splints, therapeutic injections with persistent sxs - Return to clinic PRN for worsening or persistent sxs.    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    Female stress incontinence 06/29/2015    Migraine 03/07/2012    Arrhythmia     tachy    Arthritis     Diverticulitis     Gynecological disorder     High cholesterol     not medicated    Sleep apnea     not using cpap, had surgery     Past surgical history:  Past Surgical History:   Procedure Laterality Date    OR INSERTION/RPLCMT PERIPHERAL/GASTRIC NPGR N/A 10/25/2023    Procedure: SACRAL NEUROMODULATION INSERTION OF NEUROSTIMULATOR ELECTRODE ARRAY, PLACEMENT OF IMPLANTED PULSE GENERATOR;  Surgeon: Edward Bailey M.D.;  Location: SURGERY [facility];  Service: Gynecology    OR PERCUT IMPLANT,NEUROELEC,SACRAL NERVE  10/9/2023    Procedure: SACRAL NEUROMODULATION INSERTION OF TEMPORARY NEUROSTIMULATOR ELECTRODE ARRAY;  Surgeon: Edward Bailey M.D.;  Location: SURGERY SAME DAY [facility];  Service: Gynecology    HYSTEROSCOPY WITH MYOSURE  6/2/2023    Procedure: HYSTEROSCOPY DILATION AND CURETTAGE;  Surgeon: Nohemy Cody D.O.;  Location: SURGERY [facility];  Service: Gynecology    PB KNEE SCOPE,MED/LAT MENISECTOMY Left 4/6/2023    Procedure: LEFT KNEE ARTHROSCOPY, LEFT PARTIAL MEDIAL MENISCECTOMY, REPAIRS AS INDICATED;  Surgeon: Logan Rice M.D.;  Location: [facility];   Service: Orthopedics    GYN SURGERY      Bladder sling    OTHER      Sinus surg    OTHER ORTHOPEDIC SURGERY      Rt rotator cuff repair    SINUSOTOMIES       Medications:has a current medication list which includes the following prescription(s): vitamin b-12, multiple vitamins-minerals, metoprolol sr, estradiol, xiidra, fluticasone, diphenhydramine, estradiol, and progesterone.  Allergies:Patient has no known allergies.  Family history:  Family History   Problem Relation Age of Onset    Heart Disease Mother         58    Hypertension Mother     Hyperlipidemia Mother     Breast Cancer Maternal Aunt 30    Stomach Cancer Maternal Aunt     Cancer Paternal Grandmother     Breast Cancer Paternal Grandmother      Social history: reports that she has quit smoking. Her smoking use included cigarettes. She started smoking about 20 years ago. She has a 0.3 pack-year smoking history. She has never been exposed to tobacco smoke. She has never used smokeless tobacco. She reports current alcohol use of about 0.6 oz of alcohol per week. She reports that she does not use drugs.    Review of systems: A full review of systems was performed, and negative with the exception of want is noted above in the HPI.        Objective        There were no vitals taken for this visit.    Physical Exam  Vitals reviewed. Exam conducted with a chaperone present.   Constitutional:       Appearance: Normal appearance.   HENT:      Head: Normocephalic.      Mouth/Throat:      Mouth: Mucous membranes are moist.   Cardiovascular:      Rate and Rhythm: Normal rate.   Pulmonary:      Effort: Pulmonary effort is normal.   Skin:     General: Skin is warm and dry.   Neurological:      Mental Status: She is alert.   Psychiatric:         Mood and Affect: Mood normal.     Pocket incision well-healed without induration or erythema.  Suture cut from stimulator site       Procedure Performed: Reprogramming.  She was changed to program #2 at level 3 where she felt  ipsilateral vagina saddle region    Diagnostic test and records review:     Post-void residual: (prior) 33 mL, performed by Bladder Scanner    Labs:   Procedural:      Urodynamics 8/1/23:   Filling phase: Normal capacity, normal compliance, no stress incontinence noted, no uninhibited detrusor contractions  Voiding phase: Complete emptying via detrusor contraction on pressure flow despite inability to empty on initial uroflow which may be secondary to cystoscopy and urethral numbing     Cystoscopy 8/1/23:   Small <3mm lesion (unclear if varicocity or mass) in left posterior  dome. Cytology sent, neg. Urothelium otherwise normal, no mesh or diverticula.     Radiology:     Pelvic ultrasound 5/5/2023:  FINDINGS:  Both transabdominal and transvaginal scanning were performed to optimally visualize the pelvis.  UTERUS  The uterus measures 3.70 cm x 6.88 cm x 4.48 cm.  The uterine myometrium is within normal limits.  The endometrial echo complex measures 0.41 cm.  The endometrium is unremarkable in appearance and thickness for age and menstrual status. There is a cervical nabothian cyst.   OVARIES:  The right ovary measures 1.89 cm x 1.91 cm x 1.81 cm. Duplex Doppler examination of the right ovary shows normal waveforms.  The left ovary measures 3.59 cm x 1.14 cm x 2.62 cm. Duplex Doppler examination of the left ovary shows normal waveformsThere is a small amount of fluid anterior to the uterus which is nonspecific  IMPRESSION:  1.  No acute findings.  2.  Small amount of free pelvic fluid, a nonspecific finding.    Documentation reviewed: Prior EMR Records           Assessment & Plan     Nel Terry is a 58 y.o. year old P2 with OAB/UUI after prior sling procedure for SHMUEL. We discussed my recommendations for further diagnosis and treatment at length today.     1. OAB (overactive bladder)  2. Urge urinary incontinence  3. History of suburethral sling procedure  - Incomplete response to conservative management,  physical therapy, oral medication  - UDS did not show any bladder outlet obstruction  - Cystoscopy overall normal, cytology benign  - s/p sacral modulation full implantation with improvement in symptoms.  She was reprogrammed today and counseled on reprogramming course in order to get optimal improvement.  Patience encouraged    4. Atrophic vaginitis  She has vaginal atrophy on examination. Reviewed risks, benefits, and indications for vaginal estrogen therapy.  Continue with vaginal estrogen therapy twice weekly.     Follow up in 2-3 months           Edward Bailey MD, FACOG  Urogynecology and Pelvic Reconstructive Surgery  Department of Obstetrics and Gynecology  [facility]        This medical record contains text that has been entered with the assistance of computer voice recognition and dictation software.  Therefore, it may contain unintended errors in text, spelling, punctuation, or grammar

## 2023-11-14 ENCOUNTER — OFFICE VISIT (OUTPATIENT)
Dept: URGENT CARE | Facility: PHYSICIAN GROUP | Age: 58
End: 2023-11-14
Payer: COMMERCIAL

## 2023-11-14 ENCOUNTER — HOSPITAL ENCOUNTER (OUTPATIENT)
Dept: RADIOLOGY | Facility: MEDICAL CENTER | Age: 58
End: 2023-11-14
Attending: NURSE PRACTITIONER
Payer: COMMERCIAL

## 2023-11-14 VITALS
RESPIRATION RATE: 18 BRPM | OXYGEN SATURATION: 95 % | HEIGHT: 64 IN | WEIGHT: 193 LBS | SYSTOLIC BLOOD PRESSURE: 126 MMHG | TEMPERATURE: 99.4 F | HEART RATE: 96 BPM | BODY MASS INDEX: 32.95 KG/M2 | DIASTOLIC BLOOD PRESSURE: 72 MMHG

## 2023-11-14 DIAGNOSIS — R10.32 LLQ PAIN: ICD-10-CM

## 2023-11-14 DIAGNOSIS — K57.92 DIVERTICULITIS: ICD-10-CM

## 2023-11-14 PROCEDURE — 3074F SYST BP LT 130 MM HG: CPT | Performed by: NURSE PRACTITIONER

## 2023-11-14 PROCEDURE — 99214 OFFICE O/P EST MOD 30 MIN: CPT | Performed by: NURSE PRACTITIONER

## 2023-11-14 PROCEDURE — 700117 HCHG RX CONTRAST REV CODE 255: Performed by: NURSE PRACTITIONER

## 2023-11-14 PROCEDURE — 3078F DIAST BP <80 MM HG: CPT | Performed by: NURSE PRACTITIONER

## 2023-11-14 PROCEDURE — 74177 CT ABD & PELVIS W/CONTRAST: CPT

## 2023-11-14 RX ORDER — AMOXICILLIN AND CLAVULANATE POTASSIUM 875; 125 MG/1; MG/1
1 TABLET, FILM COATED ORAL 2 TIMES DAILY
Qty: 14 TABLET | Refills: 0 | Status: SHIPPED | OUTPATIENT
Start: 2023-11-14 | End: 2023-11-21

## 2023-11-14 RX ADMIN — IOHEXOL 100 ML: 350 INJECTION, SOLUTION INTRAVENOUS at 11:15

## 2023-11-14 RX ADMIN — IOHEXOL 25 ML: 240 INJECTION, SOLUTION INTRATHECAL; INTRAVASCULAR; INTRAVENOUS; ORAL at 11:15

## 2023-11-14 ASSESSMENT — FIBROSIS 4 INDEX: FIB4 SCORE: 0.59

## 2023-11-14 ASSESSMENT — ENCOUNTER SYMPTOMS
MYALGIAS: 0
NAUSEA: 0
ABDOMINAL PAIN: 1
CHILLS: 0
DIARRHEA: 1
DIZZINESS: 1
VOMITING: 0
FEVER: 0
HEADACHES: 1

## 2023-11-14 NOTE — PROGRESS NOTES
Subjective     Nel Terry is a 58 y.o. female who presents with Other (X 3 days diverticulitis )            HPI  New problem.  Patient is a very pleasant 58-year-old female who presents with lower left quadrant pain x3 days.  She reports diarrhea with this.  She denies fever, chills, myalgia, headache, nausea.  She reports her symptoms started Sunday and have progressed since that time.  She does have a history of diverticulitis in her past per her report.  She has tried ibuprofen with no relief of her symptoms.    Patient has no known allergies.  Current Outpatient Medications on File Prior to Visit   Medication Sig Dispense Refill    Cyanocobalamin (VITAMIN B-12) 500 MCG SL Tab Place 1 Tablet under the tongue every day.      Multiple Vitamins-Minerals (MULTIVITAMIN ADULTS 50+ PO) Take 1 Tablet by mouth every day.      metoprolol SR (TOPROL XL) 25 MG TABLET SR 24 HR TAKE 1 TABLET BY MOUTH EVERY DAY 90 Tablet 3    estradiol (ESTRACE VAGINAL) 0.1 MG/GM vaginal cream Apply 1g cream inside vagina twice per week (Patient taking differently: Insert 1 g into the vagina two times a week. Apply 1g cream inside vagina twice per week) 1 Each 3    XIIDRA 5 % Solution Take 1 Drop by mouth 2 times a day.      fluticasone (FLONASE) 50 MCG/ACT nasal spray Administer 1 Spray into affected nostril(S) every day.      diphenhydrAMINE (BENADRYL) 25 MG Tab Take 25 mg by mouth at bedtime as needed (for sleep).      estradiol (CLIMARA) 0.075 MG/24HR PATCH WEEKLY Place 1 Patch on the skin every 7 days. 12 Patch 3    progesterone (PROMETRIUM) 200 MG capsule Take 1 Capsule by mouth every evening. 90 Capsule 3     No current facility-administered medications on file prior to visit.     Social History     Socioeconomic History    Marital status: Single     Spouse name: Not on file    Number of children: Not on file    Years of education: Not on file    Highest education level: Not on file   Occupational History    Not on file  "  Tobacco Use    Smoking status: Former     Average packs/day: (0.3 ttl pk-yrs)     Types: Cigarettes     Start date: 2003     Passive exposure: Never    Smokeless tobacco: Never   Vaping Use    Vaping Use: Never used   Substance and Sexual Activity    Alcohol use: Yes     Alcohol/week: 0.6 oz     Types: 1 Standard drinks or equivalent per week    Drug use: Never    Sexual activity: Not Currently     Partners: Male     Birth control/protection: Post-Menopausal   Other Topics Concern    Not on file   Social History Narrative    Not on file     Social Determinants of Health     Financial Resource Strain: Not on file   Food Insecurity: Not on file   Transportation Needs: Not on file   Physical Activity: Not on file   Stress: Not on file   Social Connections: Not on file   Intimate Partner Violence: Not on file   Housing Stability: Not on file     Breast Cancer-related family history is not on file.        Review of Systems   Constitutional:  Positive for malaise/fatigue. Negative for chills and fever.   Gastrointestinal:  Positive for abdominal pain and diarrhea. Negative for nausea and vomiting.   Genitourinary: Negative.    Musculoskeletal:  Negative for myalgias.   Neurological:  Positive for dizziness and headaches.   All other systems reviewed and are negative.             Objective     /72   Pulse 96   Temp 37.4 °C (99.4 °F) (Temporal)   Resp 18   Ht 1.626 m (5' 4\")   Wt 87.5 kg (193 lb)   SpO2 95%   BMI 33.13 kg/m²      Physical Exam  Vitals reviewed.   Constitutional:       General: She is not in acute distress.     Appearance: She is well-developed.   Cardiovascular:      Rate and Rhythm: Normal rate and regular rhythm.      Heart sounds: Normal heart sounds. No murmur heard.  Pulmonary:      Effort: Pulmonary effort is normal. No respiratory distress.      Breath sounds: Normal breath sounds.   Abdominal:      General: Bowel sounds are normal.      Palpations: Abdomen is soft.      Tenderness: " There is abdominal tenderness in the left lower quadrant. There is guarding.      Comments: Exquisite tenderness to LLQ   Musculoskeletal:         General: Normal range of motion.      Comments: Moves all 4 extremities normally   Skin:     General: Skin is warm and dry.   Neurological:      Mental Status: She is alert and oriented to person, place, and time.   Psychiatric:         Behavior: Behavior normal.         Thought Content: Thought content normal.                             Assessment & Plan        1. Diverticulitis        2. LLQ pain  CT-ABDOMEN-PELVIS WITH    amoxicillin-clavulanate (AUGMENTIN) 875-125 MG Tab        Patient with diverticulitis on CT, no abscess evident.   Reviewed results with her.  Augmentin.  Consider clear fluid diet today.  ED precautions given.  Differential diagnosis, natural history, supportive care, and indications for immediate follow-up were discussed.

## 2023-12-08 ENCOUNTER — APPOINTMENT (OUTPATIENT)
Dept: CARDIOLOGY | Facility: MEDICAL CENTER | Age: 58
End: 2023-12-08
Attending: INTERNAL MEDICINE
Payer: COMMERCIAL

## 2023-12-18 ENCOUNTER — OFFICE VISIT (OUTPATIENT)
Dept: CARDIOLOGY | Facility: MEDICAL CENTER | Age: 58
End: 2023-12-18
Attending: INTERNAL MEDICINE
Payer: COMMERCIAL

## 2023-12-18 VITALS
WEIGHT: 194 LBS | HEIGHT: 64 IN | SYSTOLIC BLOOD PRESSURE: 96 MMHG | BODY MASS INDEX: 33.12 KG/M2 | DIASTOLIC BLOOD PRESSURE: 70 MMHG | HEART RATE: 78 BPM | OXYGEN SATURATION: 93 %

## 2023-12-18 DIAGNOSIS — I73.00 RAYNAUD'S PHENOMENON WITHOUT GANGRENE: ICD-10-CM

## 2023-12-18 DIAGNOSIS — I34.0 MILD MITRAL REGURGITATION: ICD-10-CM

## 2023-12-18 DIAGNOSIS — I47.11 INAPPROPRIATE SINUS TACHYCARDIA (HCC): ICD-10-CM

## 2023-12-18 DIAGNOSIS — E78.5 DYSLIPIDEMIA: ICD-10-CM

## 2023-12-18 DIAGNOSIS — I34.1 MITRAL VALVE PROLAPSE: ICD-10-CM

## 2023-12-18 PROCEDURE — 3074F SYST BP LT 130 MM HG: CPT | Performed by: INTERNAL MEDICINE

## 2023-12-18 PROCEDURE — 99214 OFFICE O/P EST MOD 30 MIN: CPT | Performed by: INTERNAL MEDICINE

## 2023-12-18 PROCEDURE — 3078F DIAST BP <80 MM HG: CPT | Performed by: INTERNAL MEDICINE

## 2023-12-18 PROCEDURE — 99212 OFFICE O/P EST SF 10 MIN: CPT | Performed by: INTERNAL MEDICINE

## 2023-12-18 RX ORDER — COVID-19 ANTIGEN TEST
KIT MISCELLANEOUS
COMMUNITY
Start: 2023-12-08 | End: 2024-01-22

## 2023-12-18 RX ORDER — METOPROLOL SUCCINATE 25 MG/1
25 TABLET, EXTENDED RELEASE ORAL DAILY
Qty: 90 TABLET | Refills: 3 | Status: SHIPPED | OUTPATIENT
Start: 2023-12-18

## 2023-12-18 ASSESSMENT — FIBROSIS 4 INDEX: FIB4 SCORE: 0.59

## 2023-12-18 NOTE — PATIENT INSTRUCTIONS
Consider Nasuni ([email or web address]/) $ DO NOT SUBSCRIBE WE WILL ALWAYS REVIEW YOUR TRACINGS ON AmplidataHART or Smartwatch such as Apple Watch $$$$ for monitoring of the heart palpitations.  This is a small device that syncs to your phone with Bluetooth that can tell you your heart rhythm.  You can send us a screencapture of the tracings with [employer].        Typical Patch Monitor looks like this Biosceptreo or PureSensedwayne      Alternatively consider a pulse oximeter and let us know if Oxygen is <90 or pulse is <50 or > 110 while resting

## 2023-12-18 NOTE — PROGRESS NOTES
Chief Complaint   Patient presents with    Dyslipidemia    Tachycardia       Subjective     Nel Terry is a 58 y.o. 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 - Discussed conservative measures of foot  exercise, rolling chilled bottles under arches of feet, NSAID use PRN.  - Can consider night splints, therapeutic injections with persistent sxs - Return to clinic PRN for worsening or persistent sxs.    Sleep apnea     not using cpap, had surgery     Past Surgical History:   Procedure Laterality Date    MD INSERTION/RPLCMT PERIPHERAL/GASTRIC NPGR N/A 10/25/2023    Procedure: SACRAL NEUROMODULATION INSERTION OF NEUROSTIMULATOR ELECTRODE ARRAY, PLACEMENT OF IMPLANTED PULSE GENERATOR;  Surgeon: Edward Bailey M.D.;  Location: SURGERY [facility];  Service: Gynecology    MD PERCUT IMPLANT,NEUROELEC,SACRAL NERVE  10/9/2023    Procedure: SACRAL NEUROMODULATION INSERTION OF TEMPORARY NEUROSTIMULATOR ELECTRODE ARRAY;  Surgeon: Edward Bailey M.D.;  Location: SURGERY SAME DAY [facility];  Service: Gynecology    HYSTEROSCOPY WITH MYOSURE  6/2/2023    Procedure: HYSTEROSCOPY DILATION AND CURETTAGE;  Surgeon: Nohemy Cody D.O.;  Location: SURGERY [facility];  Service: Gynecology    PB KNEE SCOPE,MED/LAT MENISECTOMY Left 4/6/2023    Procedure: LEFT KNEE ARTHROSCOPY, LEFT PARTIAL MEDIAL MENISCECTOMY, REPAIRS AS INDICATED;  Surgeon: Logan iRce M.D.;  Location: [facility];  Service: Orthopedics    GYN SURGERY       Bladder sling    OTHER      Sinus surg    OTHER ORTHOPEDIC SURGERY      Rt rotator cuff repair    SINUSOTOMIES       Family History   Problem Relation Age of Onset    Heart Disease Mother         58    Hypertension Mother     Hyperlipidemia Mother     Breast Cancer Maternal Aunt 30    Stomach Cancer Maternal Aunt     Cancer Paternal Grandmother     Breast Cancer Paternal Grandmother      Social History     Socioeconomic History    Marital status: Single     Spouse name: Not on file    Number of children: Not on file    Years of education: Not on file    Highest education level: Not on file   Occupational History    Not on file   Tobacco Use    Smoking status: Former     Average packs/day: (0.3 ttl pk-yrs)     Types: Cigarettes     Start date: 2003     Passive exposure: Never    Smokeless tobacco: Never   Vaping Use    Vaping Use: Never used   Substance and Sexual Activity    Alcohol use: Yes     Alcohol/week: 0.6 oz     Types: 1 Standard drinks or equivalent per week     Comment: occ    Drug use: Not Currently    Sexual activity: Not Currently     Partners: Male     Birth control/protection: Post-Menopausal   Other Topics Concern    Not on file   Social History Narrative    Not on file     Social Determinants of Health     Financial Resource Strain: Not on file   Food Insecurity: Not on file   Transportation Needs: Not on file   Physical Activity: Not on file   Stress: Not on file   Social Connections: Not on file   Intimate Partner Violence: Not on file   Housing Stability: Not on file     No Known Allergies  Outpatient Encounter Medications as of 12/18/2023   Medication Sig Dispense Refill    FLOWFLEX COVID-19 AG HOME TEST Kit FOLLOW INSTRUCTIONS INCLUDED WITH THE PACKAGE.      metoprolol SR (TOPROL XL) 25 MG TABLET SR 24 HR Take 1 Tablet by mouth every day. 90 Tablet 3    Cyanocobalamin (VITAMIN B-12) 500 MCG SL Tab Place 1 Tablet under the tongue every day.      estradiol (ESTRACE VAGINAL) 0.1 MG/GM vaginal  "cream Apply 1g cream inside vagina twice per week (Patient taking differently: Insert 1 g into the vagina two times a week. Apply 1g cream inside vagina twice per week) 1 Each 3    fluticasone (FLONASE) 50 MCG/ACT nasal spray Administer 1 Spray into affected nostril(S) every day.      diphenhydrAMINE (BENADRYL) 25 MG Tab Take 25 mg by mouth at bedtime as needed (for sleep).      estradiol (CLIMARA) 0.075 MG/24HR PATCH WEEKLY Place 1 Patch on the skin every 7 days. 12 Patch 3    progesterone (PROMETRIUM) 200 MG capsule Take 1 Capsule by mouth every evening. 90 Capsule 3    [DISCONTINUED] Multiple Vitamins-Minerals (MULTIVITAMIN ADULTS 50+ PO) Take 1 Tablet by mouth every day. (Patient not taking: Reported on 12/18/2023)      [DISCONTINUED] metoprolol SR (TOPROL XL) 25 MG TABLET SR 24 HR TAKE 1 TABLET BY MOUTH EVERY DAY 90 Tablet 3    [DISCONTINUED] XIIDRA 5 % Solution Take 1 Drop by mouth 2 times a day. (Patient not taking: Reported on 12/18/2023)       No facility-administered encounter medications on file as of 12/18/2023.     ROS           Objective     BP 96/70 (BP Location: Left arm, Patient Position: Sitting, BP Cuff Size: Adult)   Pulse 78   Ht 1.626 m (5' 4\")   Wt 88 kg (194 lb)   SpO2 93%   BMI 33.30 kg/m²     Physical Exam  Constitutional:       General: She is not in acute distress.     Appearance: She is not diaphoretic.   Eyes:      General: No scleral icterus.  Neck:      Vascular: No JVD.   Cardiovascular:      Rate and Rhythm: Normal rate.      Heart sounds: Normal heart sounds. No murmur heard.     No friction rub. No gallop.   Pulmonary:      Effort: No respiratory distress.      Breath sounds: No wheezing or rales.   Abdominal:      General: Bowel sounds are normal.      Palpations: Abdomen is soft.   Musculoskeletal:      Right lower leg: No edema.      Left lower leg: No edema.   Skin:     Findings: No rash.   Neurological:      Mental Status: She is alert. Mental status is at baseline. "   Psychiatric:         Mood and Affect: Mood normal.                Assessment & Plan     1. Mitral valve prolapse        2. Mild mitral regurgitation        3. Dyslipidemia        4. Inappropriate sinus tachycardia  metoprolol SR (TOPROL XL) 25 MG TABLET SR 24 HR      5. Raynaud's phenomenon without gangrene            Medical Decision Making: Today's Assessment/Status/Plan:        It was my pleasure to meet with Ms. Terry.    Blood pressure is well controlled.  She will continue to monitor and eat hearty healthy diet.    Cac O!    CAC and Echo every 5 years    I will see Ms. Terry back in 2 year time and encouraged her to follow up with us over the phone or electronically using my MyChart as issues arise.    It is my pleasure to participate in the care of Ms. Terry.  Please do not hesitate to contact me with questions or concerns.    Ben Mckoy MD PhD [facility]  Cardiologist [facility]    Please note that this dictation was created using voice recognition software. There may be errors I did not discover before finalizing the note.

## 2024-01-18 ENCOUNTER — APPOINTMENT (OUTPATIENT)
Dept: GYNECOLOGY | Facility: CLINIC | Age: 59
End: 2024-01-18
Payer: COMMERCIAL

## 2024-03-01 ENCOUNTER — HOSPITAL ENCOUNTER (OUTPATIENT)
Dept: RADIOLOGY | Facility: MEDICAL CENTER | Age: 59
End: 2024-03-01
Attending: PHYSICIAN ASSISTANT
Payer: COMMERCIAL

## 2024-03-01 ENCOUNTER — HOSPITAL ENCOUNTER (OUTPATIENT)
Dept: RADIOLOGY | Facility: MEDICAL CENTER | Age: 59
End: 2024-03-01

## 2024-03-01 DIAGNOSIS — S83.281A TEAR OF LATERAL MENISCUS OF RIGHT KNEE, CURRENT, INITIAL ENCOUNTER: ICD-10-CM

## 2024-03-01 PROCEDURE — 73721 MRI JNT OF LWR EXTRE W/O DYE: CPT | Mod: RT

## 2024-03-04 DIAGNOSIS — N95.1 MENOPAUSAL SYMPTOMS: ICD-10-CM

## 2024-03-04 RX ORDER — PROGESTERONE 200 MG/1
200 CAPSULE ORAL EVERY EVENING
Qty: 90 CAPSULE | Refills: 0 | Status: SHIPPED | OUTPATIENT
Start: 2024-03-04 | End: 2024-03-19 | Stop reason: SDUPTHER

## 2024-03-04 NOTE — TELEPHONE ENCOUNTER
Received request via: Pharmacy    Was the patient seen in the last year in this department? Yes LOV: 3/28/23    Does the patient have an active prescription (recently filled or refills available) for medication(s) requested? No    Pharmacy Name: [facility] [address]    Does the patient have assisted Plus and need 100 day supply (blood pressure, diabetes and cholesterol meds only)? Patient does not have SCP

## 2024-03-07 PROBLEM — M17.12 DEGENERATIVE ARTHRITIS OF LEFT KNEE: Status: ACTIVE | Noted: 2024-03-07

## 2024-03-19 ENCOUNTER — OFFICE VISIT (OUTPATIENT)
Dept: MEDICAL GROUP | Facility: LAB | Age: 59
End: 2024-03-19
Payer: COMMERCIAL

## 2024-03-19 VITALS
OXYGEN SATURATION: 94 % | TEMPERATURE: 98.3 F | HEIGHT: 64 IN | DIASTOLIC BLOOD PRESSURE: 64 MMHG | RESPIRATION RATE: 14 BRPM | WEIGHT: 177.4 LBS | SYSTOLIC BLOOD PRESSURE: 108 MMHG | BODY MASS INDEX: 30.29 KG/M2 | HEART RATE: 72 BPM

## 2024-03-19 DIAGNOSIS — Z00.00 WELLNESS EXAMINATION: ICD-10-CM

## 2024-03-19 DIAGNOSIS — Z12.83 SCREENING FOR SKIN CANCER: ICD-10-CM

## 2024-03-19 DIAGNOSIS — N95.2 ATROPHIC VAGINITIS: ICD-10-CM

## 2024-03-19 DIAGNOSIS — F41.9 ANXIETY: ICD-10-CM

## 2024-03-19 DIAGNOSIS — R73.03 PREDIABETES: ICD-10-CM

## 2024-03-19 DIAGNOSIS — E55.9 VITAMIN D DEFICIENCY: ICD-10-CM

## 2024-03-19 DIAGNOSIS — E66.9 OBESITY (BMI 30-39.9): ICD-10-CM

## 2024-03-19 DIAGNOSIS — N95.1 MENOPAUSAL SYMPTOMS: ICD-10-CM

## 2024-03-19 DIAGNOSIS — E78.5 DYSLIPIDEMIA: ICD-10-CM

## 2024-03-19 DIAGNOSIS — Z12.31 ENCOUNTER FOR SCREENING MAMMOGRAM FOR MALIGNANT NEOPLASM OF BREAST: ICD-10-CM

## 2024-03-19 PROCEDURE — 3074F SYST BP LT 130 MM HG: CPT | Performed by: NURSE PRACTITIONER

## 2024-03-19 PROCEDURE — 99396 PREV VISIT EST AGE 40-64: CPT | Performed by: NURSE PRACTITIONER

## 2024-03-19 PROCEDURE — 3078F DIAST BP <80 MM HG: CPT | Performed by: NURSE PRACTITIONER

## 2024-03-19 RX ORDER — ESTRADIOL 0.1 MG/G
CREAM VAGINAL
Qty: 1 EACH | Refills: 3 | Status: SHIPPED | OUTPATIENT
Start: 2024-03-19

## 2024-03-19 RX ORDER — ESTRADIOL 0.07 MG/D
1 PATCH TRANSDERMAL
Qty: 12 PATCH | Refills: 3 | Status: SHIPPED | OUTPATIENT
Start: 2024-03-19

## 2024-03-19 RX ORDER — PROGESTERONE 200 MG/1
200 CAPSULE ORAL EVERY EVENING
Qty: 90 CAPSULE | Refills: 3 | Status: SHIPPED | OUTPATIENT
Start: 2024-03-19

## 2024-03-19 ASSESSMENT — FIBROSIS 4 INDEX: FIB4 SCORE: 0.59

## 2024-03-19 ASSESSMENT — PATIENT HEALTH QUESTIONNAIRE - PHQ9: CLINICAL INTERPRETATION OF PHQ2 SCORE: 0

## 2024-03-19 NOTE — ASSESSMENT & PLAN NOTE
"At visit height 5'4\" and weight 177 pounds yielding BMI 30.45.  Has been taking Zepbound, but insurance will now cover Wegovy and she would like to switch medications. She has lost over 20 pounds since starting the medication in January.  Educated on normal BMI.    Educated on lifestyle changes/portion control/plant based diet/adequate hydration.  May be beneficial to use food tracker ronan/food journal.  Consider dietician evaluation/weight management program. Patient agrees that dietician/weight management program may be beneficial.  Comorbid conditions: prediabetes  "

## 2024-03-19 NOTE — ASSESSMENT & PLAN NOTE
A1c history:   Lab Results   Component Value Date/Time    HBA1C 5.8 (H) 10/03/2023 02:09 PM    HBA1C 6.0 (H) 03/28/2023 08:13 AM    HBA1C 5.7 (H) 03/01/2022 08:59 AM     Currently working on lifestyle modifications including diet and exercise. Denies any unexplained weight loss, polyuria, polydipsia.

## 2024-03-19 NOTE — PROGRESS NOTES
"Subjective:     CC:   Chief Complaint   Patient presents with    Annual Wellness Visit     HPI:   [place] presents today with the following:    Wellness Examination     - Dyslipidemia (30-45): due  - Diabetes (HTN, HLD, BMI >25): due  - Depression screening (PHQ-2 and/or PHQ-9): 0  - Osteoporosis: Ca intake, DEXA (>65 or with risk factors): n/a  - If fx, needs BMD test or tx w/i 6 months of dx  - Dental: needs to establish with dentist  - Eye: sees optometrist regularly  AAA Screening (Once in men 65-75 years who have ever smoked): n/a  Diet: balanced  Exercise: regular  Substance Use: none  Tobacco Use/counseling: n/a  Safe in relationship: yes  Seat belts, bike helmet, gun safety discussed.  Sun protection used.     Cancer screening  - Colon CA (45-75) - FIT (annual) cspy (q10yr): 2023  - Lung CA (50-79y/o w/20py smoking hx & currently smoke or quit w/i past 15 yrs): n/a  - Prostate Cancer Screening/PSA: n/a  - Cervical CA (21-65): 5/2022  -  HX Abnormal pap/HPV: none  - Breast CA: mammo (required 50-73yo) or starting 40 (ACOG, ACR), 45 (ACS), 50 (USPTF): due 4/2024  - Skin cancer screening: needs a referral to dermatology     Infectious disease screening/Immunizations  --STI/HIV Screening: none  --Practices safe sex.  --Hepatitis C Screening (18 to 78 yo): n/a  --Immunizations:               Influenza: UTD   Covid-19: UTD              Tetanus: UTD              Shingles: UTD    Obesity (BMI 30-39.9)  At visit height 5'4\" and weight 177 pounds yielding BMI 30.45.  Has been taking Zepbound, but insurance will now cover Wegovy and she would like to switch medications. She has lost over 20 pounds since starting the medication in January.  Educated on normal BMI.    Educated on lifestyle changes/portion control/plant based diet/adequate hydration.  May be beneficial to use food tracker ronan/food journal.  Consider dietician evaluation/weight management program. Patient agrees that dietician/weight management program " "may be beneficial.  Comorbid conditions: prediabetes    Prediabetes  A1c history:   Lab Results   Component Value Date/Time    HBA1C 5.8 (H) 10/03/2023 02:09 PM    HBA1C 6.0 (H) 03/28/2023 08:13 AM    HBA1C 5.7 (H) 03/01/2022 08:59 AM     Currently working on lifestyle modifications including diet and exercise. Denies any unexplained weight loss, polyuria, polydipsia.     ROS:   Gen: no fevers/chills, no changes in weight  Eyes: no changes in vision  ENT: no sore throat, no hearing loss, no bloody nose  Pulm: no sob, no cough  CV: no chest pain, no palpitations  GI: no nausea/vomiting, no diarrhea  : no dysuria  MSk: no myalgias  Skin: no rash  Neuro: no headaches, no numbness/tingling  Heme/Lymph: no easy bruising        - NOTE: All other systems reviewed and are negative, except as in HPI.    Objective:     Exam: /64 (BP Location: Right arm, Patient Position: Sitting, BP Cuff Size: Adult)   Pulse 72   Temp 36.8 °C (98.3 °F)   Resp 14   Ht 1.626 m (5' 4\")   Wt 80.5 kg (177 lb 6.4 oz)   SpO2 94%  Body mass index is 30.45 kg/m².    General: Normal appearing. No distress.  HEENT: Normocephalic. Eyes conjunctiva clear lids without ptosis, pupils equal and reactive to light accommodation, ears normal shape and contour, canals are clear bilaterally, tympanic membranes are benign, nasal mucosa benign, oropharynx is without erythema, edema or exudates.   Neck: Supple without JVD or bruit. Thyroid is not enlarged.  Pulmonary: Clear to ausculation.  Normal effort. No rales, ronchi, or wheezing.  Cardiovascular: Regular rate and rhythm without murmur. Carotid and radial pulses are intact and equal bilaterally.  Neurologic: Grossly nonfocal  Lymph: No cervical or supraclavicular lymph nodes are palpable  Skin: Warm and dry.  No obvious lesions.  Musculoskeletal: Normal gait. No extremity cyanosis, clubbing, or edema.  Psych: Normal mood and affect. Alert and oriented x3. Judgment and insight is " normal.    Assessment & Plan:     58 y.o. female with the following -     1. Wellness examination  This is a 58-year-old female here today for a preventative exam.  Previous medical history, healthcare maintenance and immunization status reviewed.  Patient is up to date.  Annual labwork ordered.  See discussion of anticipatory guidance below.  Patient will return annually for preventative exams.    Dentist and eye doctor   Labs per orders.    Anticipatory guidance  Counseling about diet, supplements, exercise, skin care and safe sex.    - CBC WITH DIFFERENTIAL; Future  - Comp Metabolic Panel; Future  - Lipid Profile; Future  - HEMOGLOBIN A1C; Future  - TSH WITH REFLEX TO FT4; Future  - VITAMIN D,25 HYDROXY (DEFICIENCY); Future    2. Obesity (BMI 30-39.9)  Chronic condition. BMI was assessed today and reviewed with patient as meeting criteria for the risk factor obesity.  Willingness to change as well as barriers were assessed. A collaborative plan was made with the patient and goals were set. Assistance was discussed, including self-help and more formal medical adjunctive treatments. Patient to take medication as prescribed. Side effects of medication prescribed today were discussed with the patient including how to take the medication and proper dosage. Discussed repercussions of not taking the medication as prescribed. Instructed to call the office should she have any negative side effects or problems with the medication. Recommend healthy low-carb diet, 30-minutes of moderate exercise daily and avoiding sugars and high-fat foods.    - Semaglutide (WEGOVY) 0.25 MG/0.5ML Solution Auto-injector Pen-injector; Inject 0.5 mL under the skin every 7 days.  Dispense: 3 mL; Refill: 1    3. Prediabetes  Chronic condition. Patient to take medication as prescribed. Side effects of medication prescribed today were discussed with the patient including how to take the medication and proper dosage. Discussed repercussions of not  taking the medication as prescribed. Instructed to call the office should she have any negative side effects or problems with the medication. Advised to reduce sugar/carbohydrate/alcohol, eat more vegetables and lean meats such as fish/chicken/turkey. Recommend 30 minutes of cardiovascular exercise most days of the week.   - Comp Metabolic Panel; Future  - HEMOGLOBIN A1C; Future  - Semaglutide (WEGOVY) 0.25 MG/0.5ML Solution Auto-injector Pen-injector; Inject 0.5 mL under the skin every 7 days.  Dispense: 3 mL; Refill: 1    4. Vitamin D deficiency  Labs ordered.   - VITAMIN D,25 HYDROXY (DEFICIENCY); Future    5. Dyslipidemia  Chronic condition. Labs as indicated.  Continue lifestyle modifications.  - Comp Metabolic Panel; Future  - Lipid Profile; Future    6. Anxiety  Chronic condition. Labs as indicated.  Denies any suicidal or homicidal ideation. Discussed that should the patient have any symptoms they should call suicide prevention hotline or report to the emergency room immediately. Emphasized importance of healthy diet and exercise.    - CBC WITH DIFFERENTIAL; Future  - TSH WITH REFLEX TO FT4; Future    7. Menopausal symptoms  Chronic, stable condition. Medication refilled.   - estradiol (CLIMARA) 0.075 MG/24HR PATCH WEEKLY; Place 1 Patch on the skin every 7 days.  Dispense: 12 Patch; Refill: 3  - progesterone (PROMETRIUM) 200 MG capsule; Take 1 Capsule by mouth every evening.  Dispense: 90 Capsule; Refill: 3    8. Atrophic vaginitis  Chronic, stable condition. Medication refilled.   - estradiol (ESTRACE VAGINAL) 0.1 MG/GM vaginal cream; Apply 1g cream inside vagina twice per week  Dispense: 1 Each; Refill: 3    9. Encounter for screening mammogram for malignant neoplasm of breast  Mammogram ordered.   - MA-SCREENING MAMMO BILAT W/TOMOSYNTHESIS W/CAD; Future    10. Screening for skin cancer  Referral placed to dermatology.  - Referral to Dermatology

## 2024-03-26 ENCOUNTER — HOSPITAL ENCOUNTER (OUTPATIENT)
Dept: RADIOLOGY | Facility: MEDICAL CENTER | Age: 59
End: 2024-03-26
Attending: ORTHOPAEDIC SURGERY
Payer: COMMERCIAL

## 2024-03-26 ENCOUNTER — APPOINTMENT (OUTPATIENT)
Dept: MEDICAL GROUP | Facility: LAB | Age: 59
End: 2024-03-26
Payer: COMMERCIAL

## 2024-03-26 DIAGNOSIS — M17.12 PRIMARY OSTEOARTHRITIS OF LEFT KNEE: ICD-10-CM

## 2024-03-26 PROCEDURE — 73700 CT LOWER EXTREMITY W/O DYE: CPT | Mod: LT

## 2024-04-02 ENCOUNTER — HOSPITAL ENCOUNTER (OUTPATIENT)
Dept: LAB | Facility: MEDICAL CENTER | Age: 59
End: 2024-04-02
Attending: NURSE PRACTITIONER
Payer: COMMERCIAL

## 2024-04-02 DIAGNOSIS — R73.03 PREDIABETES: ICD-10-CM

## 2024-04-02 DIAGNOSIS — Z00.00 WELLNESS EXAMINATION: ICD-10-CM

## 2024-04-02 DIAGNOSIS — E55.9 VITAMIN D DEFICIENCY: ICD-10-CM

## 2024-04-02 DIAGNOSIS — E78.5 DYSLIPIDEMIA: ICD-10-CM

## 2024-04-02 DIAGNOSIS — F41.9 ANXIETY: ICD-10-CM

## 2024-04-02 LAB
25(OH)D3 SERPL-MCNC: 20 NG/ML (ref 30–100)
ALBUMIN SERPL BCP-MCNC: 4.2 G/DL (ref 3.2–4.9)
ALBUMIN/GLOB SERPL: 1.4 G/DL
ALP SERPL-CCNC: 93 U/L (ref 30–99)
ALT SERPL-CCNC: 11 U/L (ref 2–50)
ANION GAP SERPL CALC-SCNC: 17 MMOL/L (ref 7–16)
AST SERPL-CCNC: 18 U/L (ref 12–45)
BASOPHILS # BLD AUTO: 0.5 % (ref 0–1.8)
BASOPHILS # BLD: 0.04 K/UL (ref 0–0.12)
BILIRUB SERPL-MCNC: 0.2 MG/DL (ref 0.1–1.5)
BUN SERPL-MCNC: 10 MG/DL (ref 8–22)
CALCIUM ALBUM COR SERPL-MCNC: 9.5 MG/DL (ref 8.5–10.5)
CALCIUM SERPL-MCNC: 9.7 MG/DL (ref 8.5–10.5)
CHLORIDE SERPL-SCNC: 101 MMOL/L (ref 96–112)
CHOLEST SERPL-MCNC: 220 MG/DL (ref 100–199)
CO2 SERPL-SCNC: 20 MMOL/L (ref 20–33)
CREAT SERPL-MCNC: 0.6 MG/DL (ref 0.5–1.4)
EOSINOPHIL # BLD AUTO: 0.13 K/UL (ref 0–0.51)
EOSINOPHIL NFR BLD: 1.6 % (ref 0–6.9)
ERYTHROCYTE [DISTWIDTH] IN BLOOD BY AUTOMATED COUNT: 51.5 FL (ref 35.9–50)
EST. AVERAGE GLUCOSE BLD GHB EST-MCNC: 120 MG/DL
FASTING STATUS PATIENT QL REPORTED: NORMAL
GFR SERPLBLD CREATININE-BSD FMLA CKD-EPI: 104 ML/MIN/1.73 M 2
GLOBULIN SER CALC-MCNC: 2.9 G/DL (ref 1.9–3.5)
GLUCOSE SERPL-MCNC: 89 MG/DL (ref 65–99)
HBA1C MFR BLD: 5.8 % (ref 4–5.6)
HCT VFR BLD AUTO: 43.2 % (ref 37–47)
HDLC SERPL-MCNC: 40 MG/DL
HGB BLD-MCNC: 14.8 G/DL (ref 12–16)
IMM GRANULOCYTES # BLD AUTO: 0.06 K/UL (ref 0–0.11)
IMM GRANULOCYTES NFR BLD AUTO: 0.7 % (ref 0–0.9)
LDLC SERPL CALC-MCNC: 161 MG/DL
LYMPHOCYTES # BLD AUTO: 2.34 K/UL (ref 1–4.8)
LYMPHOCYTES NFR BLD: 28.9 % (ref 22–41)
MCH RBC QN AUTO: 31.8 PG (ref 27–33)
MCHC RBC AUTO-ENTMCNC: 34.3 G/DL (ref 32.2–35.5)
MCV RBC AUTO: 92.9 FL (ref 81.4–97.8)
MONOCYTES # BLD AUTO: 0.62 K/UL (ref 0–0.85)
MONOCYTES NFR BLD AUTO: 7.7 % (ref 0–13.4)
NEUTROPHILS # BLD AUTO: 4.9 K/UL (ref 1.82–7.42)
NEUTROPHILS NFR BLD: 60.6 % (ref 44–72)
NRBC # BLD AUTO: 0 K/UL
NRBC BLD-RTO: 0 /100 WBC (ref 0–0.2)
PLATELET # BLD AUTO: 323 K/UL (ref 164–446)
PMV BLD AUTO: 9.8 FL (ref 9–12.9)
POTASSIUM SERPL-SCNC: 4.4 MMOL/L (ref 3.6–5.5)
PROT SERPL-MCNC: 7.1 G/DL (ref 6–8.2)
RBC # BLD AUTO: 4.65 M/UL (ref 4.2–5.4)
SODIUM SERPL-SCNC: 138 MMOL/L (ref 135–145)
TRIGL SERPL-MCNC: 96 MG/DL (ref 0–149)
TSH SERPL DL<=0.005 MIU/L-ACNC: 2.15 UIU/ML (ref 0.38–5.33)
WBC # BLD AUTO: 8.1 K/UL (ref 4.8–10.8)

## 2024-04-02 PROCEDURE — 80061 LIPID PANEL: CPT

## 2024-04-02 PROCEDURE — 82306 VITAMIN D 25 HYDROXY: CPT

## 2024-04-02 PROCEDURE — 80053 COMPREHEN METABOLIC PANEL: CPT

## 2024-04-02 PROCEDURE — 83036 HEMOGLOBIN GLYCOSYLATED A1C: CPT

## 2024-04-02 PROCEDURE — 84443 ASSAY THYROID STIM HORMONE: CPT

## 2024-04-02 PROCEDURE — 36415 COLL VENOUS BLD VENIPUNCTURE: CPT

## 2024-04-02 PROCEDURE — 85025 COMPLETE CBC W/AUTO DIFF WBC: CPT

## 2024-04-24 ENCOUNTER — OFFICE VISIT (OUTPATIENT)
Dept: URGENT CARE | Facility: PHYSICIAN GROUP | Age: 59
End: 2024-04-24
Payer: COMMERCIAL

## 2024-04-24 ENCOUNTER — HOSPITAL ENCOUNTER (OUTPATIENT)
Dept: RADIOLOGY | Facility: MEDICAL CENTER | Age: 59
End: 2024-04-24
Payer: COMMERCIAL

## 2024-04-24 VITALS
SYSTOLIC BLOOD PRESSURE: 102 MMHG | WEIGHT: 174 LBS | RESPIRATION RATE: 16 BRPM | HEART RATE: 88 BPM | DIASTOLIC BLOOD PRESSURE: 68 MMHG | TEMPERATURE: 98.4 F | OXYGEN SATURATION: 96 % | BODY MASS INDEX: 29.87 KG/M2

## 2024-04-24 DIAGNOSIS — N95.8 SIMPLE ADNEXAL CYST GREATER THAN 1 CM IN DIAMETER IN POSTMENOPAUSAL PATIENT: ICD-10-CM

## 2024-04-24 DIAGNOSIS — R10.2 SUPRAPUBIC ABDOMINAL PAIN: ICD-10-CM

## 2024-04-24 DIAGNOSIS — R19.7 DIARRHEA, UNSPECIFIED TYPE: ICD-10-CM

## 2024-04-24 DIAGNOSIS — N94.89 SIMPLE ADNEXAL CYST GREATER THAN 1 CM IN DIAMETER IN POSTMENOPAUSAL PATIENT: ICD-10-CM

## 2024-04-24 DIAGNOSIS — K57.92 DIVERTICULITIS: ICD-10-CM

## 2024-04-24 LAB
APPEARANCE UR: CLEAR
BILIRUB UR STRIP-MCNC: NEGATIVE MG/DL
COLOR UR AUTO: YELLOW
GLUCOSE UR STRIP.AUTO-MCNC: NEGATIVE MG/DL
KETONES UR STRIP.AUTO-MCNC: 15 MG/DL
LEUKOCYTE ESTERASE UR QL STRIP.AUTO: NEGATIVE
NITRITE UR QL STRIP.AUTO: NEGATIVE
PH UR STRIP.AUTO: 7 [PH] (ref 5–8)
PROT UR QL STRIP: NEGATIVE MG/DL
RBC UR QL AUTO: NEGATIVE
SP GR UR STRIP.AUTO: 1.01
UROBILINOGEN UR STRIP-MCNC: 0.2 MG/DL

## 2024-04-24 PROCEDURE — 3074F SYST BP LT 130 MM HG: CPT

## 2024-04-24 PROCEDURE — 81002 URINALYSIS NONAUTO W/O SCOPE: CPT

## 2024-04-24 PROCEDURE — 3078F DIAST BP <80 MM HG: CPT

## 2024-04-24 PROCEDURE — 700117 HCHG RX CONTRAST REV CODE 255

## 2024-04-24 PROCEDURE — 99214 OFFICE O/P EST MOD 30 MIN: CPT

## 2024-04-24 PROCEDURE — 74177 CT ABD & PELVIS W/CONTRAST: CPT

## 2024-04-24 RX ORDER — AMOXICILLIN AND CLAVULANATE POTASSIUM 875; 125 MG/1; MG/1
1 TABLET, FILM COATED ORAL 2 TIMES DAILY
Qty: 20 TABLET | Refills: 0 | Status: SHIPPED | OUTPATIENT
Start: 2024-04-24 | End: 2024-05-04

## 2024-04-24 RX ADMIN — IOHEXOL 25 ML: 240 INJECTION, SOLUTION INTRATHECAL; INTRAVASCULAR; INTRAVENOUS; ORAL at 17:15

## 2024-04-24 RX ADMIN — IOHEXOL 100 ML: 350 INJECTION, SOLUTION INTRAVENOUS at 17:15

## 2024-04-24 ASSESSMENT — FIBROSIS 4 INDEX: FIB4 SCORE: 0.96

## 2024-04-24 ASSESSMENT — ENCOUNTER SYMPTOMS
ABDOMINAL PAIN: 1
BLOOD IN STOOL: 0
VOMITING: 0
FEVER: 0
DIARRHEA: 1
SHORTNESS OF BREATH: 0
DIZZINESS: 1
NAUSEA: 0

## 2024-04-24 NOTE — PROGRESS NOTES
Subjective:     CHIEF COMPLAINT  Chief Complaint   Patient presents with    Diarrhea     X 1 week with lower abdominal pain       HPI  Nel Terry is a very pleasant 58 y.o. female who presents with suprapubic abdominal pain and cramping as well as diarrhea for the past week.  She reports she has also been feeling dizzy for approximately 3 weeks.  She has not seen any blood in her stool, but does report a relatively large amount of mucus in her stool.  She tried taking Imodium with some improvement in symptoms.  She denies any recent antibiotic use.  She has been taking probiotics.  She has not had any fevers, nausea, or vomiting.  She denies any UTI symptoms such as burning with urination.  She does report a history of diverticulitis with her most recent flare in November 2023.    REVIEW OF SYSTEMS  Review of Systems   Constitutional:  Negative for fever.   Respiratory:  Negative for shortness of breath.    Cardiovascular:  Negative for chest pain.   Gastrointestinal:  Positive for abdominal pain (Suprapubic) and diarrhea. Negative for blood in stool, melena, nausea and vomiting.   Genitourinary:  Negative for dysuria, frequency and urgency.   Neurological:  Positive for dizziness.       PAST MEDICAL HISTORY  Patient Active Problem List    Diagnosis Date Noted    Degenerative arthritis of left knee 03/07/2024    Post-menopausal bleeding 06/02/2023    Endocervical polyp 06/02/2023    OAB (overactive bladder) 06/01/2023    Urge urinary incontinence 06/01/2023    History of suburethral sling procedure 06/01/2023    Atrophic vaginitis 06/01/2023    Prediabetes 03/28/2023    Dyslipidemia 03/28/2023    Mitral valve prolapse 03/16/2023    Mild mitral regurgitation 03/16/2023    Medial meniscus tear 02/27/2023    Primary osteoarthritis of first carpometacarpal joint of right hand 02/13/2023    History of diverticulitis 03/01/2022    Hormone replacement therapy 03/16/2021    Inappropriate sinus tachycardia (HCC)  01/31/2021    Anxiety 11/19/2019    Vitamin D deficiency 06/14/2019    Menopausal symptoms 06/14/2019    Antinuclear factor positive 10/19/2017    Raynaud's phenomenon without gangrene 10/19/2017    Obesity (BMI 30-39.9) 09/19/2014       SURGICAL HISTORY   has a past surgical history that includes gyn surgery; other orthopedic surgery; other; sinusotomies; knee scope,med/lat menisectomy (Left, 4/6/2023); hysteroscopy with myosure (6/2/2023); percut implant,neuroelec,sacral nerve (10/9/2023); ins/rpl prph sac/gstr npg/r (N/A, 10/25/2023); repair intercarp/carp-metacarp jt (Right, 4/11/2024); and transplant forearm/wrist tendon (Right, 4/11/2024).    ALLERGIES  Allergies   Allergen Reactions    Hydrocodone-Acetaminophen      Headache       CURRENT MEDICATIONS  Home Medications       Reviewed by Fernanda Cedillo P.A.-CBerenice (Physician Assistant) on 04/24/24 at 1451  Med List Status: <None>     Medication Last Dose Status   diphenhydrAMINE (BENADRYL) 25 MG Tab PRN Active   estradiol (CLIMARA) 0.075 MG/24HR PATCH WEEKLY Taking Active   estradiol (ESTRACE VAGINAL) 0.1 MG/GM vaginal cream Taking Active   meloxicam (MOBIC) 15 MG tablet Taking Active   metoprolol SR (TOPROL XL) 25 MG TABLET SR 24 HR Taking Active   progesterone (PROMETRIUM) 200 MG capsule Taking Active   Semaglutide (WEGOVY) 0.25 MG/0.5ML Solution Auto-injector Pen-injector  Active                    SOCIAL HISTORY  Social History     Tobacco Use    Smoking status: Former     Average packs/day: (0.3 ttl pk-yrs)     Types: Cigarettes     Start date: 2003     Passive exposure: Never    Smokeless tobacco: Never   Vaping Use    Vaping Use: Never used   Substance and Sexual Activity    Alcohol use: Yes     Alcohol/week: 0.6 oz     Types: 1 Standard drinks or equivalent per week     Comment: occ    Drug use: Not Currently    Sexual activity: Not Currently     Partners: Male     Birth control/protection: Post-Menopausal       FAMILY HISTORY  Family History    Problem Relation Age of Onset    Heart Disease Mother         58    Hypertension Mother     Hyperlipidemia Mother     Breast Cancer Maternal Aunt 30    Stomach Cancer Maternal Aunt     Cancer Paternal Grandmother     Breast Cancer Paternal Grandmother           Objective:     VITAL SIGNS: /68 (BP Location: Left arm, Patient Position: Sitting, BP Cuff Size: Adult)   Pulse 88   Temp 36.9 °C (98.4 °F) (Temporal)   Resp 16   Wt 78.9 kg (174 lb)   SpO2 96%   BMI 29.87 kg/m²     PHYSICAL EXAM  Physical Exam  Vitals reviewed.   Constitutional:       General: She is not in acute distress.     Appearance: Normal appearance. She is not ill-appearing, toxic-appearing or diaphoretic.   HENT:      Head: Normocephalic and atraumatic.      Mouth/Throat:      Mouth: Mucous membranes are moist.   Eyes:      Conjunctiva/sclera: Conjunctivae normal.   Cardiovascular:      Rate and Rhythm: Normal rate and regular rhythm.      Heart sounds: Normal heart sounds.   Pulmonary:      Effort: Pulmonary effort is normal. No respiratory distress.      Breath sounds: Normal breath sounds. No stridor. No wheezing, rhonchi or rales.   Abdominal:      General: Abdomen is flat. Bowel sounds are normal. There is no distension.      Palpations: Abdomen is soft. There is no mass.      Tenderness: There is abdominal tenderness in the right lower quadrant, epigastric area, suprapubic area, left upper quadrant and left lower quadrant. There is no right CVA tenderness, left CVA tenderness, guarding or rebound. Negative signs include Cummins's sign and McBurney's sign.      Hernia: No hernia is present.   Skin:     General: Skin is warm and dry.      Coloration: Skin is not jaundiced or pale.   Neurological:      General: No focal deficit present.      Mental Status: She is alert.   Psychiatric:         Mood and Affect: Mood normal.         Assessment/Plan:     1. Diverticulitis  - POCT Urinalysis  - CT-ABDOMEN-PELVIS WITH; Future  -  amoxicillin-clavulanate (AUGMENTIN) 875-125 MG Tab; Take 1 Tablet by mouth 2 times a day for 10 days.  Dispense: 20 Tablet; Refill: 0    2. Diarrhea, unspecified type    3. Simple adnexal cyst greater than 1 cm in diameter in postmenopausal patient  - Referral to Gynecology  -Be seen in emergency department if symptoms acutely worsen  -Return to clinic as needed  -Maintain adequate hydration  -Primarily liquid diet for 48 hours with return to normal diet gradually  -Follow-up with gynecology for ultrasound to evaluate ovarian cyst  -Follow-up with primary care provider    Results:  CT-ABDOMEN-PELVIS WITH  Narrative: 4/24/2024 4:44 PM    HISTORY/REASON FOR EXAM:  Abdominal pain. Hx of diverticulitis.  Generalized abdominal pain    TECHNIQUE/EXAM DESCRIPTION:   CT scan of the abdomen and pelvis with contrast.    Contrast-enhanced helical scanning was obtained from the diaphragmatic domes through the pubic symphysis following the bolus administration of nonionic contrast without complication.    100 mL of Omnipaque 350 nonionic contrast was administered without complication.    Low dose optimization technique was utilized for this CT exam including automated exposure control and adjustment of the mA and/or kV according to patient size.    COMPARISON: CT abdomen and pelvis 11/14/2023    FINDINGS:  LUNGS: Show mild atelectasis.    OSSEOUS STRUCTURES:  There is scoliosis and there is facet arthropathy of the lumbar spine.    ABDOMEN:    LIVER: Normal in appearance.    GALLBLADDER and BILIARY SYSTEM The gallbladder is normal in CT appearance. There is no biliary dilation.    SPLEEN: Normal in appearance.    PANCREAS: The pancreas is normal in appearance.    The splenic vein and portal vein enhance normally.    ADRENAL glands: Normal in appearance.    RIGHT kidney: Normal in appearance.    LEFT kidney: Normal in appearance.    There is no hydronephrosis.    BOWEL and MESENTERY: There is increased in expected amount of stool  throughout the colon. There is mild inflammatory change adjacent to the junction of the descending and sigmoid colon. This finding is most consistent with diverticulitis.    AORTA and VASCULATURE: Normal in appearance.    PELVIS:    Uterus and adnexa appear normal. There is a 2.2 cm cyst within the right ovary. This is a low suspicion but abnormal finding in this age group.  Impression: 1.  Diverticulitis at the junction of the descending and sigmoid colon    2.  No abscess    3.  Increase in expected amount of stool throughout the colon    4.  2.2 cm right adnexal cyst, low suspicion but abnormal finding in this age group. Sonographic follow-up suggested      MDM/Comments:  I have prepared for this visit by personally reviewing the patient's prevous medical records, vitals, and labs including: most recent GFR and CMP. Most recent CT abdomen results from 11/2023 reviewed showing diverticulosis with acute diverticulitis. Patient has stable vital signs and is non-toxic appearing.  Urinalysis obtained in office showing small ketones.  CT abdomen performed showing diverticulitis in addition to a 2.2 cm cyst in the right ovary.  A referral has been placed to gynecology for sonographic follow-up.  Patient initiated on Augmentin for treatment of diverticulitis.  Discussed diverticulitis diet and ingesting a primarily liquid diet for the next 48 hours with gradual return to normal diet as tolerated.  Strict ER precautions discussed with patient if her symptoms worsen.  Discussed supportive care with hydration, rest, Tylenol/Ibuprofen as needed. Patient demonstrated understanding of treatment plan at this time and will RTC/ER if symptoms worsen or fail to resolve.       Differential diagnosis, natural history, supportive care, and indications for immediate follow-up discussed. All questions answered. Patient agrees with the plan of care.    Follow-up as needed if symptoms worsen or fail to improve to PCP, Urgent care or  Emergency Room.    I have personally reviewed prior external notes and test results pertinent to today's visit.  I have independently reviewed and interpreted all diagnostics ordered during this urgent care acute visit.   Discussed management options (risks,benefits, and alternatives to treatment). Pt expresses understanding and the treatment plan was agreed upon. Questions were encouraged and answered to pt's satisfaction.    Please note that this dictation was created using voice recognition software. I have made a reasonable attempt to correct obvious errors, but I expect that there are errors of grammar and possibly content that I did not discover before finalizing the note.

## 2024-04-26 ENCOUNTER — HOSPITAL ENCOUNTER (OUTPATIENT)
Dept: RADIOLOGY | Facility: MEDICAL CENTER | Age: 59
End: 2024-04-26
Attending: NURSE PRACTITIONER
Payer: COMMERCIAL

## 2024-04-26 DIAGNOSIS — Z12.31 ENCOUNTER FOR SCREENING MAMMOGRAM FOR MALIGNANT NEOPLASM OF BREAST: ICD-10-CM

## 2024-04-26 PROCEDURE — 77067 SCR MAMMO BI INCL CAD: CPT

## 2024-04-29 DIAGNOSIS — R92.8 ABNORMAL MAMMOGRAM: ICD-10-CM

## 2024-05-07 ENCOUNTER — APPOINTMENT (OUTPATIENT)
Dept: RADIOLOGY | Facility: MEDICAL CENTER | Age: 59
End: 2024-05-07
Attending: NURSE PRACTITIONER
Payer: COMMERCIAL

## 2024-05-21 ENCOUNTER — HOSPITAL ENCOUNTER (OUTPATIENT)
Dept: RADIOLOGY | Facility: MEDICAL CENTER | Age: 59
End: 2024-05-21
Attending: NURSE PRACTITIONER
Payer: COMMERCIAL

## 2024-05-21 DIAGNOSIS — R92.8 ABNORMAL MAMMOGRAM: ICD-10-CM

## 2024-06-08 DIAGNOSIS — R73.03 PREDIABETES: ICD-10-CM

## 2024-06-08 DIAGNOSIS — E66.9 OBESITY (BMI 30-39.9): ICD-10-CM

## 2024-06-10 NOTE — TELEPHONE ENCOUNTER
Received request via: Pharmacy    Was the patient seen in the last year in this department? Yes  LOV : 3/19/2024   Does the patient have an active prescription (recently filled or refills available) for medication(s) requested? No    Pharmacy Name: [facility]     Does the patient have residential Plus and need 100 day supply (blood pressure, diabetes and cholesterol meds only)? Patient does not have SCP

## 2024-06-22 DIAGNOSIS — N95.1 MENOPAUSAL SYMPTOMS: ICD-10-CM

## 2024-06-24 RX ORDER — PROGESTERONE 200 MG/1
200 CAPSULE ORAL EVERY EVENING
Qty: 90 CAPSULE | Refills: 0 | Status: SHIPPED | OUTPATIENT
Start: 2024-06-24

## 2024-06-24 NOTE — TELEPHONE ENCOUNTER
Received request via: Pharmacy    Was the patient seen in the last year in this department? Yes  LOV : 3/19/2024   Does the patient have an active prescription (recently filled or refills available) for medication(s) requested? No    Pharmacy Name: [facility]     Does the patient have jail Plus and need 100 day supply (blood pressure, diabetes and cholesterol meds only)? Patient does not have SCP

## 2024-07-16 PROBLEM — M65.321 TRIGGER FINGER, RIGHT INDEX FINGER: Status: ACTIVE | Noted: 2024-07-16

## 2024-08-04 DIAGNOSIS — R73.03 PREDIABETES: ICD-10-CM

## 2024-08-04 DIAGNOSIS — E66.9 OBESITY (BMI 30-39.9): ICD-10-CM

## 2024-08-05 NOTE — TELEPHONE ENCOUNTER
Received request via: Pharmacy    Was the patient seen in the last year in this department? Yes  LOV : 3/19/2024   Does the patient have an active prescription (recently filled or refills available) for medication(s) requested? No    Pharmacy Name: [facility]     Does the patient have retirement Plus and need 100 day supply (blood pressure, diabetes and cholesterol meds only)? Patient does not have SCP

## 2024-09-16 PROBLEM — M72.0 DUPUYTREN'S CONTRACTURE OF RIGHT HAND: Status: ACTIVE | Noted: 2024-09-16

## 2024-09-16 PROBLEM — M65.331 TRIGGER FINGER, RIGHT MIDDLE FINGER: Status: ACTIVE | Noted: 2024-09-16

## 2024-10-07 RX ORDER — ESTRADIOL 0.07 MG/D
PATCH TRANSDERMAL
Qty: 12 PATCH | Refills: 3 | Status: SHIPPED | OUTPATIENT
Start: 2024-10-07

## 2024-10-21 DIAGNOSIS — N95.1 MENOPAUSAL SYMPTOMS: ICD-10-CM

## 2024-10-21 RX ORDER — PROGESTERONE 200 MG/1
200 CAPSULE ORAL EVERY EVENING
Qty: 90 CAPSULE | Refills: 3 | Status: SHIPPED | OUTPATIENT
Start: 2024-10-21

## 2024-11-04 ENCOUNTER — APPOINTMENT (OUTPATIENT)
Dept: MEDICAL GROUP | Facility: LAB | Age: 59
End: 2024-11-04
Payer: COMMERCIAL

## 2024-11-04 VITALS
RESPIRATION RATE: 14 BRPM | WEIGHT: 167.2 LBS | HEART RATE: 90 BPM | TEMPERATURE: 99.1 F | BODY MASS INDEX: 28.54 KG/M2 | OXYGEN SATURATION: 97 % | DIASTOLIC BLOOD PRESSURE: 64 MMHG | SYSTOLIC BLOOD PRESSURE: 108 MMHG | HEIGHT: 64 IN

## 2024-11-04 DIAGNOSIS — R21 RASH AND NONSPECIFIC SKIN ERUPTION: ICD-10-CM

## 2024-11-04 DIAGNOSIS — R73.03 PREDIABETES: ICD-10-CM

## 2024-11-04 DIAGNOSIS — E66.9 OBESITY (BMI 30-39.9): ICD-10-CM

## 2024-11-04 PROCEDURE — 3074F SYST BP LT 130 MM HG: CPT | Performed by: NURSE PRACTITIONER

## 2024-11-04 PROCEDURE — 3078F DIAST BP <80 MM HG: CPT | Performed by: NURSE PRACTITIONER

## 2024-11-04 PROCEDURE — 99213 OFFICE O/P EST LOW 20 MIN: CPT | Performed by: NURSE PRACTITIONER

## 2024-11-04 RX ORDER — CLOTRIMAZOLE AND BETAMETHASONE DIPROPIONATE 10; .64 MG/G; MG/G
1 CREAM TOPICAL 2 TIMES DAILY
Qty: 45 G | Refills: 0 | Status: SHIPPED | OUTPATIENT
Start: 2024-11-04

## 2024-11-04 ASSESSMENT — FIBROSIS 4 INDEX: FIB4 SCORE: 0.97

## 2024-11-05 NOTE — PROGRESS NOTES
"Chief Complaint   Patient presents with    Rash     Redness / itchy L knee      Verbal consent was acquired by the patient to use [employer] ambient listening note generation during this visit Yes      HPI:  History of Present Illness  The patient presents for evaluation of a rash.    She has been experiencing a progressively worsening rash on her left knee for the past month. The rash is characterized by extreme dryness and itchiness. It initially appeared as a small spot but has since expanded. Despite applying hydrocortisone and triple antibiotic ointment, she has not seen any improvement. Additionally, she reports that her skin is excessively dry.    She is currently on a 1.7 mg dose of Wegovy and has lost 25 pounds. She underwent hand surgery in 04/2024 and subsequently developed diverticulitis, which required a week-long hospital stay. She did not take her medication during this period.    ROS:  As documented in history of present illness above    Exam:   /64 (BP Location: Right arm, Patient Position: Sitting, BP Cuff Size: Adult)   Pulse 90   Temp 37.3 °C (99.1 °F)   Resp 14   Ht 1.626 m (5' 4\")   Wt 75.8 kg (167 lb 3.2 oz)   SpO2 97%   BMI 28.70 kg/m²     Constitutional: Alert, no distress, well-groomed.  Skin: Multiple erythematous patches on the left knee with scaly edges.   Eye: Equal, round and reactive, conjunctiva clear, lids normal.  ENMT: Lips without lesions, good dentition, moist mucous membranes.  Neck: Trachea midline, no masses, no thyromegaly.  Respiratory: Unlabored respiratory effort, no cough.  MSK: Normal gait, moves all extremities.  Neuro: Grossly non-focal.   Psych: Alert and oriented x3, normal affect and mood.    Assessment / Plan:  Assessment & Plan  1. Rash.  The rash appears to be fungal in nature. It has been present for about a month and has been worsening, although it looks better today compared to Thursday. Hydrocortisone and triple antibiotic ointment have been " tried without relief. A prescription for Lotrisone has been provided, to be applied twice daily for at least 2 weeks. If there is no improvement by the first week, she should inform the provider.    2. Obesity  3. Prediabetes  A refill for Wegovy 1.7 mg has been sent to the [facility] on [address]. She has reported a weight loss of at least 25 pounds.

## 2024-11-12 ENCOUNTER — APPOINTMENT (RX ONLY)
Dept: URBAN - METROPOLITAN AREA CLINIC 6 | Facility: CLINIC | Age: 59
Setting detail: DERMATOLOGY
End: 2024-11-12

## 2024-11-12 DIAGNOSIS — L82.0 INFLAMED SEBORRHEIC KERATOSIS: ICD-10-CM

## 2024-11-12 DIAGNOSIS — L82.1 OTHER SEBORRHEIC KERATOSIS: ICD-10-CM

## 2024-11-12 DIAGNOSIS — D18.0 HEMANGIOMA: ICD-10-CM

## 2024-11-12 DIAGNOSIS — Z71.89 OTHER SPECIFIED COUNSELING: ICD-10-CM

## 2024-11-12 DIAGNOSIS — L57.0 ACTINIC KERATOSIS: ICD-10-CM

## 2024-11-12 DIAGNOSIS — L81.4 OTHER MELANIN HYPERPIGMENTATION: ICD-10-CM

## 2024-11-12 DIAGNOSIS — D22 MELANOCYTIC NEVI: ICD-10-CM

## 2024-11-12 PROBLEM — D22.5 MELANOCYTIC NEVI OF TRUNK: Status: ACTIVE | Noted: 2024-11-12

## 2024-11-12 PROBLEM — D18.01 HEMANGIOMA OF SKIN AND SUBCUTANEOUS TISSUE: Status: ACTIVE | Noted: 2024-11-12

## 2024-11-12 PROCEDURE — ? SUNSCREEN RECOMMENDATIONS

## 2024-11-12 PROCEDURE — 17110 DESTRUCTION B9 LES UP TO 14: CPT

## 2024-11-12 PROCEDURE — 99203 OFFICE O/P NEW LOW 30 MIN: CPT | Mod: 25

## 2024-11-12 PROCEDURE — ? LIQUID NITROGEN

## 2024-11-12 PROCEDURE — 17000 DESTRUCT PREMALG LESION: CPT | Mod: 59

## 2024-11-12 PROCEDURE — ? COUNSELING

## 2024-11-12 ASSESSMENT — LOCATION SIMPLE DESCRIPTION DERM
LOCATION SIMPLE: ABDOMEN
LOCATION SIMPLE: LEFT CHEEK
LOCATION SIMPLE: RIGHT SHOULDER
LOCATION SIMPLE: CHEST
LOCATION SIMPLE: LEFT HAND
LOCATION SIMPLE: RIGHT BREAST
LOCATION SIMPLE: RIGHT HAND

## 2024-11-12 ASSESSMENT — LOCATION DETAILED DESCRIPTION DERM
LOCATION DETAILED: RIGHT RADIAL DORSAL HAND
LOCATION DETAILED: RIGHT MEDIAL SUPERIOR CHEST
LOCATION DETAILED: LEFT INFERIOR MEDIAL MALAR CHEEK
LOCATION DETAILED: RIGHT MEDIAL BREAST 4-5:00 REGION
LOCATION DETAILED: RIGHT POSTERIOR SHOULDER
LOCATION DETAILED: EPIGASTRIC SKIN
LOCATION DETAILED: PERIUMBILICAL SKIN
LOCATION DETAILED: LEFT LATERAL SUPERIOR CHEST
LOCATION DETAILED: LEFT ULNAR DORSAL HAND

## 2024-11-12 ASSESSMENT — LOCATION ZONE DERM
LOCATION ZONE: FACE
LOCATION ZONE: HAND
LOCATION ZONE: ARM
LOCATION ZONE: TRUNK

## 2024-11-12 NOTE — PROCEDURE: LIQUID NITROGEN
Medical Necessity Information: It is in your best interest to select a reason for this procedure from the list below. All of these items fulfill various CMS LCD requirements except the new and changing color options.
Render Note In Bullet Format When Appropriate: No
Spray Paint Text: The liquid nitrogen was applied to the skin utilizing a spray paint frosting technique.
Medical Necessity Clause: This procedure was medically necessary because the lesions that were treated were:
Show Aperture Variable?: Yes
Consent: The patient's consent was obtained including but not limited to risks of crusting, scabbing, blistering, scarring, darker or lighter pigmentary change, recurrence, incomplete removal and infection.
Detail Level: Detailed
Post-Care Instructions: I reviewed with the patient in detail post-care instructions. Patient is to wear sunprotection, and avoid picking at any of the treated lesions. Pt may apply Vaseline to crusted or scabbing areas.
Duration Of Freeze Thaw-Cycle (Seconds): 3
Number Of Freeze-Thaw Cycles: 3 freeze-thaw cycles

## 2024-12-17 DIAGNOSIS — E66.9 OBESITY (BMI 30-39.9): ICD-10-CM

## 2024-12-17 DIAGNOSIS — R73.03 PREDIABETES: ICD-10-CM

## 2025-01-09 ENCOUNTER — APPOINTMENT (OUTPATIENT)
Dept: MEDICAL GROUP | Facility: LAB | Age: 60
End: 2025-01-09
Payer: COMMERCIAL

## 2025-01-09 ENCOUNTER — HOSPITAL ENCOUNTER (OUTPATIENT)
Dept: RADIOLOGY | Facility: MEDICAL CENTER | Age: 60
End: 2025-01-09
Attending: NURSE PRACTITIONER
Payer: COMMERCIAL

## 2025-01-09 VITALS
SYSTOLIC BLOOD PRESSURE: 92 MMHG | TEMPERATURE: 98.5 F | WEIGHT: 163.6 LBS | OXYGEN SATURATION: 94 % | DIASTOLIC BLOOD PRESSURE: 52 MMHG | BODY MASS INDEX: 28.08 KG/M2 | HEART RATE: 73 BPM

## 2025-01-09 DIAGNOSIS — M25.511 CHRONIC RIGHT SHOULDER PAIN: ICD-10-CM

## 2025-01-09 DIAGNOSIS — R93.1 AGATSTON CAC SCORE, <100: ICD-10-CM

## 2025-01-09 DIAGNOSIS — E78.5 DYSLIPIDEMIA: ICD-10-CM

## 2025-01-09 DIAGNOSIS — G89.29 CHRONIC RIGHT SHOULDER PAIN: ICD-10-CM

## 2025-01-09 DIAGNOSIS — E78.41 ELEVATED LIPOPROTEIN(A): ICD-10-CM

## 2025-01-09 PROCEDURE — 73030 X-RAY EXAM OF SHOULDER: CPT | Mod: RT

## 2025-01-09 PROCEDURE — 3074F SYST BP LT 130 MM HG: CPT | Performed by: NURSE PRACTITIONER

## 2025-01-09 PROCEDURE — 99214 OFFICE O/P EST MOD 30 MIN: CPT | Performed by: NURSE PRACTITIONER

## 2025-01-09 PROCEDURE — 3078F DIAST BP <80 MM HG: CPT | Performed by: NURSE PRACTITIONER

## 2025-01-09 RX ORDER — MELOXICAM 7.5 MG/1
7.5-15 TABLET ORAL DAILY
Qty: 30 TABLET | Refills: 1 | Status: SHIPPED | OUTPATIENT
Start: 2025-01-09

## 2025-01-09 RX ORDER — ATORVASTATIN CALCIUM 20 MG/1
20 TABLET, FILM COATED ORAL NIGHTLY
Qty: 90 TABLET | Refills: 3 | Status: SHIPPED | OUTPATIENT
Start: 2025-01-09

## 2025-01-09 ASSESSMENT — FIBROSIS 4 INDEX: FIB4 SCORE: 0.97

## 2025-01-09 ASSESSMENT — PATIENT HEALTH QUESTIONNAIRE - PHQ9: CLINICAL INTERPRETATION OF PHQ2 SCORE: 0

## 2025-01-09 NOTE — PROGRESS NOTES
Chief Complaint   Patient presents with    Lab Results    Shoulder Pain     Right shoulder, X 3-4 months      Verbal consent was acquired by the patient to use [employer] ambient listening note generation during this visit Yes      HPI:  History of Present Illness  The patient presents for evaluation of elevated cholesterol levels, right shoulder pain, and elevated thyroid antibodies.    She has been experiencing persistent pain in her right shoulder for approximately 3 to 4 months, which disrupts her sleep. The pain is localized to the front of the shoulder and does not radiate down the arm. She reports no history of injury to the shoulder. She had rotator cuff surgery in  following a work-related injury, which resulted in a rotator cuff tear, bursa removal, bone spur, and supraspinatus muscle tear. She has attempted to alleviate the current pain through massage and chiropractic treatment but has not found relief. She is not interested in pursuing physical therapy.    She has an elevated lipoprotein a level and is concerned about her cholesterol. She underwent a CT cardiac score test in , which yielded a score of zero. She is currently on Wegovy and believes it may be contributing to a decrease in her cholesterol levels. She has expressed interest in having her sons, aged 37 and 31, undergo testing for high cholesterol. She is interested in establishing with vascular medicine.     She has elevated thyroid antibodies, but her thyroid function appears normal. She is not currently on any medication for her thyroid condition.    Supplemental Information  She had an LJ titer test in , which was positive, but the rheumatologist at the time did not find any significant findings.    FAMILY HISTORY  Her mother  of a heart attack at 58 and had other health issues.    MEDICATIONS  Current: calcium with vitamin D, Wegovy    Results:  Results  Laboratory Studies  Lipoprotein A is elevated. LDL cholesterol is  141. Thyroid antibodies are elevated. Vitamin D is slightly low. Lipoprotein B is elevated.     Imaging  CT cardiac score was zero in 2022.    ROS:  As documented in history of present illness above    Exam:   BP 92/52 (BP Location: Right arm, Patient Position: Sitting, BP Cuff Size: Adult)   Pulse 73   Temp 36.9 °C (98.5 °F) (Temporal)   Wt 74.2 kg (163 lb 9.6 oz)   SpO2 94%   BMI 28.08 kg/m²     Constitutional: Alert, no distress, well-groomed.  Skin: Warm, dry, good turgor, no rashes in visible areas.  Eye: Equal, round and reactive, conjunctiva clear, lids normal.  ENMT: Lips without lesions, good dentition, moist mucous membranes.  Neck: Trachea midline, no masses, no thyromegaly.  Respiratory: Unlabored respiratory effort, no cough.  MSK: Normal gait, moves all extremities.  Right Shoulder: Full ROM, full strength, empty can test negative  Neuro: Grossly non-focal.   Psych: Alert and oriented x3, normal affect and mood.    Assessment / Plan:  Assessment & Plan  1. Elevated cholesterol levels.  2.  Elevated lipoprotein (a)  3.  Agate send CAC score <100  Her lipoprotein (a) levels are significantly elevated, indicating a potential genetic predisposition to potential future harm from cholesterol. This, coupled with her high cholesterol levels, increases her risk for cardiovascular events such as heart attacks or strokes. She is currently on Wegovy, which indirectly aids in cholesterol reduction by promoting healthier eating habits. A prescription for atorvastatin 20 mg has been issued, with instructions to take it at night before bed. She has been informed about the potential side effects of statins, including muscle aches and cramping, and advised to report any intolerable side effects. A referral to vascular medicine has been made for further evaluation and management of her lipoprotein A levels. Fasting labs have been ordered to monitor her cholesterol and liver function in approximately 3 months.    2.  Right shoulder pain.  She reports pain in the right shoulder, especially when moving the arm in certain directions, which has been ongoing for about 3-4 months. There is no known recent injury, but she had rotator cuff surgery in 2007. An x-ray of the shoulder has been ordered. A referral to orthopedics has been made for further evaluation. A prescription for meloxicam has been issued to help manage the pain and improve sleep until she can see the orthopedic specialist.    Follow-up  The patient is scheduled for a follow-up visit in April 2025, subsequent to her lab work, for her wellness check.    Please note that this dictation was created using voice recognition software. I have made every reasonable attempt to correct obvious errors, but I expect that there are errors of grammar and possibly content that I did not discover before finalizing the note.

## 2025-01-16 ENCOUNTER — OFFICE VISIT (OUTPATIENT)
Dept: CARDIOLOGY | Facility: MEDICAL CENTER | Age: 60
End: 2025-01-16
Attending: INTERNAL MEDICINE
Payer: COMMERCIAL

## 2025-01-16 VITALS
HEIGHT: 64 IN | HEART RATE: 80 BPM | SYSTOLIC BLOOD PRESSURE: 109 MMHG | DIASTOLIC BLOOD PRESSURE: 75 MMHG | BODY MASS INDEX: 27.66 KG/M2 | WEIGHT: 162 LBS

## 2025-01-16 DIAGNOSIS — E78.5 DYSLIPIDEMIA: ICD-10-CM

## 2025-01-16 DIAGNOSIS — E78.41 ELEVATED LIPOPROTEIN(A): ICD-10-CM

## 2025-01-16 DIAGNOSIS — I34.0 MITRAL VALVE INSUFFICIENCY, UNSPECIFIED ETIOLOGY: ICD-10-CM

## 2025-01-16 DIAGNOSIS — I73.00 RAYNAUD'S PHENOMENON WITHOUT GANGRENE: ICD-10-CM

## 2025-01-16 PROCEDURE — 99214 OFFICE O/P EST MOD 30 MIN: CPT | Performed by: INTERNAL MEDICINE

## 2025-01-16 PROCEDURE — 99212 OFFICE O/P EST SF 10 MIN: CPT

## 2025-01-16 ASSESSMENT — FIBROSIS 4 INDEX: FIB4 SCORE: 0.97

## 2025-01-16 NOTE — PROGRESS NOTES
FAMILY LIPID CLINIC - INITIAL VISIT   01/16/25     Nel Terry has been referred for evaluation and management of dyslipidemia  Referral Source: Anne Chicas A.P.R.N.   Date First Established in Clinic: Jan 2025    Subjective      CURRENT MED MGMT  Current Lipid Lowering Meds:   Statin: atorvastatin 20 mg - started a week ago   Non-Statin: None  Supplements: None  Current adverse drug reactions/side effects? no  Adherence? yes    LIFESTYLE MGMT  Change in weight: lost about 30 pounds since starting semaglutide  Exercise habits: sporadic irregular exercise  Dietary patterns: portion controlled  Etoh: see sochx  Barriers to care/SDOH: none  Tobacco:  reports that she has quit smoking. Her smoking use included cigarettes. She started smoking about 22 years ago. She has a 0.3 pack-year smoking history. She has never been exposed to tobacco smoke. She has never used smokeless tobacco.     PERTINENT HLD PMHX  Age at Initial Diagnosis of Dyslipidemia: last few years  Baseline Lipids Prior to Treatment:   Lab Results   Component Value Date/Time    CHOLSTRLTOT 220 (H) 04/02/2024 06:33 AM     (H) 04/02/2024 06:33 AM    HDL 40 04/02/2024 06:33 AM    TRIGLYCERIDE 96 04/02/2024 06:33 AM       History of ASCVD: None  Other Established (non-atherosclerotic) Vascular Disease, if Present: None  Secondary causes of dyslipidemia:  Endocrine/Hypothyroidism:  none reported   Liver disease: none reported   Renal disease/nephrotic syndrome:  none reported  Dietary-induced (ketogenic, lean mass hyper-responder)? no  Medications: Estrogen  Previously Attempted Interventions for Lipids - including outcome  Statin: none     Outcome: not applicable  Non-Statin: none  Outcome: not applicable    OTHER CARDIOVASCULAR RISK FACTORS  Antithrombotic therapy: No,  Blood pressure: no h/o htn  Dysglycemia: No    Patient Active Problem List   Diagnosis    Antinuclear factor positive    Anxiety    Vitamin D deficiency    Hormone  replacement therapy    Menopausal symptoms    Obesity (BMI 30-39.9)    Raynaud's phenomenon without gangrene    Inappropriate sinus tachycardia (HCC)    History of diverticulitis    Primary osteoarthritis of first carpometacarpal joint of right hand    Medial meniscus tear    Mitral valve prolapse    Mild mitral regurgitation    Prediabetes    Dyslipidemia    OAB (overactive bladder)    Urge urinary incontinence    History of suburethral sling procedure    Atrophic vaginitis    Post-menopausal bleeding    Endocervical polyp    Degenerative arthritis of left knee    Trigger finger, right index finger    Trigger finger, right middle finger    Dupuytren's contracture of right hand    Elevated lipoprotein(a)    Agatston CAC score, <100     PSH:   has a past surgical history that includes gyn surgery; other orthopedic surgery; other; sinusotomies; pr knee scope,med/lat menisectomy (Left, 4/6/2023); hysteroscopy with myosure (6/2/2023); pr percut implant,neuroelec,sacral nerve (10/9/2023); pr ins/rpl prph sac/gstr npg/r (N/A, 10/25/2023); pr repair intercarp/carp-metacarp jt (Right, 4/11/2024); pr transplant forearm/wrist tendon (Right, 4/11/2024); and pr total knee arthroplasty (Left, 7/17/2024).    Current Outpatient Medications on File Prior to Visit   Medication Sig Dispense Refill    methylPREDNISolone (MEDROL DOSEPAK) 4 MG Tablet Therapy Pack Follow schedule on package instructions. 21 Tablet 0    atorvastatin (LIPITOR) 20 MG Tab Take 1 Tablet by mouth every evening. 90 Tablet 3    meloxicam (MOBIC) 7.5 MG Tab Take 1-2 Tablets by mouth every day. 30 Tablet 1    Semaglutide (WEGOVY) 2.4 MG/0.75ML Solution Auto-injector Pen-injector Inject 0.75 mL under the skin every 7 days. 3 mL 3    clotrimazole-betamethasone (LOTRISONE) 1-0.05 % Cream Apply 1 Application topically 2 times a day. 45 g 0    progesterone (PROMETRIUM) 200 MG capsule TAKE 1 CAPSULE BY MOUTH EVERY EVENING 90 Capsule 3    estradiol (CLIMARA) 0.075  "MG/24HR PATCH WEEKLY APPLY 1 PATCH TOPICALLY TO THE SKIN EVERY 7 DAYS 12 Patch 3    fluticasone (FLONASE ALLERGY RELIEF) 50 MCG/ACT nasal spray 0      traMADol (ULTRAM) 50 MG Tab 20      metoprolol SR (TOPROL XL) 25 MG TABLET SR 24 HR Take 1 Tablet by mouth every day. 90 Tablet 3    diphenhydrAMINE (BENADRYL) 25 MG Tab Take 25 mg by mouth at bedtime as needed (for sleep).       No current facility-administered medications on file prior to visit.      Allergies   Allergen Reactions    Hydrocodone-Acetaminophen      Headache      Family History   Problem Relation Age of Onset    Heart Disease Mother         58    Hypertension Mother     Hyperlipidemia Mother     Breast Cancer Maternal Aunt 30    Stomach Cancer Maternal Aunt     Cancer Paternal Grandmother     Breast Cancer Paternal Grandmother    Mom - premature CHD in mom in 50s  Dad - cabg 70s    Social History     Tobacco Use    Smoking status: Former     Average packs/day: 0.3 packs/day for 1 year (0.3 ttl pk-yrs)     Types: Cigarettes     Start date: 2003     Passive exposure: Never    Smokeless tobacco: Never   Vaping Use    Vaping status: Never Used   Substance Use Topics    Alcohol use: Yes     Alcohol/week: 0.6 oz     Types: 1 Standard drinks or equivalent per week     Comment: occ    Drug use: Not Currently         Objective    Vitals:    01/16/25 1356   BP: 109/75   BP Location: Left arm   Patient Position: Sitting   BP Cuff Size: Adult   Pulse: 80   Weight: 73.5 kg (162 lb)   Height: 1.626 m (5' 4\")      BMI Readings from Last 1 Encounters:   01/16/25 27.81 kg/m²      Wt Readings from Last 3 Encounters:   01/16/25 73.5 kg (162 lb)   01/15/25 72.1 kg (159 lb)   01/09/25 74.2 kg (163 lb 9.6 oz)     BP Readings from Last 5 Encounters:   01/16/25 109/75   01/09/25 92/52   11/04/24 108/64   07/17/24 111/62   04/24/24 102/68     Physical Exam  Vitals reviewed.   Constitutional:       General: She is not in acute distress.     Appearance: She is not diaphoretic. " "  HENT:      Head: Normocephalic and atraumatic.   Eyes:      General: No scleral icterus.     Conjunctiva/sclera: Conjunctivae normal.   Neck:      Vascular: No carotid bruit.   Cardiovascular:      Rate and Rhythm: Normal rate and regular rhythm.      Heart sounds: Normal heart sounds. No murmur heard.  Pulmonary:      Effort: Pulmonary effort is normal. No respiratory distress.      Breath sounds: Normal breath sounds. No wheezing or rales.   Musculoskeletal:      Right lower leg: No edema.      Left lower leg: No edema.   Skin:     Coloration: Skin is not pale.   Neurological:      General: No focal deficit present.      Mental Status: She is alert and oriented to person, place, and time.      Cranial Nerves: No cranial nerve deficit.      Coordination: Coordination normal.      Gait: Gait is intact. Gait normal.   Psychiatric:         Mood and Affect: Mood and affect normal.         Behavior: Behavior normal.       DATA REVIEW:  Most Recent Lipid Panel:   Lab Results   Component Value Date/Time    CHOLSTRLTOT 220 (H) 04/02/2024 06:33 AM     (H) 04/02/2024 06:33 AM    HDL 40 04/02/2024 06:33 AM    TRIGLYCERIDE 96 04/02/2024 06:33 AM     Lab Results   Component Value Date/Time     (H) 04/02/2024 06:33 AM     (H) 03/28/2023 08:13 AM     (H) 03/01/2022 08:59 AM      No results found for: \"LIPOPROTA\"   No results found for: \"APOB\"   No results found for: \"CRPHIGHSEN\"    Other Pertinent Blood Work:   Lab Results   Component Value Date    SODIUM 138 04/02/2024    POTASSIUM 4.4 04/02/2024    CHLORIDE 101 04/02/2024    CO2 20 04/02/2024    ANION 17.0 (H) 04/02/2024    GLUCOSE 89 04/02/2024    BUN 10 04/02/2024    CREATININE 0.60 04/02/2024    CALCIUM 9.7 04/02/2024    ASTSGOT 18 04/02/2024    ALTSGPT 11 04/02/2024    ALKPHOSPHAT 93 04/02/2024    TBILIRUBIN 0.2 04/02/2024    ALBUMIN 4.2 04/02/2024    AGRATIO 1.4 04/02/2024    TSHULTRASEN 2.150 04/02/2024     Blood work from December " 2024  Lipoprotein a 597 mcg/L  Glucose 78  , sodium 135, potassium 4.1, AST 14, ALT 9  Albumin in urine less than 2  CBC within normal limits  CRP 3.2  Small LDL particle #1596, small LDL particle #272, LDL pattern A, LDL peak size 219  Total cholesterol 209, HDL 46, LDL 41    VASCULAR IMAGING:    CAC Score: Zero (2022)    Echo 2022 - mild MR; unable to estimate pulmonary pressures        ASSESSMENT AND PLAN  1. Dyslipidemia  Lipid Profile    APOLIPOPROTEIN B    Comp Metabolic Panel      2. Elevated lipoprotein(a)  Lipid Profile    APOLIPOPROTEIN B    Lipoprotein (a)      3. Raynaud's phenomenon without gangrene        4. Mitral valve insufficiency, unspecified etiology  EC-ECHOCARDIOGRAM COMPLETE W/O CONT        Patient Type, check all that apply: Primary Prevention    Major ASCVD events: None      Established (non-atherosclerotic) Vascular Disease:     # Valvular heart disease -mild MR on echocardiogram in 2022.  Will repeat echocardiogram to rule out progression of MR and calcific aortic valve disease given very high lipoprotein a    # Inappropriate sinus tachycardia -diagnosed by cardiology.  Continue metoprolol ER 25 mg a day as recommended by cardiology.  Otherwise defer further workup and management to cardiology    # Raynaud's phenomenon with positive LJ -symptoms seem relatively mild.  Avoid pharmacologic therapy at present.  Continue to avoid triggers.  We did talk about the risk of systemic sclerosis and/or pulmonary hypertension.  Will recheck echocardiogram with attention to pulmonary pressures.  Defer to PCP as to whether or not to workup further for systemic sclerosis at this time    Secondary causes/contributors: HRT-see below    Evidence of genetic dyslipidemia: No FH but does have high lp(a)  FH genotyping: NO  -Recommended cascade family screening of fasting lipid panel and lipoprotein a    ACC/AHA Indication for Statin Therapy:  None    ASCVD risk calculations  The 10-year ASCVD risk  "score (Emi FRANKEL, et al., 2019) is: 2.7%, <5% \"low risk\"    Other Significant Risk Markers:  High-risk conditions: None   Risk-enhancers:   Famhx of premature ASCVD   Lipoprotein(a): Very High (>100 mg/dl or >225 nmol/L   CAC score - ZERO - 2022    Goal LDL-C and ApolipoproteinB/non-HDL-C:  LDL-C <100 mg/dl  apoB <90 mg/dl  At goals? no    LIFESTYLE INTERVENTIONS  TOBACCO: Quit many years ago.  - continued complete avoidance of all tobacco products   PHYSICAL ACTIVITY: Add some resistance training to avoid sarcopenia  NUTRITION: Increase protein intake  ETOH: Continue to limit  WT MGMT: Patient is lost 30 pounds since starting on GLP-1.  Goal weight is around 140.  Continue slow steady weight loss    LIPID-LOWERING MEDICATION MANAGEMENT:     Statin Therapy:   -Continue atorvastatin 20 mg daily for now but will likely need intensification of pharmacologic therapy    Non-Statin Meds:   -Consider add-on medication as needed to get to goal    Recommended Supplements: None     BLOOD PRESSURE MANAGEMENT:  ACC/AHA goal <130/80  Good control both in the office and at home  -Continue metoprolol ER 25 mg a day for inappropriate sinus tachycardia    GLYCEMIC STATUS: Normal  -Continue lifestyle modification    ANTITHROMBOTIC THERAPY - none currently given CAC score, but could consider low dose asa in future    OTHER    # HRT -as we discussed, estrogen may be prothrombotic in older patients.  Lipoprotein a may be prothrombotic as well and so I think this is a potentially worrisome combination.  Of asked her to talk to her PCP about potentially weaning off over time    Studies Ordered at Todays Visit: Echocardiogram  Blood Work To Be Obtained Prior to Next Visit: as noted above  Follow-Up: 3 months    Michael J Bloch, M.D.  ZULEIKA, Board-certified clinical lipidologist   [facility]   [facility]   [phone]      CC:  DANITA Mendez    "

## 2025-01-18 ENCOUNTER — HOSPITAL ENCOUNTER (OUTPATIENT)
Dept: LAB | Facility: MEDICAL CENTER | Age: 60
End: 2025-01-18
Attending: INTERNAL MEDICINE
Payer: COMMERCIAL

## 2025-01-18 DIAGNOSIS — E78.5 DYSLIPIDEMIA: ICD-10-CM

## 2025-01-18 DIAGNOSIS — E78.41 ELEVATED LIPOPROTEIN(A): ICD-10-CM

## 2025-01-18 LAB
ALBUMIN SERPL BCP-MCNC: 4.5 G/DL (ref 3.2–4.9)
ALBUMIN/GLOB SERPL: 1.5 G/DL
ALP SERPL-CCNC: 80 U/L (ref 30–99)
ALT SERPL-CCNC: 9 U/L (ref 2–50)
ANION GAP SERPL CALC-SCNC: 13 MMOL/L (ref 7–16)
AST SERPL-CCNC: 15 U/L (ref 12–45)
BILIRUB SERPL-MCNC: 0.3 MG/DL (ref 0.1–1.5)
BUN SERPL-MCNC: 14 MG/DL (ref 8–22)
CALCIUM ALBUM COR SERPL-MCNC: 9.3 MG/DL (ref 8.5–10.5)
CALCIUM SERPL-MCNC: 9.7 MG/DL (ref 8.5–10.5)
CHLORIDE SERPL-SCNC: 102 MMOL/L (ref 96–112)
CHOLEST SERPL-MCNC: 155 MG/DL (ref 100–199)
CO2 SERPL-SCNC: 23 MMOL/L (ref 20–33)
CREAT SERPL-MCNC: 0.66 MG/DL (ref 0.5–1.4)
GFR SERPLBLD CREATININE-BSD FMLA CKD-EPI: 101 ML/MIN/1.73 M 2
GLOBULIN SER CALC-MCNC: 3 G/DL (ref 1.9–3.5)
GLUCOSE SERPL-MCNC: 77 MG/DL (ref 65–99)
HDLC SERPL-MCNC: 38 MG/DL
LDLC SERPL CALC-MCNC: 100 MG/DL
POTASSIUM SERPL-SCNC: 4 MMOL/L (ref 3.6–5.5)
PROT SERPL-MCNC: 7.5 G/DL (ref 6–8.2)
SODIUM SERPL-SCNC: 138 MMOL/L (ref 135–145)
TRIGL SERPL-MCNC: 85 MG/DL (ref 0–149)

## 2025-01-18 PROCEDURE — 36415 COLL VENOUS BLD VENIPUNCTURE: CPT

## 2025-01-18 PROCEDURE — 80061 LIPID PANEL: CPT

## 2025-01-18 PROCEDURE — 82172 ASSAY OF APOLIPOPROTEIN: CPT

## 2025-01-18 PROCEDURE — 80053 COMPREHEN METABOLIC PANEL: CPT

## 2025-01-18 PROCEDURE — 83695 ASSAY OF LIPOPROTEIN(A): CPT

## 2025-01-21 LAB
APO B100 SERPL-MCNC: 98 MG/DL (ref 60–117)
LPA SERPL-MCNC: 242 MG/DL

## 2025-02-05 ENCOUNTER — OFFICE VISIT (OUTPATIENT)
Dept: MEDICAL GROUP | Facility: LAB | Age: 60
End: 2025-02-05
Payer: COMMERCIAL

## 2025-02-05 VITALS
TEMPERATURE: 97.9 F | HEIGHT: 64 IN | SYSTOLIC BLOOD PRESSURE: 116 MMHG | WEIGHT: 162.8 LBS | DIASTOLIC BLOOD PRESSURE: 64 MMHG | HEART RATE: 78 BPM | BODY MASS INDEX: 27.79 KG/M2 | OXYGEN SATURATION: 95 % | RESPIRATION RATE: 14 BRPM

## 2025-02-05 DIAGNOSIS — G43.109 MIGRAINE WITH AURA AND WITHOUT STATUS MIGRAINOSUS, NOT INTRACTABLE: ICD-10-CM

## 2025-02-05 PROCEDURE — 3074F SYST BP LT 130 MM HG: CPT | Performed by: NURSE PRACTITIONER

## 2025-02-05 PROCEDURE — 3078F DIAST BP <80 MM HG: CPT | Performed by: NURSE PRACTITIONER

## 2025-02-05 PROCEDURE — 99213 OFFICE O/P EST LOW 20 MIN: CPT | Performed by: NURSE PRACTITIONER

## 2025-02-05 ASSESSMENT — FIBROSIS 4 INDEX: FIB4 SCORE: 0.9

## 2025-02-06 NOTE — PROGRESS NOTES
Chief Complaint   Patient presents with    Migraine     X 6 months , recently  more frequent      Verbal consent was acquired by the patient to use [employer] ambient listening note generation during this visit Yes      HPI:  History of Present Illness  The patient presents for evaluation of migraines.    She began experiencing migraines approximately 6 months ago, with an increase in frequency over the past month. She has been tracking the episodes, noting occurrences on 01/14/2024, 01/27/2024, 01/28/2024, 01/31/2024, and another episode this morning. The most recent episode was accompanied by a severe headache and nausea, symptoms she typically does not experience. Her migraines are often characterized by sudden visual disturbances, particularly while driving, necessitating immediate cessation of her current activity. The duration of these episodes varies, with most lasting about 30 minutes, although today's episode persisted for 1.5 hours. She reports no new vision loss outside of her migraine episodes. She also reports no associated weakness or worsening of symptoms when lying down. She typically manages her symptoms by turning off lights and keeping her eyes closed. She estimates that she experiences less than 15 migraines per month. She is unable to take ibuprofen due to her current use of meloxicam for shoulder pain. She has declined offers of Excedrin from her coworkers. She has been taking at least two doses of meloxicam daily due to severe shoulder pain and resultant sleep disturbances.    Supplemental Information  She has a bladder stimulator and needs a special MRI machine, which she can not get into until the end of March. She is currently taking meloxicam for her shoulder pain. She has been taking at least 2 tablets a day because she can not sleep.    MEDICATIONS  meloxicam    ROS:  As documented in history of present illness above    Exam:   /64 (BP Location: Right arm, Patient Position:  "Sitting, BP Cuff Size: Adult)   Pulse 78   Temp 36.6 °C (97.9 °F)   Resp 14   Ht 1.626 m (5' 4\")   Wt 73.8 kg (162 lb 12.8 oz)   SpO2 95%   BMI 27.94 kg/m²     Constitutional: Alert, no distress, well-groomed.  Skin: Warm, dry, good turgor, no rashes in visible areas.  Eye: Equal, round and reactive, conjunctiva clear, lids normal.  ENMT: Lips without lesions, good dentition, moist mucous membranes.  Neck: Trachea midline, no masses, no thyromegaly.  Respiratory: Unlabored respiratory effort, no cough.  Neuro: CN II-XII intact, strength 5/5 in all muscle groups, sensation intact bilaterally, coordination intact bilaterally  MSK: Normal gait, moves all extremities.  Neuro: Grossly non-focal.   Psych: Alert and oriented x3, normal affect and mood.    Assessment / Plan:  Assessment & Plan  1. Migraines.  The sudden onset of migraines at her age is concerning, necessitating further investigation. She has experienced more than 5 attacks, each with an aura that gradually spreads over 5 minutes, is unilateral, and is either accompanied by or followed by a headache within 60 minutes. These symptoms align with the diagnostic criteria for migraines. She has been advised to maintain adequate hydration and avoid potential triggers such as fluorescent lighting, tyramine, aged foods, wine, alcohol, cheeses, preserved foods, and MSG. She has been encouraged to keep a log of her migraines, including dietary intake, sleep duration, hydration levels, and medication use. Recommended using the Migraine Buddy ronan or the free \"Nehemias\" ronan to track her migraines and identify effective pain relief strategies. An MRI of the brain will be ordered to rule out any underlying conditions. She will be contacted via [employer] once the results are available. If the MRI results are normal, we will discuss potential abortive or preventive treatments, or consider a referral to neurology. Use over-the-counter acetaminophen (Tylenol) as needed; follow " package directions for dosing.    Please note that this dictation was created using voice recognition software. I have made every reasonable attempt to correct obvious errors, but I expect that there are errors of grammar and possibly content that I did not discover before finalizing the note.

## 2025-02-20 DIAGNOSIS — G89.29 CHRONIC RIGHT SHOULDER PAIN: ICD-10-CM

## 2025-02-20 DIAGNOSIS — M25.511 CHRONIC RIGHT SHOULDER PAIN: ICD-10-CM

## 2025-02-24 RX ORDER — MELOXICAM 7.5 MG/1
7.5-15 TABLET ORAL DAILY
Qty: 30 TABLET | Refills: 1 | Status: SHIPPED | OUTPATIENT
Start: 2025-02-24

## 2025-03-24 ENCOUNTER — HOSPITAL ENCOUNTER (OUTPATIENT)
Dept: RADIOLOGY | Facility: MEDICAL CENTER | Age: 60
End: 2025-03-24
Attending: STUDENT IN AN ORGANIZED HEALTH CARE EDUCATION/TRAINING PROGRAM
Payer: COMMERCIAL

## 2025-03-24 DIAGNOSIS — S46.011A TRAUMATIC COMPLETE TEAR OF RIGHT ROTATOR CUFF, INITIAL ENCOUNTER: ICD-10-CM

## 2025-03-24 PROCEDURE — 73221 MRI JOINT UPR EXTREM W/O DYE: CPT | Mod: RT

## 2025-03-25 ENCOUNTER — HOSPITAL ENCOUNTER (OUTPATIENT)
Dept: CARDIOLOGY | Facility: MEDICAL CENTER | Age: 60
End: 2025-03-25
Attending: INTERNAL MEDICINE
Payer: COMMERCIAL

## 2025-03-25 DIAGNOSIS — I34.0 MITRAL VALVE INSUFFICIENCY, UNSPECIFIED ETIOLOGY: ICD-10-CM

## 2025-03-25 LAB
LV EJECT FRACT  99904: 55
LV EJECT FRACT MOD 2C 99903: 61.13
LV EJECT FRACT MOD 4C 99902: 62.23
LV EJECT FRACT MOD BP 99901: 60.94

## 2025-03-25 PROCEDURE — 93306 TTE W/DOPPLER COMPLETE: CPT

## 2025-03-26 ENCOUNTER — RESULTS FOLLOW-UP (OUTPATIENT)
Dept: CARDIOLOGY | Facility: MEDICAL CENTER | Age: 60
End: 2025-03-26
Payer: COMMERCIAL

## 2025-03-26 ENCOUNTER — DOCUMENTATION (OUTPATIENT)
Dept: VASCULAR LAB | Facility: MEDICAL CENTER | Age: 60
End: 2025-03-26
Payer: COMMERCIAL

## 2025-03-31 ENCOUNTER — HOSPITAL ENCOUNTER (OUTPATIENT)
Dept: RADIOLOGY | Facility: MEDICAL CENTER | Age: 60
End: 2025-03-31
Attending: NURSE PRACTITIONER
Payer: COMMERCIAL

## 2025-03-31 DIAGNOSIS — G43.109 MIGRAINE WITH AURA AND WITHOUT STATUS MIGRAINOSUS, NOT INTRACTABLE: ICD-10-CM

## 2025-03-31 PROCEDURE — A9579 GAD-BASE MR CONTRAST NOS,1ML: HCPCS | Mod: JZ | Performed by: NURSE PRACTITIONER

## 2025-03-31 PROCEDURE — 70553 MRI BRAIN STEM W/O & W/DYE: CPT

## 2025-03-31 PROCEDURE — 700117 HCHG RX CONTRAST REV CODE 255: Mod: JZ | Performed by: NURSE PRACTITIONER

## 2025-03-31 RX ADMIN — GADOTERIDOL 15 ML: 279.3 INJECTION, SOLUTION INTRAVENOUS at 14:41

## 2025-04-01 ENCOUNTER — RESULTS FOLLOW-UP (OUTPATIENT)
Dept: MEDICAL GROUP | Facility: LAB | Age: 60
End: 2025-04-01
Payer: COMMERCIAL

## 2025-04-01 DIAGNOSIS — I47.11 INAPPROPRIATE SINUS TACHYCARDIA (HCC): ICD-10-CM

## 2025-04-01 PROBLEM — M75.101 RIGHT ROTATOR CUFF TEAR: Status: ACTIVE | Noted: 2025-04-01

## 2025-04-01 PROBLEM — M75.21 TENDONITIS OF LONG HEAD OF BICEPS BRACHII OF RIGHT SHOULDER: Status: ACTIVE | Noted: 2025-04-01

## 2025-04-01 PROBLEM — S43.431A SUPERIOR LABRUM ANTERIOR-TO-POSTERIOR (SLAP) TEAR OF RIGHT SHOULDER: Status: ACTIVE | Noted: 2025-04-01

## 2025-04-01 PROBLEM — M25.811 IMPINGEMENT OF RIGHT SHOULDER: Status: ACTIVE | Noted: 2025-04-01

## 2025-04-01 RX ORDER — METOPROLOL SUCCINATE 25 MG/1
25 TABLET, EXTENDED RELEASE ORAL DAILY
Qty: 90 TABLET | Refills: 3 | Status: SHIPPED | OUTPATIENT
Start: 2025-04-01

## 2025-04-02 ENCOUNTER — APPOINTMENT (OUTPATIENT)
Dept: MEDICAL GROUP | Facility: LAB | Age: 60
End: 2025-04-02
Payer: COMMERCIAL

## 2025-04-02 VITALS
OXYGEN SATURATION: 94 % | DIASTOLIC BLOOD PRESSURE: 64 MMHG | RESPIRATION RATE: 14 BRPM | HEART RATE: 72 BPM | BODY MASS INDEX: 28.27 KG/M2 | WEIGHT: 165.6 LBS | SYSTOLIC BLOOD PRESSURE: 108 MMHG | HEIGHT: 64 IN | TEMPERATURE: 97.9 F

## 2025-04-02 DIAGNOSIS — E78.5 DYSLIPIDEMIA: ICD-10-CM

## 2025-04-02 DIAGNOSIS — G43.109 MIGRAINE WITH AURA AND WITHOUT STATUS MIGRAINOSUS, NOT INTRACTABLE: ICD-10-CM

## 2025-04-02 PROCEDURE — 3074F SYST BP LT 130 MM HG: CPT | Performed by: NURSE PRACTITIONER

## 2025-04-02 PROCEDURE — 99214 OFFICE O/P EST MOD 30 MIN: CPT | Performed by: NURSE PRACTITIONER

## 2025-04-02 PROCEDURE — 3078F DIAST BP <80 MM HG: CPT | Performed by: NURSE PRACTITIONER

## 2025-04-02 RX ORDER — SUMATRIPTAN 50 MG/1
50 TABLET, FILM COATED ORAL
Qty: 9 TABLET | Refills: 3 | Status: SHIPPED | OUTPATIENT
Start: 2025-04-02

## 2025-04-02 ASSESSMENT — FIBROSIS 4 INDEX: FIB4 SCORE: 0.9

## 2025-04-02 NOTE — PROGRESS NOTES
"Subjective:     CC:   Chief Complaint   Patient presents with    Lab Results     Mri / echo       HPI:   [place] presents today with the following:  Follow-up on MRI and Echo   Headaches perisist and timing and occuance is highly variable. Aura is always present, however, does not always progress to headache  Has appt this month with [facility] for additional follow-up on echo.    ROS:         - NOTE: All other systems reviewed and are negative, except as in HPI.    Objective:     Exam: /64 (BP Location: Right arm, Patient Position: Sitting, BP Cuff Size: Adult)   Pulse 72   Temp 36.6 °C (97.9 °F)   Resp 14   Ht 1.626 m (5' 4\")   Wt 75.1 kg (165 lb 9.6 oz)   SpO2 94%  Body mass index is 28.43 kg/m².    General: Normal appearing. No distress.  HEENT: Normocephalic. Eyes conjunctiva clear lids without ptosis, pupils equal and reactive to light accommodation  Pulmonary: Clear to ausculation.  Normal effort. No rales, ronchi, or wheezing.  Cardiovascular: Regular rate and rhythm without murmur. Carotid and radial pulses are intact and equal bilaterally  Neurologic: Grossly nonfocal    Skin: Warm and dry.  No obvious lesions.  Musculoskeletal: Normal gait. No extremity cyanosis, clubbing, or edema.  Psych: Normal mood and affect. Alert and oriented x3. Judgment and insight is normal.    Assessment & Plan:     59 y.o. female with the following -     1. Migraine with aura and without status migrainosus, not intractable  Continued migraines, normal MRI, will refer to Neuro for specialty management. In the interim patient is agreeable to trial of sumatriptan peggy abortive therapy. Discussed this new medication and potential side effects.   - Referral to Neurology  - SUMAtriptan (IMITREX) 50 MG Tab; Take 1 Tablet by mouth one time as needed for Migraine for up to 1 dose.  Dispense: 9 Tablet; Refill: 3    2. Dyslipidemia  Lipid levels improving, elevated Lipoprotein prompting referral to vascular, Echo ordered and " showing MR but otherwise normal. Maintain follow-up with vascular. Continue atorvastatin

## 2025-04-08 NOTE — Clinical Note
REFERRAL APPROVAL NOTICE         Sent on April 8, 2025                   Nel Terry  [address] [place] [address]                   Dear Ms. Terry,    After a careful review of the medical information and benefit coverage, [facility] has processed your referral. See below for additional details.    If applicable, you must be actively enrolled with your insurance for coverage of the authorized service. If you have any questions regarding your coverage, please contact your insurance directly.    REFERRAL INFORMATION   Referral #:  [id number]  Referred-To Department    Referred-By Provider:  Neurology    DANITA Mendez   [place] [facility] [place]      [address]  [place] [address]  [phone] [address] [facility], [address]  [place] [address] [phone]    Referral Start Date:  04/02/2025  Referral End Date:   04/02/2026           SCHEDULING  If you do not already have an appointment, please call [phone] to make an appointment.   MORE INFORMATION  As a reminder, [facility] ownership has changed, meaning this location is now owned and operated by [facility]. As such, we want to clarify that our patients should expect to receive two separate bills for the services received at [facility] - one representing the [facility] facility fees as the owner of the establishment, and the other to represent the physician's services and subsequent fees. You can speak with your insurance carrier for a pricing estimate by calling the customer service number on the back of your card and ask about charges for a hospital outpatient visit.  If you do not already have a [facility] account, sign up at: [email or web address]  You can access your medical information, make appointments, see lab results, billing information, and more.  If you have questions regarding this referral, please contact  the [facility] Referrals department at:             [phone]. Monday -  Friday 7:30AM - 5:00PM.      Sincerely,  [facility]

## 2025-04-09 ENCOUNTER — TELEPHONE (OUTPATIENT)
Dept: VASCULAR LAB | Facility: MEDICAL CENTER | Age: 60
End: 2025-04-09
Payer: COMMERCIAL

## 2025-04-09 NOTE — TELEPHONE ENCOUNTER
Established patient  Chart prep for upcoming appointment.    Any pending/incomplete orders from last visit? No, all orders completed.  Was patient called and reminded to complete pending orders? N/A orders complete  Were any records requested?  No    Referral up to date? Yes  Referral attached to appointment (renewals and New patients only)? N/A (established with up-to-date referral)  Virtual appointment? No    Ashley Belle, Med Ass't  [facility]  Ph. [phone]  Fx. [phone]

## 2025-04-12 ENCOUNTER — HOSPITAL ENCOUNTER (OUTPATIENT)
Dept: LAB | Facility: MEDICAL CENTER | Age: 60
End: 2025-04-12
Attending: NURSE PRACTITIONER
Payer: COMMERCIAL

## 2025-04-12 DIAGNOSIS — E78.41 ELEVATED LIPOPROTEIN(A): ICD-10-CM

## 2025-04-12 DIAGNOSIS — E78.5 DYSLIPIDEMIA: ICD-10-CM

## 2025-04-12 LAB
ALBUMIN SERPL BCP-MCNC: 4 G/DL (ref 3.2–4.9)
ALBUMIN/GLOB SERPL: 1.5 G/DL
ALP SERPL-CCNC: 75 U/L (ref 30–99)
ALT SERPL-CCNC: 12 U/L (ref 2–50)
ANION GAP SERPL CALC-SCNC: 8 MMOL/L (ref 7–16)
AST SERPL-CCNC: 18 U/L (ref 12–45)
BILIRUB SERPL-MCNC: 0.2 MG/DL (ref 0.1–1.5)
BUN SERPL-MCNC: 13 MG/DL (ref 8–22)
CALCIUM ALBUM COR SERPL-MCNC: 9.3 MG/DL (ref 8.5–10.5)
CALCIUM SERPL-MCNC: 9.3 MG/DL (ref 8.5–10.5)
CHLORIDE SERPL-SCNC: 106 MMOL/L (ref 96–112)
CHOLEST SERPL-MCNC: 139 MG/DL (ref 100–199)
CO2 SERPL-SCNC: 26 MMOL/L (ref 20–33)
CREAT SERPL-MCNC: 0.69 MG/DL (ref 0.5–1.4)
FASTING STATUS PATIENT QL REPORTED: NORMAL
GFR SERPLBLD CREATININE-BSD FMLA CKD-EPI: 100 ML/MIN/1.73 M 2
GLOBULIN SER CALC-MCNC: 2.7 G/DL (ref 1.9–3.5)
GLUCOSE SERPL-MCNC: 76 MG/DL (ref 65–99)
HDLC SERPL-MCNC: 44 MG/DL
LDLC SERPL CALC-MCNC: 81 MG/DL
POTASSIUM SERPL-SCNC: 4.5 MMOL/L (ref 3.6–5.5)
PROT SERPL-MCNC: 6.7 G/DL (ref 6–8.2)
SODIUM SERPL-SCNC: 140 MMOL/L (ref 135–145)
TRIGL SERPL-MCNC: 71 MG/DL (ref 0–149)

## 2025-04-12 PROCEDURE — 80053 COMPREHEN METABOLIC PANEL: CPT

## 2025-04-12 PROCEDURE — 80061 LIPID PANEL: CPT

## 2025-04-12 PROCEDURE — 36415 COLL VENOUS BLD VENIPUNCTURE: CPT

## 2025-04-14 ENCOUNTER — RESULTS FOLLOW-UP (OUTPATIENT)
Dept: MEDICAL GROUP | Facility: LAB | Age: 60
End: 2025-04-14
Payer: COMMERCIAL

## 2025-04-17 ENCOUNTER — OFFICE VISIT (OUTPATIENT)
Dept: CARDIOLOGY | Facility: MEDICAL CENTER | Age: 60
End: 2025-04-17
Attending: INTERNAL MEDICINE
Payer: COMMERCIAL

## 2025-04-17 VITALS
BODY MASS INDEX: 28.34 KG/M2 | HEIGHT: 64 IN | DIASTOLIC BLOOD PRESSURE: 70 MMHG | HEART RATE: 66 BPM | SYSTOLIC BLOOD PRESSURE: 104 MMHG | WEIGHT: 166 LBS

## 2025-04-17 DIAGNOSIS — I73.00 RAYNAUD'S PHENOMENON WITHOUT GANGRENE: ICD-10-CM

## 2025-04-17 DIAGNOSIS — E78.5 DYSLIPIDEMIA: ICD-10-CM

## 2025-04-17 DIAGNOSIS — G89.29 CHRONIC RIGHT SHOULDER PAIN: ICD-10-CM

## 2025-04-17 DIAGNOSIS — M25.511 CHRONIC RIGHT SHOULDER PAIN: ICD-10-CM

## 2025-04-17 DIAGNOSIS — I34.0 MITRAL VALVE INSUFFICIENCY, UNSPECIFIED ETIOLOGY: ICD-10-CM

## 2025-04-17 DIAGNOSIS — E78.41 ELEVATED LIPOPROTEIN(A): ICD-10-CM

## 2025-04-17 DIAGNOSIS — I47.11 INAPPROPRIATE SINUS TACHYCARDIA (HCC): ICD-10-CM

## 2025-04-17 DIAGNOSIS — R06.09 DOE (DYSPNEA ON EXERTION): ICD-10-CM

## 2025-04-17 PROCEDURE — 3074F SYST BP LT 130 MM HG: CPT | Performed by: INTERNAL MEDICINE

## 2025-04-17 PROCEDURE — 99214 OFFICE O/P EST MOD 30 MIN: CPT | Performed by: INTERNAL MEDICINE

## 2025-04-17 PROCEDURE — 3078F DIAST BP <80 MM HG: CPT | Performed by: INTERNAL MEDICINE

## 2025-04-17 PROCEDURE — 99212 OFFICE O/P EST SF 10 MIN: CPT

## 2025-04-17 RX ORDER — ATORVASTATIN CALCIUM 80 MG/1
80 TABLET, FILM COATED ORAL NIGHTLY
Qty: 100 TABLET | Refills: 3 | Status: SHIPPED | OUTPATIENT
Start: 2025-04-17 | End: 2026-05-22

## 2025-04-17 ASSESSMENT — FIBROSIS 4 INDEX: FIB4 SCORE: 0.93

## 2025-04-17 NOTE — PROGRESS NOTES
[facility] - follow up VISIT   04/17/25    Nel Terry has been referred for evaluation and management of dyslipidemia  Referral Source: Anne Chicas A.P.R.N.   Date First Established in Clinic: Jan 2025    Subjective      CURRENT MED MGMT  Current Lipid Lowering Meds:   Statin: atorvastatin 20 mg -   Non-Statin: None  Supplements: None  Current adverse drug reactions/side effects? no  Adherence? complete    LIFESTYLE MGMT  Change in weight: lost about 30 pounds since starting semaglutide - back up about 5 since stopping GLP-1 in feb  Exercise habits: sporadic irregular exercise  Dietary patterns: portion controlled  Etoh: see sochx  Barriers to care/SDOH: none  Tobacco: quit many years ago    PERTINENT HLD PMHX  Age at Initial Diagnosis of Dyslipidemia: last few years  Baseline Lipids Prior to Treatment:   Lab Results   Component Value Date/Time    CHOLSTRLTOT 139 04/12/2025 08:38 AM    LDL 81 04/12/2025 08:38 AM    HDL 44 04/12/2025 08:38 AM    TRIGLYCERIDE 71 04/12/2025 08:38 AM       History of ASCVD: None  Other Established (non-atherosclerotic) Vascular Disease, if Present: None  Secondary causes of dyslipidemia:  Endocrine/Hypothyroidism:  none reported   Liver disease: none reported   Renal disease/nephrotic syndrome:  none reported  Dietary-induced (ketogenic, lean mass hyper-responder)? no  Medications: Estrogen  Previously Attempted Interventions for Lipids - including outcome  Statin: none     Outcome: not applicable  Non-Statin: none  Outcome: not applicable    She states her tachycardia is well controlled  Denies palpitations  Has some PANDEY, but thinks likely just out of shape    OTHER CARDIOVASCULAR RISK FACTORS  Antithrombotic therapy: No,  Blood pressure: no h/o htn  Dysglycemia: No     Family History   Problem Relation Age of Onset    Heart Disease Mother         58    Hypertension Mother     Hyperlipidemia Mother     Breast Cancer Maternal Aunt 30    Stomach Cancer Maternal Aunt  "    Cancer Paternal Grandmother     Breast Cancer Paternal Grandmother    Mom - premature CHD in mom in 50s  Dad - cabg 70s    Social History     Tobacco Use    Smoking status: Former     Average packs/day: 0.3 packs/day for 1 year (0.3 ttl pk-yrs)     Types: Cigarettes     Start date: 2003     Passive exposure: Never    Smokeless tobacco: Never   Vaping Use    Vaping status: Never Used   Substance Use Topics    Alcohol use: Yes     Alcohol/week: 0.6 oz     Types: 1 Standard drinks or equivalent per week     Comment: occ    Drug use: Not Currently         Objective    Vitals:    04/17/25 1342   BP: 104/70   BP Location: Left arm   Patient Position: Sitting   BP Cuff Size: Adult   Pulse: 66   Weight: 75.3 kg (166 lb)   Height: 1.626 m (5' 4\")      BMI Readings from Last 1 Encounters:   04/17/25 28.49 kg/m²      Wt Readings from Last 3 Encounters:   04/17/25 75.3 kg (166 lb)   04/02/25 75.1 kg (165 lb 9.6 oz)   02/05/25 73.8 kg (162 lb 12.8 oz)     BP Readings from Last 5 Encounters:   04/17/25 104/70   04/02/25 108/64   02/05/25 116/64   01/16/25 109/75   01/09/25 92/52     Physical Exam  Vitals reviewed.   Constitutional:       General: She is not in acute distress.     Appearance: She is not diaphoretic.   HENT:      Head: Normocephalic and atraumatic.   Eyes:      General: No scleral icterus.     Conjunctiva/sclera: Conjunctivae normal.   Neck:      Vascular: No carotid bruit.   Cardiovascular:      Rate and Rhythm: Normal rate and regular rhythm.      Heart sounds: Normal heart sounds. No murmur heard.  Pulmonary:      Effort: Pulmonary effort is normal. No respiratory distress.      Breath sounds: Normal breath sounds. No wheezing or rales.   Musculoskeletal:      Right lower leg: No edema.      Left lower leg: No edema.   Skin:     Coloration: Skin is not pale.   Neurological:      General: No focal deficit present.      Mental Status: She is alert and oriented to person, place, and time.      Cranial Nerves: " "No cranial nerve deficit.      Coordination: Coordination normal.      Gait: Gait is intact. Gait normal.   Psychiatric:         Mood and Affect: Mood and affect normal.         Behavior: Behavior normal.       DATA REVIEW:  Most Recent Lipid Panel:   Lab Results   Component Value Date/Time    CHOLSTRLTOT 139 04/12/2025 08:38 AM    LDL 81 04/12/2025 08:38 AM    HDL 44 04/12/2025 08:38 AM    TRIGLYCERIDE 71 04/12/2025 08:38 AM     Lab Results   Component Value Date/Time    LDL 81 04/12/2025 08:38 AM     (H) 01/18/2025 08:30 AM     (H) 04/02/2024 06:33 AM     (H) 03/28/2023 08:13 AM     (H) 03/01/2022 08:59 AM      Lab Results   Component Value Date/Time    LIPOPROTA 242 (H) 01/18/2025 08:30 AM      Lab Results   Component Value Date/Time    APOB 98 01/18/2025 08:30 AM      No results found for: \"CRPHIGHSEN\"    Other Pertinent Blood Work:   Lab Results   Component Value Date    SODIUM 140 04/12/2025    POTASSIUM 4.5 04/12/2025    CHLORIDE 106 04/12/2025    CO2 26 04/12/2025    ANION 8.0 04/12/2025    GLUCOSE 76 04/12/2025    BUN 13 04/12/2025    CREATININE 0.69 04/12/2025    CALCIUM 9.3 04/12/2025    ASTSGOT 18 04/12/2025    ALTSGPT 12 04/12/2025    ALKPHOSPHAT 75 04/12/2025    TBILIRUBIN 0.2 04/12/2025    ALBUMIN 4.0 04/12/2025    AGRATIO 1.5 04/12/2025    TSHULTRASEN 2.150 04/02/2024     Blood work from December 2024  Lipoprotein a 597 mcg/L  Glucose 78  , sodium 135, potassium 4.1, AST 14, ALT 9  Albumin in urine less than 2  CBC within normal limits  CRP 3.2  Small LDL particle #1596, small LDL particle #272, LDL pattern A, LDL peak size 219  Total cholesterol 209, HDL 46, LDL 41    VASCULAR IMAGING:    CAC Score: Zero (2022)    Echo 2022 - mild MR; unable to estimate pulmonary pressures    Echo march 2025  Normal left ventricular systolic function.  The left ventricular ejection fraction is visually estimated to be 55%.  Normal right ventricular size and systolic function.  Mild " "mitral annular calcification.  Moderate mitral regurgitation.  Mild tricuspid regurgitation.        ASSESSMENT AND PLAN  1. Inappropriate sinus tachycardia (HCC)  CBC WITHOUT DIFFERENTIAL      2. Mitral valve insufficiency, unspecified etiology        3. Dyslipidemia  atorvastatin (LIPITOR) 80 MG tablet    APOLIPOPROTEIN B    Lipid Profile      4. Elevated lipoprotein(a)        5. Raynaud's phenomenon without gangrene        6. PANDEY (dyspnea on exertion)  proBrain Natriuretic Peptide, NT        Patient Type, check all that apply: Primary Prevention    Major ASCVD events: None      Established (non-atherosclerotic) Vascular Disease:     # Valvular heart disease -mild MR on echocardiogram in 2022 - progressed to moderate in 2025. May be related to high lp(a). Doesn't sound like she is symptomatic, but hard to say for sure  - repeat echo in one year pending other recs from cards  - check bnp with next blood work    # Inappropriate sinus tachycardia -diagnosed by cardiology.  Continue metoprolol ER 25 mg a day as recommended by cardiology.  Otherwise defer further workup and management to cardiology    # Raynaud's phenomenon with positive LJ -symptoms seem relatively mild.  Avoid pharmacologic therapy at present.  Continue to avoid triggers.  We did talk about the risk of systemic sclerosis and/or pulmonary hypertension previously.  Pulmonary pressures normal on most recent echo - will need to follow. Defer to PCP as to whether or not to workup further for systemic sclerosis at this time    Secondary causes/contributors: HRT-see below    Evidence of genetic dyslipidemia: No FH but does have high lp(a)  FH genotyping: NO  -Recommended cascade family screening of fasting lipid panel and lipoprotein a - she states this is happening    ACC/AHA Indication for Statin Therapy:  None    ASCVD risk calculations  The 10-year ASCVD risk score (Emi DK, et al., 2019) is: 1.5%, <5% \"low risk\"    Other Significant Risk " Markers:  High-risk conditions: None   Risk-enhancers:   Famhx of premature ASCVD   Lipoprotein(a): Very High (>100 mg/dl or >225 nmol/L   CAC score - ZERO - 2022    Goal LDL-C and ApolipoproteinB/non-HDL-C:  LDL-C <100 mg/dl  apoB <90 mg/dl  At goals? yes    LIFESTYLE INTERVENTIONS  TOBACCO: Quit many years ago.  - continued complete avoidance of all tobacco products   PHYSICAL ACTIVITY: Add some resistance training to avoid sarcopenia  NUTRITION: Increase protein intake  ETOH: Continue to limit  WT MGMT: Patient is lost 30 pounds since starting on GLP-1.  Goal weight is around 140.  Continue slow steady weight loss    LIPID-LOWERING MEDICATION MANAGEMENT:     Statin Therapy:   -increase atorvastatin to 80 mg daily    Non-Statin Meds:   -Consider add-on medication as needed to get to goal  -consider lp(a) lowering meds in RCT (primary prevention) or when commercially available in future     Recommended Supplements: None     BLOOD PRESSURE MANAGEMENT:  ACC/AHA goal <130/80  Good control both in the office and at home  -Continue metoprolol ER 25 mg a day for inappropriate sinus tachycardia    GLYCEMIC STATUS: Normal  -Continue lifestyle modification    ANTITHROMBOTIC THERAPY - none currently given CAC score, but could consider low dose asa in future    OTHER    # HRT -as we discussed, estrogen may be prothrombotic in older patients.  Lipoprotein a may be prothrombotic as well and so I think this is a potentially worrisome combination.  Of asked her to talk to her PCP about potentially weaning off over time    # migraine headaches and subjective cognitive issues - seem no worse with addition of statin. Defer further w/u and management to neuro    Studies Ordered at Todays Visit: Echocardiogram march 2026 pending further recs from cards  Blood Work To Be Obtained Prior to Next Visit: cbc, bnp, apoB, lipid panel, cmp  Follow-Up: 6 months    Michael J Bloch, M.D.  ZULEIKA, Board-certified clinical lipidologist   Vascular  [facility]   [facility]   [phone]

## 2025-04-18 DIAGNOSIS — G89.29 CHRONIC RIGHT SHOULDER PAIN: ICD-10-CM

## 2025-04-18 DIAGNOSIS — M25.511 CHRONIC RIGHT SHOULDER PAIN: ICD-10-CM

## 2025-04-18 NOTE — TELEPHONE ENCOUNTER
Received request via: Patient    Was the patient seen in the last year in this department? Yes    Does the patient have an active prescription (recently filled or refills available) for medication(s) requested? No    Pharmacy Name: [facility]    Does the patient have alf Plus and need 100-day supply? (This applies to ALL medications) Patient does not have SCP

## 2025-04-21 RX ORDER — MELOXICAM 7.5 MG/1
7.5-15 TABLET ORAL DAILY
Qty: 30 TABLET | Refills: 1 | Status: SHIPPED | OUTPATIENT
Start: 2025-04-21

## 2025-04-24 ENCOUNTER — APPOINTMENT (OUTPATIENT)
Dept: MEDICAL GROUP | Facility: LAB | Age: 60
End: 2025-04-24
Payer: COMMERCIAL

## 2025-04-24 VITALS
BODY MASS INDEX: 28.31 KG/M2 | RESPIRATION RATE: 14 BRPM | HEART RATE: 64 BPM | OXYGEN SATURATION: 98 % | DIASTOLIC BLOOD PRESSURE: 56 MMHG | HEIGHT: 64 IN | SYSTOLIC BLOOD PRESSURE: 94 MMHG | WEIGHT: 165.8 LBS | TEMPERATURE: 98.3 F

## 2025-04-24 DIAGNOSIS — E66.3 OVERWEIGHT (BMI 25.0-29.9): ICD-10-CM

## 2025-04-24 DIAGNOSIS — R06.83 SNORING: ICD-10-CM

## 2025-04-24 DIAGNOSIS — Z00.00 WELLNESS EXAMINATION: ICD-10-CM

## 2025-04-24 DIAGNOSIS — R93.1 AGATSTON CAC SCORE, <100: ICD-10-CM

## 2025-04-24 DIAGNOSIS — E78.41 ELEVATED LIPOPROTEIN(A): ICD-10-CM

## 2025-04-24 DIAGNOSIS — Z12.31 ENCOUNTER FOR SCREENING MAMMOGRAM FOR BREAST CANCER: ICD-10-CM

## 2025-04-24 DIAGNOSIS — I47.11 INAPPROPRIATE SINUS TACHYCARDIA (HCC): ICD-10-CM

## 2025-04-24 DIAGNOSIS — E55.9 VITAMIN D DEFICIENCY: ICD-10-CM

## 2025-04-24 DIAGNOSIS — R73.03 PREDIABETES: ICD-10-CM

## 2025-04-24 DIAGNOSIS — E78.5 DYSLIPIDEMIA: ICD-10-CM

## 2025-04-24 DIAGNOSIS — G47.9 SLEEP DISTURBANCE: ICD-10-CM

## 2025-04-24 DIAGNOSIS — R41.89 BRAIN FOG: ICD-10-CM

## 2025-04-24 DIAGNOSIS — Z79.890 HORMONE REPLACEMENT THERAPY: ICD-10-CM

## 2025-04-24 PROBLEM — N95.0 POST-MENOPAUSAL BLEEDING: Status: RESOLVED | Noted: 2023-06-02 | Resolved: 2025-04-24

## 2025-04-24 PROCEDURE — 3078F DIAST BP <80 MM HG: CPT | Performed by: NURSE PRACTITIONER

## 2025-04-24 PROCEDURE — 3074F SYST BP LT 130 MM HG: CPT | Performed by: NURSE PRACTITIONER

## 2025-04-24 PROCEDURE — 99396 PREV VISIT EST AGE 40-64: CPT | Performed by: NURSE PRACTITIONER

## 2025-04-24 RX ORDER — TIRZEPATIDE 2.5 MG/.5ML
2.5 INJECTION, SOLUTION SUBCUTANEOUS
Qty: 2 ML | Refills: 0 | Status: SHIPPED | OUTPATIENT
Start: 2025-04-24 | End: 2025-05-22

## 2025-04-24 RX ORDER — TRAZODONE HYDROCHLORIDE 50 MG/1
50 TABLET ORAL NIGHTLY
Qty: 90 TABLET | Refills: 3 | Status: SHIPPED | OUTPATIENT
Start: 2025-04-24

## 2025-04-24 ASSESSMENT — FIBROSIS 4 INDEX: FIB4 SCORE: 0.93

## 2025-04-24 NOTE — PROGRESS NOTES
Subjective:     CC:   Chief Complaint   Patient presents with    Annual Wellness Visit     HPI:   [place] presents today with the following:    Wellness Examination     - Dyslipidemia (30-45): reviewed  - Diabetes (HTN, HLD, BMI >25): reviewed  - Depression screening (PHQ-2 and/or PHQ-9): 0  - Osteoporosis: Ca intake, DEXA (>65 or with risk factors): n/a  - If fx, needs BMD test or tx w/i 6 months of dx  - Dental: sees dentist regularly  - Eye: sees optometrist regularly  AAA Screening (Once in men 65-75 years who have ever smoked): n/a  Diet: balanced  Exercise: regular  Substance Use: none  Tobacco Use/counseling: n/a  Safe in relationship: yes  Seat belts and gun safety discussed.  Sun protection used.     Cancer screening  - Colon CA (45-75) - FIT (annual) cspy (q10yr): 2034  - Lung CA (50-81y/o w/20py smoking hx & currently smoke or quit w/i past 15 yrs): n/a  - Prostate Cancer Screening/PSA: n/a  - Cervical CA (21-65): due 5/2025  -  HX Abnormal pap/HPV: none  - Breast CA: mammo (required 50-73yo) or starting 40 (ACOG, ACR), 45 (ACS), 50 (USPTF): due  - Skin cancer screening: followed by dermatology     Infectious disease screening/Immunizations  --STI/HIV Screening: declines  --Practices safe sex.  --Hepatitis C Screening (18 to 80 yo): n/a  --Immunizations:               Influenza: UTD   Covid-19: UTD              Tetanus: UTD              Shingles: UTD    Dyslipidemia  Chronic condition. Reviewed labs.  Followed by cardiology.  Currently taking atorvastatin 80 mg nightly.  The 10-year ASCVD risk score (Emi DK, et al., 2019) is: 1.3%.  Denies chest pain, shortness of breath, heart palpitations.    Lab Results   Component Value Date/Time    CHOLSTRLTOT 139 04/12/2025 08:38 AM    LDL 81 04/12/2025 08:38 AM    HDL 44 04/12/2025 08:38 AM    TRIGLYCERIDE 71 04/12/2025 08:38 AM         Hormone replacement therapy  We discussed the risks, benefits alternatives to hormone replacement therapy.  Patient wants to  "continue taking Climara 0.075 mg and progesterone 200 mg to relieve menopausal symptoms.    Inappropriate sinus tachycardia  This is a chronic condition. Patient currently taking metoprolol SR 25 mg daily.  Followed by cardiology.  Denies chest pain, palpitations, shortness of breath.     Overweight (BMI 25.0-29.9)  At visit height 5'4\" and weight 165 pounds yielding BMI 28.46. Goal weight 140 pounds.  Has been taking Wegovy, but insurance stopped covering this. Has lost quite a bit of weight since starting the medication, would like to continue a GLP-1 medication and is interested in Zepbound through [facility].  Educated on normal BMI.    Educated on lifestyle changes/portion control/plant based diet/adequate hydration.  May be beneficial to use food tracker ronan/food journal.  Consider dietician evaluation/weight management program. Patient agrees that dietician/weight management program may be beneficial.  Comorbid conditions: prediabetes    Prediabetes  A1c history:   Lab Results   Component Value Date/Time    HBA1C 5.8 (H) 04/02/2024 06:33 AM    HBA1C 5.8 (H) 10/03/2023 02:09 PM    HBA1C 6.0 (H) 03/28/2023 08:13 AM    HBA1C 5.7 (H) 03/01/2022 08:59 AM     Currently working on lifestyle modifications including diet and exercise. Denies any unexplained weight loss, polyuria, polydipsia.     Sleep disturbance  Reports that she often wakes in the night and finds it hard to go back to sleep.  She does snore, thinks that she might stop breathing when she is sleeping.  Fatigue during the day and experiencing brain fog.    STOPBAN - Sleep Apnea Screening      Flowsheet Row Most Recent Value   S - Have you been told that you SNORE? Yes   T - Are you often TIRED during the day? Yes   O - Do you know if you stop breathing or has anyone witnessed you stop breathing while you were asleep? (OBSTRUCTION) Yes   P - Do you have high blood PRESSURE or on medication to control high blood pressure? Yes   B - Is your Body Mass " "Index greater than 35? (BMI) No   A - Are you 50 years old or older? (AGE) Yes   N - Are you a male with a NECK circumference greater than 17 inches, or a female with a neck circumference greater than 16 inches? No   G - Are you male? (GENDER) No   STOPBANG Total Score 5   EVELYN Risk High Risk          ROS:   Gen: no fevers/chills, no changes in weight  Eyes: no changes in vision  ENT: no sore throat, no hearing loss, no bloody nose  Pulm: no sob, no cough  CV: no chest pain, no palpitations  GI: no nausea/vomiting, no diarrhea  : no dysuria  MSk: no myalgias  Skin: no rash  Neuro: no headaches, no numbness/tingling  Heme/Lymph: no easy bruising        - NOTE: All other systems reviewed and are negative, except as in HPI.    Objective:     Exam: BP 94/56 (BP Location: Right arm, Patient Position: Sitting, BP Cuff Size: Adult)   Pulse 64   Temp 36.8 °C (98.3 °F)   Resp 14   Ht 1.626 m (5' 4\")   Wt 75.2 kg (165 lb 12.8 oz)   SpO2 98%  Body mass index is 28.46 kg/m².    General: Normal appearing. No distress.  HEENT: Normocephalic. Eyes conjunctiva clear lids without ptosis, pupils equal and reactive to light accommodation, ears normal shape and contour, canals are clear bilaterally, tympanic membranes are benign, nasal mucosa benign, oropharynx is without erythema, edema or exudates.   Neck: Supple without JVD or bruit. Thyroid is not enlarged.  Pulmonary: Clear to ausculation.  Normal effort. No rales, ronchi, or wheezing.  Cardiovascular: Regular rate and rhythm without murmur. Carotid and radial pulses are intact and equal bilaterally.  Neurologic: Grossly nonfocal  Lymph: No cervical or supraclavicular lymph nodes are palpable  Skin: Warm and dry.  No obvious lesions.  Musculoskeletal: Normal gait. No extremity cyanosis, clubbing, or edema.  Psych: Normal mood and affect. Alert and oriented x3. Judgment and insight is normal.    Assessment & Plan:     59 y.o. female with the following -     1. Wellness " examination  This is a 59-year-old female here today for a preventative exam.  Previous medical history, healthcare maintenance and immunization status reviewed.  Patient is up to date.  Annual labwork ordered.  See discussion of anticipatory guidance below.  Patient will return annually for preventative exams.    Dentist and eye doctor   Labs per orders.    Anticipatory guidance  Counseling about diet, supplements, exercise, skin care.    2. Snoring  Sleep study ordered. Discussed sleep positioning, alcohol avoidance, and alternative therapies. Emphasized importance of maintaining good sleep hygiene including: no caffeine after 3pm, no napping, using bedroom only for sleep or sex, no TV or phones before bed. Continue to monitor.  - Polysomnography, 4 or More; Future  - Referral to Pulmonary and Sleep Medicine    3. Sleep disturbance  4. Brain fog  Discussed benefits, risks and alternatives to medication.  Patient to take medication as prescribed. Side effects of medication prescribed today were discussed with the patient including how to take the medication and proper dosage. Discussed repercussions of not taking the medication as prescribed. Instructed to call the office should she have any negative side effects or problems with the medication. Emphasized importance of maintaining good sleep hygiene including: no caffeine after 3pm, no napping, using bedroom only for sleep or sex, no TV or phones before bed.  - Polysomnography, 4 or More; Future  - Referral to Pulmonary and Sleep Medicine  - traZODone (DESYREL) 50 MG Tab; Take 1 Tablet by mouth every evening.  Dispense: 90 Tablet; Refill: 3    5. Overweight (BMI 25.0-29.9)  BMI was assessed today and reviewed with patient.  Willingness to change as well as barriers were assessed. A collaborative plan was made with the patient and goals were set. Assistance was discussed, including self-help and more formal medical adjunctive treatments.  Patient to take medication  as prescribed.  Side effects of medication prescribed today were discussed with the patient including how to take the medication and proper dosage. Discussed repercussions of not taking the medication as prescribed. Instructed to call the office should she have any negative side effects or problems with the medication.  Recommend healthy low-carb diet, 30-minutes of moderate exercise daily and avoiding sugars and high-fat foods.    - Tirzepatide-Weight Management (ZEPBOUND) 2.5 MG/0.5ML Solution vial; Inject 0.5 mL under the skin every 7 days for 28 days.  Dispense: 2 mL; Refill: 0    6. Vitamin D deficiency  Continue vitamin D supplementation. Recommended taking an over-the-counter vitamin D supplement daily (800-1000 IU).     7. Prediabetes  Chronic condition. Recommend to reduce sugar/carbohydrate/alcohol, eat more vegetables and lean meats such as fish/chicken/turkey. Recommend 30 minutes of cardiovascular exercise most days of the week.  - HEMOGLOBIN A1C; Future  - Comp Metabolic Panel; Future    8. Dyslipidemia  Chronic condition. Labs as indicated.  Continue statin medication and lifestyle modifications.    9. Elevated lipoprotein(a)  Chronic condition. Labs as indicated.  Continue statin medication and lifestyle modifications.    10. Agatston CAC score, <100  Labs as indicated.  Continue statin medication and lifestyle modifications.    11. Inappropriate sinus tachycardia (HCC)  Chronic, stable condition. Patient to continue medication as prescribed. Continue to follow up with cardiology.   - TSH WITH REFLEX TO FT4; Future  - Comp Metabolic Panel; Future    12. Hormone replacement therapy  Chronic condition. Discussion about possible risks of estrogen/progesterone HRT to include heart disease and stroke. Possible increased risk of breast cancer as well. HRT can have side effects including weight gain, bloating, breast tenderness or swelling, nausea, headaches, leg cramping, and vaginal bleeding.  HRT can  prevent osteoporosis. After thorough discussion, patient elects to continue HRT.    13. Encounter for screening mammogram for breast cancer  Mammogram ordered.  - MA-SCREENING MAMMO BILAT W/TOMOSYNTHESIS W/CAD; Future

## 2025-04-24 NOTE — ASSESSMENT & PLAN NOTE
Reports that she often wakes in the night and finds it hard to go back to sleep.  She does snore, thinks that she might stop breathing when she is sleeping.  Fatigue during the day and experiencing brain fog.    STOPBANG - Sleep Apnea Screening      Flowsheet Row Most Recent Value   S - Have you been told that you SNORE? Yes   T - Are you often TIRED during the day? Yes   O - Do you know if you stop breathing or has anyone witnessed you stop breathing while you were asleep? (OBSTRUCTION) Yes   P - Do you have high blood PRESSURE or on medication to control high blood pressure? Yes   B - Is your Body Mass Index greater than 35? (BMI) No   A - Are you 50 years old or older? (AGE) Yes   N - Are you a male with a NECK circumference greater than 17 inches, or a female with a neck circumference greater than 16 inches? No   G - Are you male? (GENDER) No   STOPBANG Total Score 5   EVELYN Risk High Risk

## 2025-04-24 NOTE — ASSESSMENT & PLAN NOTE
We discussed the risks, benefits alternatives to hormone replacement therapy.  Patient wants to continue taking Climara 0.075 mg and progesterone 200 mg to relieve menopausal symptoms.

## 2025-04-24 NOTE — PATIENT INSTRUCTIONS
SLEEP STUDY INSTRUCTIONS    1. Our main concern is to provide the best test and evaluation of your sleep and your cooperation in following the guidelines is very necessary.    2. We have no facilities for family members or guests at the sleep center. Special arrangements will be made for children requiring overnight sleep studies.    3. Unless otherwise instructed, AVOID alcoholic beverages on the day of your sleep study.    4. DO NOT drink coffee or caffeine-containing beverages after 12:00 noon on the day of your sleep study.    5. There is NO smoking at the sleep center.    6. Try to maintain a usual daytime schedule prior to the study (avoid unusual physical activity or meals).    7. DO NOT take a nap on the day of your study.    8. This is an outpatient procedure (test); therefore, nursing services, medications, and meals ARE NOT provided. If you take medications, bring them with you and take them on the schedule you do at home.    9. Please fill your sleep aid prescription (Ambien or Lunesta) and bring to your sleep study. Even patients who normally have no problem going to sleep often need a sleep aid in this different environment.    10. We ask that you wear conventional sleep attire (pajamas or sweats) for the sleep study. We discourage patients from wearing only their underwear to bed. We recommend two-piece pajamas as the techs will need to apply sensors to your stomach.    11. Please shampoo your hair the day of the sleep study. Please DO NOT use any other hair or skin products before your arrival (e.g., mousse, gel, hair or body spray, perfume, body lotion etc.) NOTE: Women should not wear heavy makeup prior to arrival as some wires are taped to the face area.    12. The technician will be applying several small electrodes to the scalp, eye area, chin, chest, and legs, plus respiratory effort belts around the chest. Also, there will be a device placed directly under the nose. (THIS WILL NOT OBSTRUCT  YOUR BREATHING.) This is a painless procedure and the skin is not broken.    13. The test is generally completed in six to eight hours; We are usually done between 6 - 7 a.m., unless you are scheduled for a nap study. You may need to come back another night for a second study to complete your treatment plan.    14. Patients who are scheduled for an MSLT (nap study) will stay at the sleep center for the day following their nighttime study. You will be notified if a nap study was ordered for you at the time the night study is scheduled. Generally, patients having a nap study will leave the sleep center by 4 p.m.    15. You will need to bring food for the following day if you are scheduled for a nap study. A refrigerator and microwave are available.    16. A bathroom is available for your use.    17. We are able to adjust the room temperature for your comfort. Please let the technologist know if you are uncomfortable during the study.    18. If you sleep better with a special pillow or stuffed animal, you may bring it along. Service animals are the only live animals permitted.    19. Cable T.V. is available.    20. You will be scheduled for a follow-up appointment three to five days after the sleep study to review your results.    21. A copy of your sleep study is sent to the referring physician approximately two weeks after your study.    22. Any questions can be directed to our staff at [phone].    23. If CPAP therapy is applied, a home unit will be ordered for you through the [employer] medical equipment company. You will be contacted to schedule delivery after insurance authorization.

## 2025-04-24 NOTE — ASSESSMENT & PLAN NOTE
"At visit height 5'4\" and weight 165 pounds yielding BMI 28.46. Goal weight 140 pounds.  Has been taking Wegovy, but insurance stopped covering this. Has lost quite a bit of weight since starting the medication, would like to continue a GLP-1 medication and is interested in Zepbound through [employer].  Educated on normal BMI.    Educated on lifestyle changes/portion control/plant based diet/adequate hydration.  May be beneficial to use food tracker ronan/food journal.  Consider dietician evaluation/weight management program. Patient agrees that dietician/weight management program may be beneficial.  Comorbid conditions: prediabetes  " Pt was able to stand at bedside and use the urinal. Pt reports slight dizziness when sitting but resolves and pt is able to move without additional assistance.

## 2025-04-24 NOTE — ASSESSMENT & PLAN NOTE
A1c history:   Lab Results   Component Value Date/Time    HBA1C 5.8 (H) 04/02/2024 06:33 AM    HBA1C 5.8 (H) 10/03/2023 02:09 PM    HBA1C 6.0 (H) 03/28/2023 08:13 AM    HBA1C 5.7 (H) 03/01/2022 08:59 AM     Currently working on lifestyle modifications including diet and exercise. Denies any unexplained weight loss, polyuria, polydipsia.

## 2025-04-24 NOTE — ASSESSMENT & PLAN NOTE
Chronic condition. Reviewed labs.  Followed by cardiology.  Currently taking atorvastatin 80 mg nightly.  The 10-year ASCVD risk score (Emi FRANKEL, et al., 2019) is: 1.3%.  Denies chest pain, shortness of breath, heart palpitations.    Lab Results   Component Value Date/Time    CHOLSTRLTOT 139 04/12/2025 08:38 AM    LDL 81 04/12/2025 08:38 AM    HDL 44 04/12/2025 08:38 AM    TRIGLYCERIDE 71 04/12/2025 08:38 AM

## 2025-04-24 NOTE — ASSESSMENT & PLAN NOTE
This is a chronic condition. Patient currently taking metoprolol SR 25 mg daily.  Followed by cardiology.  Denies chest pain, palpitations, shortness of breath.

## 2025-04-28 ENCOUNTER — APPOINTMENT (OUTPATIENT)
Dept: RADIOLOGY | Facility: MEDICAL CENTER | Age: 60
End: 2025-04-28
Attending: NURSE PRACTITIONER
Payer: COMMERCIAL

## 2025-05-01 NOTE — Clinical Note
REFERRAL APPROVAL NOTICE         Sent on May 1, 2025                   Nel Terry  [address] [place] [address]                   Dear Ms. Terry,    After a careful review of the medical information and benefit coverage, [facility] has processed your referral. See below for additional details.    If applicable, you must be actively enrolled with your insurance for coverage of the authorized service. If you have any questions regarding your coverage, please contact your insurance directly.    REFERRAL INFORMATION   Referral #:  [id number]  Referred-To Department    Referred-By Provider:  Pulmonary and Sleep Medicine    DANITA Mendez   Pulmonary/sleep [facility]      [address]  [address]  [place] [id number] [phone] [address]  [place] [address] [phone]    Referral Start Date:  04/24/2025  Referral End Date:   04/24/2026           SCHEDULING  If you do not already have an appointment, please call [phone] to make an appointment.   MORE INFORMATION  As a reminder, [facility] ownership has changed, meaning this location is now owned and operated by [facility]. As such, we want to clarify that our patients should expect to receive two separate bills for the services received at [facility] - one representing the [facility] facility fees as the owner of the establishment, and the other to represent the physician's services and subsequent fees. You can speak with your insurance carrier for a pricing estimate by calling the customer service number on the back of your card and ask about charges for a hospital outpatient visit.  If you do not already have a [employer] account, sign up at: [email or web address]  You can access your medical information, make appointments, see lab results, billing  information, and more.  If you have questions regarding this referral, please contact  the [facility] department at:             [phone]. Monday - Friday 7:30AM - 5:00PM.      Sincerely,  [facility]

## 2025-05-14 DIAGNOSIS — R06.83 SNORING: Primary | ICD-10-CM

## 2025-05-14 DIAGNOSIS — G47.9 SLEEP DISTURBANCE: ICD-10-CM

## 2025-05-14 DIAGNOSIS — R41.89 BRAIN FOG: ICD-10-CM

## 2025-05-15 DIAGNOSIS — E66.3 OVERWEIGHT (BMI 25.0-29.9): ICD-10-CM

## 2025-05-15 RX ORDER — TIRZEPATIDE 2.5 MG/.5ML
2.5 INJECTION, SOLUTION SUBCUTANEOUS
Qty: 2 ML | Refills: 0 | Status: SHIPPED | OUTPATIENT
Start: 2025-05-15 | End: 2025-05-19

## 2025-05-16 ENCOUNTER — PATIENT MESSAGE (OUTPATIENT)
Dept: MEDICAL GROUP | Facility: LAB | Age: 60
End: 2025-05-16
Payer: COMMERCIAL

## 2025-05-16 DIAGNOSIS — E66.3 OVERWEIGHT (BMI 25.0-29.9): Primary | ICD-10-CM

## 2025-05-16 NOTE — Clinical Note
REFERRAL APPROVAL NOTICE         Sent on May 16, 2025                   Nel Terry  [address] [place] [address]                   Dear MsBerenice Terry,    After a careful review of the medical information and benefit coverage, [facility] has processed your referral. See below for additional details.    If applicable, you must be actively enrolled with your insurance for coverage of the authorized service. If you have any questions regarding your coverage, please contact your insurance directly.    REFERRAL INFORMATION   Referral #:  [id number]  Referred-To Department    Referred-By Provider:  Pulmonary and Sleep Medicine    DANITA Mendez   P[facility] Sleep [facility]      [address]  [place] [address]  [phone] [address]  [place] [id number] [phone]    Referral Start Date:  05/14/2025  Referral End Date:   05/14/2026             SCHEDULING  If you do not already have an appointment, please call [phone] to make an appointment.     MORE INFORMATION  If you do not already have a [employer] account, sign up at: [email or web address]  You can access your medical information, make appointments, see lab results, billing information, and more.  If you have questions regarding this referral, please contact  the [facility] Referrals department at:             [phone]. Monday - Friday 8:00AM - 5:00PM.     Sincerely,    [facility]

## 2025-05-19 ENCOUNTER — PATIENT MESSAGE (OUTPATIENT)
Dept: MEDICAL GROUP | Facility: LAB | Age: 60
End: 2025-05-19
Payer: COMMERCIAL

## 2025-05-19 ENCOUNTER — TELEPHONE (OUTPATIENT)
Dept: HEALTH INFORMATION MANAGEMENT | Facility: OTHER | Age: 60
End: 2025-05-19
Payer: COMMERCIAL

## 2025-05-19 DIAGNOSIS — E66.3 OVERWEIGHT (BMI 25.0-29.9): ICD-10-CM

## 2025-05-19 RX ORDER — TIRZEPATIDE 5 MG/.5ML
5 INJECTION, SOLUTION SUBCUTANEOUS
Qty: 2 ML | Refills: 0 | Status: SHIPPED | OUTPATIENT
Start: 2025-05-19 | End: 2025-06-16

## 2025-05-19 RX ORDER — TIRZEPATIDE 5 MG/.5ML
5 INJECTION, SOLUTION SUBCUTANEOUS
Qty: 2 ML | Refills: 0 | Status: SHIPPED | OUTPATIENT
Start: 2025-05-19 | End: 2025-05-19 | Stop reason: SDUPTHER

## 2025-05-29 ENCOUNTER — APPOINTMENT (OUTPATIENT)
Dept: RADIOLOGY | Facility: MEDICAL CENTER | Age: 60
End: 2025-05-29
Attending: NURSE PRACTITIONER
Payer: COMMERCIAL

## 2025-05-29 DIAGNOSIS — Z12.31 ENCOUNTER FOR SCREENING MAMMOGRAM FOR BREAST CANCER: ICD-10-CM

## 2025-05-29 PROCEDURE — 77063 BREAST TOMOSYNTHESIS BI: CPT

## 2025-06-02 ENCOUNTER — RESULTS FOLLOW-UP (OUTPATIENT)
Dept: MEDICAL GROUP | Facility: LAB | Age: 60
End: 2025-06-02
Payer: COMMERCIAL

## 2025-06-06 DIAGNOSIS — E66.3 OVERWEIGHT (BMI 25.0-29.9): ICD-10-CM

## 2025-06-09 RX ORDER — TIRZEPATIDE 5 MG/.5ML
5 INJECTION, SOLUTION SUBCUTANEOUS
Qty: 2 ML | Refills: 0 | Status: SHIPPED | OUTPATIENT
Start: 2025-06-09 | End: 2025-06-30

## 2025-06-10 ENCOUNTER — APPOINTMENT (OUTPATIENT)
Dept: SLEEP MEDICINE | Facility: MEDICAL CENTER | Age: 60
End: 2025-06-10
Attending: NURSE PRACTITIONER
Payer: COMMERCIAL

## 2025-06-10 ENCOUNTER — APPOINTMENT (OUTPATIENT)
Dept: MEDICAL GROUP | Facility: LAB | Age: 60
End: 2025-06-10
Payer: COMMERCIAL

## 2025-06-10 DIAGNOSIS — R06.83 SNORING: ICD-10-CM

## 2025-06-10 DIAGNOSIS — R41.89 BRAIN FOG: ICD-10-CM

## 2025-06-10 DIAGNOSIS — G47.9 SLEEP DISTURBANCE: ICD-10-CM

## 2025-06-10 PROCEDURE — 95800 SLP STDY UNATTENDED: CPT | Performed by: STUDENT IN AN ORGANIZED HEALTH CARE EDUCATION/TRAINING PROGRAM

## 2025-06-11 ENCOUNTER — OFFICE VISIT (OUTPATIENT)
Dept: MEDICAL GROUP | Facility: LAB | Age: 60
End: 2025-06-11
Payer: COMMERCIAL

## 2025-06-11 ENCOUNTER — HOSPITAL ENCOUNTER (OUTPATIENT)
Facility: MEDICAL CENTER | Age: 60
End: 2025-06-11
Attending: NURSE PRACTITIONER
Payer: COMMERCIAL

## 2025-06-11 VITALS
RESPIRATION RATE: 14 BRPM | BODY MASS INDEX: 27.62 KG/M2 | HEART RATE: 54 BPM | SYSTOLIC BLOOD PRESSURE: 108 MMHG | OXYGEN SATURATION: 98 % | DIASTOLIC BLOOD PRESSURE: 52 MMHG | WEIGHT: 161.8 LBS | TEMPERATURE: 98 F | HEIGHT: 64 IN

## 2025-06-11 DIAGNOSIS — Z12.4 SCREENING FOR CERVICAL CANCER: ICD-10-CM

## 2025-06-11 DIAGNOSIS — N81.9 FEMALE GENITAL PROLAPSE, UNSPECIFIED TYPE: ICD-10-CM

## 2025-06-11 DIAGNOSIS — G47.9 SLEEP DISTURBANCE: ICD-10-CM

## 2025-06-11 DIAGNOSIS — Z01.419 WELL WOMAN EXAM: Primary | ICD-10-CM

## 2025-06-11 PROCEDURE — 88142 CYTOPATH C/V THIN LAYER: CPT

## 2025-06-11 PROCEDURE — 87624 HPV HI-RISK TYP POOLED RSLT: CPT

## 2025-06-11 PROCEDURE — 3074F SYST BP LT 130 MM HG: CPT | Performed by: NURSE PRACTITIONER

## 2025-06-11 PROCEDURE — 3078F DIAST BP <80 MM HG: CPT | Performed by: NURSE PRACTITIONER

## 2025-06-11 PROCEDURE — 99396 PREV VISIT EST AGE 40-64: CPT | Performed by: NURSE PRACTITIONER

## 2025-06-11 RX ORDER — TRAZODONE HYDROCHLORIDE 100 MG/1
100 TABLET ORAL NIGHTLY
Qty: 90 TABLET | Refills: 3 | Status: SHIPPED | OUTPATIENT
Start: 2025-06-11

## 2025-06-11 ASSESSMENT — FIBROSIS 4 INDEX: FIB4 SCORE: 0.93

## 2025-06-11 NOTE — PROGRESS NOTES
SUBJECTIVE:   Chief Complaint   Patient presents with    Gynecologic Exam       59 y.o. female for annual routine gynecologic exam    OB History    Para Term  AB Living   3 2 2 0 1 2   SAB IAB Ectopic Molar Multiple Live Births   1 0 0 0 0 2      Social History     Substance and Sexual Activity   Sexual Activity Not Currently    Partners: Male    Birth control/protection: Post-Menopausal       Last Pap:   H/O Abnormal Pap yes  She has been menopausal since age: 51 .    She has utilized HRT.    Currently taking: Climara patch weekly and progesterone 200 mg nightly  Her last Mammogram was done:  2025  Reports mild menopause symptoms of hot flashes, night sweats, sleep disruption, mood changes, vaginal dryness.   No significant bloating/fluid retention, pelvic pain, or dyspareunia. No vaginal discharge   She does not perform regular self breast exams.  No breast tenderness, mass, nipple discharge, changes in size or contour, or abnormal cyclic discomfort    ROS:    No urinary tract symptoms, no incontinence.   No abdominal pain, change in bowel habits, black or bloody stools.    No unusual headaches, no visual changes, menstrual migraines   No prolonged cough. No dyspnea or chest pain on exertion.  No depression, labile mood, anxiety ,libido changes, insomnia.  No new/concerning skin lesions, concerns.     Exercise: moderate regular exercise program   Diet: balanced    Social History[1]    Patient Active Problem List    Diagnosis Date Noted    Snoring 2025    Sleep disturbance 2025    Right rotator cuff tear 2025    Tendonitis of long head of biceps brachii of right shoulder 2025    Superior labrum anterior-to-posterior (SLAP) tear of right shoulder 2025    Impingement of right shoulder 2025    Elevated lipoprotein(a) 2025    Agatston CAC score, <100 2025    Trigger finger, right middle finger 2024    Dupuytren's contracture of right hand  2024    Trigger finger, right index finger 2024    Degenerative arthritis of left knee 2024    Endocervical polyp 2023    OAB (overactive bladder) 2023    Urge urinary incontinence 2023    History of suburethral sling procedure 2023    Atrophic vaginitis 2023    Prediabetes 2023    Dyslipidemia 2023    Mitral valve prolapse 2023    Moderate mitral regurgitation by prior echocardiogram 2023    Medial meniscus tear 2023    Primary osteoarthritis of first carpometacarpal joint of right hand 2023    History of diverticulitis 2022    Hormone replacement therapy 2021    Inappropriate sinus tachycardia (HCC) 2021    Anxiety 2019    Vitamin D deficiency 2019    Menopausal symptoms 2019    Antinuclear factor positive 10/19/2017    Raynaud's phenomenon without gangrene 10/19/2017    Overweight (BMI 25.0-29.9) 2014    Osteopenia 2012       Past Medical History[2]  Past Surgical History[3]      Family History   Problem Relation Age of Onset    Heart Disease Mother         58    Hypertension Mother     Hyperlipidemia Mother     Breast Cancer Maternal Aunt 30    Stomach Cancer Maternal Aunt     Cancer Paternal Grandmother     Breast Cancer Paternal Grandmother      Family Status   Relation Name Status    Mo      MAunt  (Not Specified)    MAunt  Alive    MUnc  (Not Specified)    PGMo  (Not Specified)   No partnership data on file         Current medicines (including changes today)  Current Medications[4]        Allergies: Hydrocodone-acetaminophen     Preventive Care:  Health Maintenance Topics with due status: Overdue       Topic Date Due    Pneumococcal Vaccine: 50+ Years 2016    Cervical Cancer Screening 2025       OBJECTIVE:   /52 (BP Location: Right arm, Patient Position: Sitting, BP Cuff Size: Adult)   Pulse (!) 54   Temp 36.7 °C (98 °F)   Resp 14   Ht 1.626 m (5'  "4\")   Wt 73.4 kg (161 lb 12.8 oz)   SpO2 98%   BMI 27.77 kg/m²   Body mass index is 27.77 kg/m².  A chaperone was offered to the patient during today's exam. Patient declined chaperone.    Gen: Well developed, well nourished in no acute distress.   Skin: Pink, warm, and dry  HEENT: conjunctiva non-injected, sclera non-icteric. EOMs intact.   Nasal mucosa without edema nor erythema. No facial tenderness  Pinna normal. TM pearly gray.   Oral mucous membranes pink and moist with no lesions.  Neck: Supple, trachea midline. No adenopathy or masses in the neck or supraclavicular regions.  Lungs: Effort is normal. Clear to auscultation bilaterally with good excursion.  CV: regular rate and rhythm.  Abdomen: soft, nontender, + BS. No HSM.  No CVAT  Ext: no edema, color normal, vascularity normal, temperature normal  Alert and oriented Eye contact is good, speech goal directed, affect calm  Pelvic Exam:  Normal external genitalia with no lesions. Vaginal mucosa with normal rugation and scant discharge. Cervix with no visible lesions. No cervical motion tenderness. Pap is obtained and the specimen was sent to lab    <ASSESSMENT and PLAN>  1. Well woman exam        2. Screening for cervical cancer  THINPREP PAP WITH HPV      3. Female genital prolapse, unspecified type  Referral to Urogynecology        Anticipatory Guidance  Discussed  breast self exam, mammography screening, use and side effects of HRT, menopause, osteoporosis, adequate intake of calcium and vitamin D, diet and exercise, Kegel exercises.      Follow-up in 1 years for next Gyn exam and 3 years for next Pap.   Return in about 1 year (around 6/11/2026).    This dictation was created using voice recognition software. The accuracy of the dictation is limited to the abilities of the software. I expect there may be some errors of grammar and possibly content. The MA notes were reviewed and certain aspects of this information were incorporated into this note.     "     [1]   Social History  Tobacco Use    Smoking status: Former     Average packs/day: 0.3 packs/day for 1 year (0.3 ttl pk-yrs)     Types: Cigarettes     Start date: 2003     Passive exposure: Never    Smokeless tobacco: Never   Vaping Use    Vaping status: Never Used   Substance Use Topics    Alcohol use: Yes     Alcohol/week: 0.6 oz     Types: 1 Standard drinks or equivalent per week     Comment: occ    Drug use: Not Currently   [2]   Past Medical History:  Diagnosis Date    Arrhythmia     tachy    Arthritis     Diabetes (HCC) 10/02/2023    prediabetic    Diverticulitis     Female stress incontinence 06/29/2015    Gynecological disorder     High cholesterol     not medicated    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    Migraine 03/07/2012    Plantar fasciitis, bilateral 02/21/2020    Last Assessment & Plan:  Formatting of this note might be different from the original. - Discussed conservative measures of foot  exercise, rolling chilled bottles under arches of feet, NSAID use PRN.  - Can consider night splints, therapeutic injections with persistent sxs - Return to clinic PRN for worsening or persistent sxs.    Sleep apnea     not using cpap, had surgery   [3]   Past Surgical History:  Procedure Laterality Date    PB TOTAL KNEE ARTHROPLASTY Left 7/17/2024    Procedure: LEFT TOTAL KNEE ARTHROPLASTY;  Surgeon: Sky Fitzgerald M.D.;  Location: [facility];  Service: Orthopedics    PB REPAIR INTERCARP/CARP-METACARP JT Right 4/11/2024    Procedure: RIGHT WRIST CARPOMETACARPAL JOINT ARTHROPLASTY;  Surgeon: Riog Seymour M.D.;  Location: [facility];  Service: Orthopedics    PB TRANSPLANT FOREARM/WRIST TENDON Right 4/11/2024    Procedure: RIGHT THUMB FLEXOR CARPI RADIALIS TENDON TRANSFER;  Surgeon: Rigo Seymour M.D.;  Location: [facility];  Service: Orthopedics    UT INS/RPL PRPH SAC/GSTR NPG/R N/A 10/25/2023    Procedure: SACRAL NEUROMODULATION INSERTION OF  NEUROSTIMULATOR ELECTRODE ARRAY, PLACEMENT OF IMPLANTED PULSE GENERATOR;  Surgeon: Edward Bailey M.D.;  Location: SURGERY [facility];  Service: Gynecology    OR PERCUT IMPLANT,NEUROELEC,SACRAL NERVE  10/9/2023    Procedure: SACRAL NEUROMODULATION INSERTION OF TEMPORARY NEUROSTIMULATOR ELECTRODE ARRAY;  Surgeon: Edward Bailey M.D.;  Location: SURGERY SAME DAY [facility];  Service: Gynecology    HYSTEROSCOPY WITH MYOSURE  6/2/2023    Procedure: HYSTEROSCOPY DILATION AND CURETTAGE;  Surgeon: Nohemy Cody D.O.;  Location: SURGERY [facility];  Service: Gynecology    PB KNEE SCOPE,MED/LAT MENISECTOMY Left 4/6/2023    Procedure: LEFT KNEE ARTHROSCOPY, LEFT PARTIAL MEDIAL MENISCECTOMY, REPAIRS AS INDICATED;  Surgeon: Logan Rice M.D.;  Location: [facility];  Service: Orthopedics    GYN SURGERY      Bladder sling    OTHER      Sinus surg    OTHER ORTHOPEDIC SURGERY      Rt rotator cuff repair    SINUSOTOMIES     [4]   Current Outpatient Medications   Medication Sig Dispense Refill    ZEPBOUND 5 MG/0.5ML Solution vial INJECT 0.5 ML (5 MG) UNDER THE SKIN ONCE WEEKLY (0.5ML= 50 UNITS) 2 mL 0    traZODone (DESYREL) 50 MG Tab Take 1 Tablet by mouth every evening. 90 Tablet 3    meloxicam (MOBIC) 7.5 MG Tab TAKE 1 TO 2 TABLETS BY MOUTH EVERY DAY 30 Tablet 1    meloxicam (MOBIC) 7.5 MG Tab Take 1-2 Tablets by mouth every day. 30 Tablet 1    atorvastatin (LIPITOR) 80 MG tablet Take 1 Tablet by mouth every evening. 100 Tablet 3    SUMAtriptan (IMITREX) 50 MG Tab Take 1 Tablet by mouth one time as needed for Migraine for up to 1 dose. 9 Tablet 3    metoprolol SR (TOPROL XL) 25 MG TABLET SR 24 HR Take 1 Tablet by mouth every day. 90 Tablet 3    progesterone (PROMETRIUM) 200 MG capsule TAKE 1 CAPSULE BY MOUTH EVERY EVENING 90 Capsule 3    estradiol (CLIMARA) 0.075 MG/24HR PATCH WEEKLY APPLY 1 PATCH TOPICALLY TO THE SKIN EVERY 7 DAYS 12 Patch 3    fluticasone (FLONASE ALLERGY RELIEF) 50 MCG/ACT nasal  spray 0      diphenhydrAMINE (BENADRYL) 25 MG Tab Take 25 mg by mouth at bedtime as needed (for sleep).       No current facility-administered medications for this visit.

## 2025-06-17 NOTE — PROCEDURES
DIAGNOSTIC HOME SLEEP TEST (HST) REPORT WatchPAT      PATIENT ID:  NAME:  Nel Terry  MRN:               [id number]  YOB: 1965  DATE OF STUDY: 6/10/2025      Impression:     This study shows evidence of:      1.  Borderline obstructive sleep apnea with PAT apnea hypopnea index(3% pAHI) of 4.1 per hour.  PAT respiratory disturbance index (pRDI) was 9.1 per hour. These findings are based on 7 channels recording of PAT signal with sleep staging, heart rate, pulse oximetry, actigraphy, body position, snoring and respiratory movement.     2. Oxygenation O2 Sat. mean O2 sat was 90%,  caleb was 87%,  and maximum O2 at 96%. O2 sat was at or  below 88% for 1.1 min of evaluation time. Oxygen Desaturation (>=4%) Index was 1.1/hr. AVG HR was 73 BPM.      TECHNICAL DESCRIPTION: Patient underwent home sleep apnea testing with peripheral arterial tone signal (WatchPAT™). This is a Type IV portable monitor and device per Medicare. Monitoring was done with 7 channels recording of PAT signal with sleep staging, heart rate, pulse oximetry, actigraphy, body position, snoring and respiratory movement. Prior to using the device, the patient received verbal and written instructions for its application and was provided with the help desk phone number for additional telephonic instruction with 24-hour availability of qualified personnel to answer questions.    Respiratory events:      General sleep summary: . Total recording time is 7 hours and 40 minutes and total Sleep time is 7 hours and 22 minutes. The patient spent 360 minutes in the supine position and 82.5 minutes in the nonsupine position.      Recommendations:    1. Patient is borderline for obstructive sleep apnea based on elevated RDI with normal AHI. Would recommend to further assess for sleep disordered breathing with diagnostic PSG if patient remains symptomatic.    2. In general patients with sleep apnea are advised to avoid alcohol, sedatives  and not to operate a motor vehicle while drowsy. Untreated sleep apnea increases the risk for cardiovascular and neurovascular disease.            Irma Whitmore MD

## 2025-06-18 ENCOUNTER — RESULTS FOLLOW-UP (OUTPATIENT)
Dept: MEDICAL GROUP | Facility: LAB | Age: 60
End: 2025-06-18
Payer: COMMERCIAL

## 2025-06-18 LAB
HPV I/H RISK 1 DNA SPEC QL NAA+PROBE: NOT DETECTED
SPECIMEN SOURCE: NORMAL
THINPREP PAP, CYTOLOGY NL11781: NORMAL

## 2025-06-18 NOTE — Clinical Note
REFERRAL APPROVAL NOTICE         Sent on June 18, 2025                   Nel Terry  [address] [place] [address]                   Dear Ms. Terry,    After a careful review of the medical information and benefit coverage, [facility] has processed your referral. See below for additional details.    If applicable, you must be actively enrolled with your insurance for coverage of the authorized service. If you have any questions regarding your coverage, please contact your insurance directly.    REFERRAL INFORMATION   Referral #:  [id number]  Referred-To Department    Referred-By Provider:  Urogynecology    DANITA Mendezm [facility]      [address]  [address]  [place] [id number] [phone] [address]  [place] [address] [phone]    Referral Start Date:  06/11/2025  Referral End Date:   06/11/2026             SCHEDULING  If you do not already have an appointment, please call [phone] to make an appointment.     MORE INFORMATION  If you do not already have a [employer] account, sign up at: [email or web address]  You can access your medical information, make appointments, see lab results, billing information, and more.  If you have questions regarding this referral, please contact  the [facility] Referrals department at:             [phone]. Monday - Friday 8:00AM - 5:00PM.     Sincerely,    [facility]

## 2025-06-19 ENCOUNTER — RESULTS FOLLOW-UP (OUTPATIENT)
Dept: MEDICAL GROUP | Facility: LAB | Age: 60
End: 2025-06-19
Payer: COMMERCIAL

## 2025-06-23 ENCOUNTER — OFFICE VISIT (OUTPATIENT)
Dept: SLEEP MEDICINE | Facility: MEDICAL CENTER | Age: 60
End: 2025-06-23
Attending: PHYSICIAN ASSISTANT
Payer: COMMERCIAL

## 2025-06-23 VITALS
OXYGEN SATURATION: 92 % | HEART RATE: 70 BPM | RESPIRATION RATE: 16 BRPM | WEIGHT: 160 LBS | BODY MASS INDEX: 27.31 KG/M2 | DIASTOLIC BLOOD PRESSURE: 64 MMHG | SYSTOLIC BLOOD PRESSURE: 116 MMHG | HEIGHT: 64 IN

## 2025-06-23 DIAGNOSIS — R06.83 SNORING: Primary | ICD-10-CM

## 2025-06-23 DIAGNOSIS — G47.9 SLEEP DISTURBANCE: ICD-10-CM

## 2025-06-23 PROCEDURE — 99213 OFFICE O/P EST LOW 20 MIN: CPT | Performed by: PHYSICIAN ASSISTANT

## 2025-06-23 PROCEDURE — 3074F SYST BP LT 130 MM HG: CPT | Performed by: PHYSICIAN ASSISTANT

## 2025-06-23 PROCEDURE — 3078F DIAST BP <80 MM HG: CPT | Performed by: PHYSICIAN ASSISTANT

## 2025-06-23 PROCEDURE — 99214 OFFICE O/P EST MOD 30 MIN: CPT | Performed by: PHYSICIAN ASSISTANT

## 2025-06-23 ASSESSMENT — ENCOUNTER SYMPTOMS
SORE THROAT: 0
CHILLS: 0
DIZZINESS: 1
PALPITATIONS: 1
FEVER: 0
SPUTUM PRODUCTION: 0
COUGH: 0
SHORTNESS OF BREATH: 0
WEIGHT LOSS: 0
WHEEZING: 0
TREMORS: 0
SINUS PAIN: 0
HEADACHES: 0
HEARTBURN: 1
INSOMNIA: 1
ORTHOPNEA: 0

## 2025-06-23 ASSESSMENT — FIBROSIS 4 INDEX: FIB4 SCORE: 0.93

## 2025-06-23 NOTE — PROGRESS NOTES
"Chief Complaint   Patient presents with    Apnea     Ref by  ТАТЬЯНА Mendez. Dx: R06.83 (ICD-10-CM) - Snoring  G47.9 (ICD-10-CM) - Sleep disturbance    Sleep study complete on 6/10/25    Overnight Home Sleep Study       HPI:  Nel Terry is a 59 y.o. year old female here today for follow-up on sleep study results and establish with [facility].  Patient is referred by Anne CAR.    Past Medical History: Sleep disorder breathing, anxiety, vitamin D deficiency, hormone replacement therapy, Raynaud's phenomenon, inappropriate sinus tachycardia, diverticulitis, moderate mitral regurgitation, mitral valve prolapse.  Patient with previous history of sleep apnea diagnosed in 2003 and on therapy at that time.  No treatment since 2009 following sinus surgery.    Vitals:  /64 (BP Location: Left arm, Patient Position: Sitting, BP Cuff Size: Adult)   Pulse 70   Resp 16   Ht 1.626 m (5' 4\")   Wt 72.6 kg (160 lb)   SpO2 92% BMI of 27.46 kg/m².  Patient reports weight is down 35 pounds on Zepbound at 5 mg/week.    Recent Imaging: Echocardiogram obtained 3/25/2025 demonstrating normal left ventricular chamber size, wall thickness, systolic and diastolic function.  LVEF estimated 55%.  Normal right ventricular size and systolic function.  Mild mitral annular calcification with moderate mitral regurgitation.  Mild tricuspid regurgitation, unable to estimate RVSP due to inadequate tricuspid regurgitant jet.    Overnight home sleep study obtained 6/10/2025 demonstrating borderline EVELYN with 4.1 events per hour and p RDI consistent with mild obstructive sleep apnea.  Low O2 sat of 87% with sats less than or equal to 88% for 1.1 minutes of evaluated time.  Further evaluation is recommended with diagnostic in-lab study if patient is symptomatic.  Symptoms specifically include difficulty falling asleep for which she takes trazodone at 100 mg, difficulty maintaining sleep, fatigue, brain fog, loss " "of focus, memory issues, snoring, teeth grinding, and morning headaches.  Patient reports ability to work is being impacted.    Stop Bang Score 5 (4/24/2025  8:18 AM)     As per supplemental questionnaire to be scanned or imported into chart:    Berlin Sleepiness Score: 4    Sleep Schedule  Bedtime: Weekday 7:30 PM weekend 830-930 p.m.  Wake time: Weekday 4:30 AM weekend 6 AM  Sleep-onset latency: 45 minutes  Awakenings from sleep: 2-3  Difficulty falling back asleep: No  Bedroom partner: No  Naps: No    DAYTIME SYMPTOMS:   Excessive daytime sleepiness: No  Daytime fatigue: Yes  Difficulty concentrating: Yes  Memory problems: Yes  Irritability: Yes  Work/school performance issues: Yes  Sleepiness with driving: No  Caffeine/stimulant use: Yes  Alcohol use: No     SLEEP RELATED SYMPTOMS  Snoring: Yes  Witnessed apnea or gasping/choking: Yes  Dry mouth or mouth breathing: Yes  Night Sweating: No  Teeth grinding/biting: Yes  Morning headaches: Yes  Refreshed Upon Awakening: No     SLEEP RELATED BEHAVIORS:  Parasomnias (walking, talking, eating, violence): No  Leg kicking: No  Restless legs - \"urge to move\": Yes  Nightmares: No  Recurrent: No   Dream enactment: No      NARCOLEPSY:  Cataplexy: No   Sleep paralysis: No   Sleep attacks: No   Hypnagogic/hypnopompic hallucinations: No      Review of Systems   Constitutional:  Positive for malaise/fatigue. Negative for chills, fever and weight loss.   HENT:  Positive for congestion (allergies) and hearing loss. Negative for nosebleeds, sinus pain, sore throat and tinnitus.    Eyes:         Presc glasses    Respiratory:  Negative for cough, sputum production, shortness of breath and wheezing.    Cardiovascular:  Positive for palpitations (metropolol) and leg swelling (trace). Negative for chest pain and orthopnea.   Gastrointestinal:  Positive for heartburn.        No dentures, missing 6-7 teeth, no swallowing issues    Neurological:  Positive for dizziness (off and on last 6 " months). Negative for tremors and headaches.   Psychiatric/Behavioral:  The patient has insomnia (falling asleep then wakes up 1-2 x).        Past Medical History[1]    Past Surgical History[2]    Family History   Problem Relation Age of Onset    Heart Disease Mother         58    Hypertension Mother     Hyperlipidemia Mother     Breast Cancer Maternal Aunt 30    Stomach Cancer Maternal Aunt     Cancer Paternal Grandmother     Breast Cancer Paternal Grandmother        Social History     Socioeconomic History    Marital status: Single     Spouse name: Not on file    Number of children: Not on file    Years of education: Not on file    Highest education level: Not on file   Occupational History    Not on file   Tobacco Use    Smoking status: Former     Average packs/day: 0.3 packs/day for 1 year (0.3 ttl pk-yrs)     Types: Cigarettes     Start date: 2003     Passive exposure: Never    Smokeless tobacco: Never   Vaping Use    Vaping status: Never Used   Substance and Sexual Activity    Alcohol use: Yes     Alcohol/week: 0.6 oz     Types: 1 Standard drinks or equivalent per week     Comment: occ    Drug use: Not Currently    Sexual activity: Not Currently     Partners: Male     Birth control/protection: Post-Menopausal   Other Topics Concern    Not on file   Social History Narrative    Not on file     Social Drivers of Health     Financial Resource Strain: Not on file   Food Insecurity: Not on file   Transportation Needs: Not on file   Physical Activity: Not on file   Stress: Not on file   Social Connections: Not on file   Intimate Partner Violence: Not on file   Housing Stability: Not on file       Allergies as of 06/23/2025 - Reviewed 06/23/2025   Allergen Reaction Noted    Hydrocodone-acetaminophen  01/23/2024          Current medications as of today Current Medications[3]      Physical Exam:   Gen:           Alert and oriented, No apparent distress. Mood and affect appropriate, normal interaction with examiner.    Hearing:     Grossly intact.  Nose:          Normal, no lesions or deformities.  Dentition:    Fair dentition.   Oropharynx:   Tongue normal, posterior pharynx without erythema or exudate.  Mallampati Classification: II  Neck:        Supple, trachea midline, no masses.  Respiratory Effort: No intercostal retractions or use of accessory muscles.   Gait and Station: Normal.  Digits and Nails: No clubbing, cyanosis, petechiae, or nodes.   Skin:        No rashes, lesions or ulcers noted.               Ext:           No cyanosis or edema.      Immunizations:  Flu: 9/3/2024  Pneumovax 23: 10/18/2013  PCV 20: Recommended  SARS CoV2 Vaccine: 9/3/2024, 12/26/2023, 9/18/2022, 9/28/2021, 3/10/2021, 2/16/2021    Assessment / Plan:  1. Snoring  - Polysomnography, 4 or More    2. Sleep disturbance  - Polysomnography, 4 or More    Reviewed sleep study results, incomplete occlusive bed suggesting mild sleep apnea.  Discrepancy noted between borderline AHI and RDI.  Previous diagnosis of EVELYN.  Patient with significant symptoms including brain fog, loss of focus, teeth grinding, night sweats, snoring, morning headaches.  Recommendation for in-lab split-night study to better evaluate.  Discussion regarding potential treatment options.  Short-term follow-up to review in-lab study results.  Patient states understanding.    Follow-up:   Return in about 3 months (around 9/23/2025) for Return with Susy Palmer PA-C.    Please note that this dictation was created using voice recognition software. I have made every reasonable attempt to correct obvious errors, but it is possible there are errors of grammar and possibly content that I did not discover before finalizing the note.         [1]   Past Medical History:  Diagnosis Date    Arrhythmia     tachy    Arthritis     Diabetes (HCC) 10/02/2023    prediabetic    Diverticulitis     Female stress incontinence 06/29/2015    Gynecological disorder     High cholesterol     not medicated     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    Migraine 03/07/2012    Plantar fasciitis, bilateral 02/21/2020    Last Assessment & Plan:  Formatting of this note might be different from the original. - Discussed conservative measures of foot  exercise, rolling chilled bottles under arches of feet, NSAID use PRN.  - Can consider night splints, therapeutic injections with persistent sxs - Return to clinic PRN for worsening or persistent sxs.    Sleep apnea     not using cpap, had surgery   [2]   Past Surgical History:  Procedure Laterality Date    PB TOTAL KNEE ARTHROPLASTY Left 7/17/2024    Procedure: LEFT TOTAL KNEE ARTHROPLASTY;  Surgeon: Sky Fitzgerald M.D.;  Location: [facility];  Service: Orthopedics    PB REPAIR INTERCARP/CARP-METACARP JT Right 4/11/2024    Procedure: RIGHT WRIST CARPOMETACARPAL JOINT ARTHROPLASTY;  Surgeon: Rigo Seymour M.D.;  Location: [facility];  Service: Orthopedics    PB TRANSPLANT FOREARM/WRIST TENDON Right 4/11/2024    Procedure: RIGHT THUMB FLEXOR CARPI RADIALIS TENDON TRANSFER;  Surgeon: Rigo Seymour M.D.;  Location: [facility];  Service: Orthopedics    MT INS/RPL PRPH SAC/GSTR NPG/R N/A 10/25/2023    Procedure: SACRAL NEUROMODULATION INSERTION OF NEUROSTIMULATOR ELECTRODE ARRAY, PLACEMENT OF IMPLANTED PULSE GENERATOR;  Surgeon: Edward Bailey M.D.;  Location: SURGERY [facility];  Service: Gynecology    MT PERCUT IMPLANT,NEUROELEC,SACRAL NERVE  10/9/2023    Procedure: SACRAL NEUROMODULATION INSERTION OF TEMPORARY NEUROSTIMULATOR ELECTRODE ARRAY;  Surgeon: Edwadr Bailey M.D.;  Location: SURGERY SAME DAY [facility];  Service: Gynecology    HYSTEROSCOPY WITH MYOSURE  6/2/2023    Procedure: HYSTEROSCOPY DILATION AND CURETTAGE;  Surgeon: Nohemy Cody D.O.;  Location: SURGERY [facility];  Service: Gynecology    PB KNEE SCOPE,MED/LAT MENISECTOMY Left 4/6/2023    Procedure: LEFT KNEE ARTHROSCOPY, LEFT PARTIAL MEDIAL  MENISCECTOMY, REPAIRS AS INDICATED;  Surgeon: Logan Rice M.D.;  Location: [facility] [place];  Service: Orthopedics    GYN SURGERY      Bladder sling    OTHER      Sinus surg    OTHER ORTHOPEDIC SURGERY      Rt rotator cuff repair    SINUSOTOMIES     [3]   Current Outpatient Medications   Medication Sig Dispense Refill    traZODone (DESYREL) 100 MG Tab Take 1 Tablet by mouth every evening. 90 Tablet 3    ZEPBOUND 5 MG/0.5ML Solution vial INJECT 0.5 ML (5 MG) UNDER THE SKIN ONCE WEEKLY (0.5ML= 50 UNITS) 2 mL 0    meloxicam (MOBIC) 7.5 MG Tab TAKE 1 TO 2 TABLETS BY MOUTH EVERY DAY 30 Tablet 1    meloxicam (MOBIC) 7.5 MG Tab Take 1-2 Tablets by mouth every day. 30 Tablet 1    atorvastatin (LIPITOR) 80 MG tablet Take 1 Tablet by mouth every evening. 100 Tablet 3    SUMAtriptan (IMITREX) 50 MG Tab Take 1 Tablet by mouth one time as needed for Migraine for up to 1 dose. 9 Tablet 3    metoprolol SR (TOPROL XL) 25 MG TABLET SR 24 HR Take 1 Tablet by mouth every day. 90 Tablet 3    progesterone (PROMETRIUM) 200 MG capsule TAKE 1 CAPSULE BY MOUTH EVERY EVENING 90 Capsule 3    estradiol (CLIMARA) 0.075 MG/24HR PATCH WEEKLY APPLY 1 PATCH TOPICALLY TO THE SKIN EVERY 7 DAYS 12 Patch 3    fluticasone (FLONASE ALLERGY RELIEF) 50 MCG/ACT nasal spray 0      diphenhydrAMINE (BENADRYL) 25 MG Tab Take 25 mg by mouth at bedtime as needed (for sleep).       No current facility-administered medications for this visit.

## 2025-06-23 NOTE — PATIENT INSTRUCTIONS
1-reviewed sleep study results, inconclusive but suggestive of mild sleep apnea  2-discrepancy between borderline AHI and RDI   3-significant symptoms associated with sleep apnea including brain fog, loss of focus, teeth grinding, night sweats, snoring and morning headaches  4-previous diagnosis of sleep apnea on cpap years ago  5-recommendation for in lab diagnostic/split night study to better evaluate   6-discussion regarding treatment options   7-short term follow up to review results

## 2025-06-30 DIAGNOSIS — E66.3 OVERWEIGHT (BMI 25.0-29.9): ICD-10-CM

## 2025-06-30 RX ORDER — TIRZEPATIDE 5 MG/.5ML
5 INJECTION, SOLUTION SUBCUTANEOUS
Qty: 2 ML | Refills: 0 | Status: SHIPPED | OUTPATIENT
Start: 2025-06-30

## 2025-06-30 NOTE — PROGRESS NOTES
4Urogynecology and Pelvic Reconstructive Surgery Follow Up    Nel Terry MRN:[id number] :1965    Referred by: Nohemy Cody DO    Reason for Visit:   Chief Complaint   Patient presents with    Follow-Up         Subjective     History of Presenting Illness:    Nel Terry is a 59 y.o. year old P2 with OAB/urge urinary incontinence who presents for follow-up.    She specifically follows up today due to worsening prolapse symptoms, with protrusion at the vaginal opening and difficulty emptying her rectum.    She underwent neuromodulation in , and is still overall doing well with this.  She still has occasional urge leaks throughout the day but this is much improved from prior symptoms before stimulator.  She is also now starting to note some occasional stress incontinence symptoms.    She denies any postmenopausal bleeding.  Pap NILM/HPV negative in     She is currently recovering from shoulder surgery and previously underwent knee surgery    Initial HPI: She was referred by Dr. Cody for the evaluation and management of urinary incontinence.      She has had a sling previously but her current urine leakage is mostly with urgency. SHMUEL improced after sling.      She has urinary frequency and if she doesn't run she will leak.      She was just seen on Tuesday for work-up of postmenopausal bleeding by Dr. Cody, status post pelvic ultrasound.  She is on HRT with patch and Prometrium.  She is a thin endometrial stripe on ultrasound and has had a discussion with Dr. Cody and a plan for hysteroscopy for full evaluation and diagnosis, especially given endocervical polyps       Prior Pelvic surgery:   10/2017: sling for incontinence ([facility]) - reports no skin incisions, likely single incision sling     Prior treatment:   Oral medication including gemtesa  10/25/2023: Sacral neuromodulation ([facility])  Pelvic PT      Fluid intake:   6 cups water  2 cups coffee   2 cups  tea    Pelvic floor symptom review:                   Bladder:   Voids per day: 12-15 Voids per night: 2      Urinary incontinence episodes per day: 3-4 --> morning leak, better than before stimulator   Urge leakage:  On Movement to Bathroom and Full Bladder   Stress leakage: None   Continuous / insensible urine loss: No    Nocturnal enuresis: No    Leakage volume: Drops to moderate   Number of pads/day: 1    Bladder emptying: Complete   Voiding symptoms: Hesitancy, Weak Stream, and Double or Triple Voiding   UTI in last 12 months: no   Other urologic history: no      Prolapse:     Prolapse symptoms: None     Bowel:    No bowel isseus, h/o diverticulitis      Pelvic Pain: no      Past medical and surgical history      Past medical history:  Past Medical History:   Diagnosis Date    Diabetes (HCC) 10/02/2023    prediabetic    Plantar fasciitis, bilateral 02/21/2020    Last Assessment & Plan:  Formatting of this note might be different from the original. - Discussed conservative measures of foot  exercise, rolling chilled bottles under arches of feet, NSAID use PRN.  - Can consider night splints, therapeutic injections with persistent sxs - Return to clinic PRN for worsening or persistent sxs.    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    Female stress incontinence 06/29/2015    Migraine 03/07/2012    Arrhythmia     tachy    Arthritis     Diverticulitis     Gynecological disorder     High cholesterol     not medicated    Sleep apnea     not using cpap, had surgery     Past surgical history:  Past Surgical History:   Procedure Laterality Date    PB SHLDR ARTHROSCOP,SURG,W/ROTAT CUFF REPB Right 6/27/2025    Procedure: RIGHT SHOULDER ARTHROSCOPY, ROTATOR CUFF REPAIR, RIGHT SUBACROMIAL DECOMPRESSION, RIGHT EXTENSIVE JOINT DEBRIDEMENT, RIGHT BICEP TENODESIS, REPAIRS AS INDICATED;  Surgeon: Maxim Simeon M.D.;  Location: [facility];  Service: Orthopedics    PB ARTHROSCOPY SHOULDER SURGICAL BICEPS  TENODES* Right 6/27/2025    Procedure: RIGHT SHOULDER ARTHROSCOPY, ROTATOR CUFF REPAIR, RIGHT SUBACROMIAL DECOMPRESSION, RIGHT EXTENSIVE JOINT DEBRIDEMENT, RIGHT BICEP TENODESIS, REPAIRS AS INDICATED;  Surgeon: Maxim Simeon M.D.;  Location: [facility];  Service: Orthopedics    MA SHLDR ARTHROSCOP EXTEN DEBRIDE 3+ Right 6/27/2025    Procedure: RIGHT SHOULDER ARTHROSCOPY, ROTATOR CUFF REPAIR, RIGHT SUBACROMIAL DECOMPRESSION, RIGHT EXTENSIVE JOINT DEBRIDEMENT, RIGHT BICEP TENODESIS, REPAIRS AS INDICATED;  Surgeon: Maxim Simeon M.D.;  Location: [facility];  Service: Orthopedics    PB TOTAL KNEE ARTHROPLASTY Left 7/17/2024    Procedure: LEFT TOTAL KNEE ARTHROPLASTY;  Surgeon: Sky Fitzgerald M.D.;  Location: [facility];  Service: Orthopedics    PB REPAIR INTERCARP/CARP-METACARP JT Right 4/11/2024    Procedure: RIGHT WRIST CARPOMETACARPAL JOINT ARTHROPLASTY;  Surgeon: Rigo Seymour M.D.;  Location: [facility];  Service: Orthopedics    PB TRANSPLANT FOREARM/WRIST TENDON Right 4/11/2024    Procedure: RIGHT THUMB FLEXOR CARPI RADIALIS TENDON TRANSFER;  Surgeon: Rigo Seymour M.D.;  Location: [facility];  Service: Orthopedics    MA INS/RPL PRPH SAC/GSTR NPG/R N/A 10/25/2023    Procedure: SACRAL NEUROMODULATION INSERTION OF NEUROSTIMULATOR ELECTRODE ARRAY, PLACEMENT OF IMPLANTED PULSE GENERATOR;  Surgeon: Edward Bailey M.D.;  Location: SURGERY [facility];  Service: Gynecology    MA PERCUT IMPLANT,NEUROELEC,SACRAL NERVE  10/9/2023    Procedure: SACRAL NEUROMODULATION INSERTION OF TEMPORARY NEUROSTIMULATOR ELECTRODE ARRAY;  Surgeon: Edward Bailey M.D.;  Location: SURGERY SAME DAY [facility];  Service: Gynecology    HYSTEROSCOPY WITH MYOSURE  6/2/2023    Procedure: HYSTEROSCOPY DILATION AND CURETTAGE;  Surgeon: Nohemy Cody D.O.;  Location: SURGERY [facility];  Service: Gynecology    PB KNEE SCOPE,MED/LAT MENISECTOMY Left  "4/6/2023    Procedure: LEFT KNEE ARTHROSCOPY, LEFT PARTIAL MEDIAL MENISCECTOMY, REPAIRS AS INDICATED;  Surgeon: Logan Rice M.D.;  Location: [facility];  Service: Orthopedics    GYN SURGERY      Bladder sling    OTHER      Sinus surg    OTHER ORTHOPEDIC SURGERY      Rt rotator cuff repair    SINUSOTOMIES       Medications:has a current medication list which includes the following prescription(s): zepbound, celecoxib, oxycodone immediate-release, acetaminophen, trazodone, meloxicam, meloxicam, atorvastatin, sumatriptan, metoprolol sr, progesterone, estradiol, fluticasone, and diphenhydramine.  Allergies:Hydrocodone-acetaminophen  Family history:  Family History   Problem Relation Age of Onset    Heart Disease Mother         58    Hypertension Mother     Hyperlipidemia Mother     Breast Cancer Maternal Aunt 30    Stomach Cancer Maternal Aunt     Cancer Paternal Grandmother     Breast Cancer Paternal Grandmother      Social history: reports that she has quit smoking. Her smoking use included cigarettes. She started smoking about 22 years ago. She has a 0.3 pack-year smoking history. She has never been exposed to tobacco smoke. She has never used smokeless tobacco. She reports current alcohol use of about 1.2 oz of alcohol per week. She reports that she does not use drugs.    Review of systems: A full review of systems was performed, and negative with the exception of want is noted above in the HPI.        Objective        Pelvic floor:    POP-Q: Aa -1.5 / Ba -1.5 / TVL 11 / C -7.5 / D -8 / Ap 0 / Bp 0 / GH 3.5 / PB 2   Prolapse stage: 2   Paravaginal defect: mild bilateral   Cervical elongation: No    Urethral tenderness: No    Bladder/ suprapubic tenderness: No    Levator tenderness: None   Levator muscle tone: WNL         BP 95/58 (BP Location: Left arm, Patient Position: Sitting, BP Cuff Size: Adult)   Pulse 60   Ht 5' 4\"   Wt 165 lb   BMI 28.32 kg/m²     Physical Exam  Vitals " reviewed. Exam conducted with a chaperone present.   Constitutional:       Appearance: Normal appearance.   HENT:      Head: Normocephalic.      Mouth/Throat:      Mouth: Mucous membranes are moist.   Cardiovascular:      Rate and Rhythm: Normal rate.   Pulmonary:      Effort: Pulmonary effort is normal.   Skin:     General: Skin is warm and dry.   Neurological:      Mental Status: She is alert.   Psychiatric:         Mood and Affect: Mood normal.         Procedure Performed: Reprogramming.  Program #2 boosted 2 levels until she felt stimulation in the ipsilateral vaginal saddle region    Diagnostic test and records review:     Post-void residual: (prior) 33 mL, performed by Bladder Scanner    Labs: n/a    Procedural:      Urodynamics 8/1/23:   Filling phase: Normal capacity, normal compliance, no stress incontinence noted, no uninhibited detrusor contractions  Voiding phase: Complete emptying via detrusor contraction on pressure flow despite inability to empty on initial uroflow which may be secondary to cystoscopy and urethral numbing     Cystoscopy 8/1/23:   Small <3mm lesion (unclear if varicocity or mass) in left posterior  dome. Cytology sent, neg. Urothelium otherwise normal, no mesh or diverticula.     Radiology:     Pelvic ultrasound 5/5/2023:  FINDINGS:  Both transabdominal and transvaginal scanning were performed to optimally visualize the pelvis.  UTERUS  The uterus measures 3.70 cm x 6.88 cm x 4.48 cm.  The uterine myometrium is within normal limits.  The endometrial echo complex measures 0.41 cm.  The endometrium is unremarkable in appearance and thickness for age and menstrual status. There is a cervical nabothian cyst.   OVARIES:  The right ovary measures 1.89 cm x 1.91 cm x 1.81 cm. Duplex Doppler examination of the right ovary shows normal waveforms.  The left ovary measures 3.59 cm x 1.14 cm x 2.62 cm. Duplex Doppler examination of the left ovary shows normal waveformsThere is a small amount of  fluid anterior to the uterus which is nonspecific  IMPRESSION:  1.  No acute findings.  2.  Small amount of free pelvic fluid, a nonspecific finding.    Documentation reviewed: Prior EMR Records           Assessment & Plan     Nel Terry is a 59 y.o. year old P2 with rectocele and incontinence    Rectocele  Outlet dysfunction constipation  She has symptomatic pelvic organ prolapse, posterior predominant.  In most cases, this is not a dangerous condition that necessitates treatment in all patients, but treatment is offered when it impacts quality of life, bladder function, or bowel function.  I reviewed the clinical findings and discussed the pathogenesis extensively, including genetic tendency, aging, menopause and childbirth injuries. Also discussed options for management, including both nonsurgical and surgical options.   Nonsurgical options include both expectant management, pelvic floor physical therapy to prevent progression, and pessary use. I discussed different types of pessary as well as pessary care.  Pessary is less likely to treat her rectocele symptoms or outlet constipation  We reviewed all surgical options including both vaginal and laparoscopic reconstructive approaches, and those using native tissue (nonmesh) and mesh augmented approaches.  We also discussed uterine-sparing approaches and those which involve hysterectomy.  Her only mild uterine descent as well as lack of any postmenopausal bleeding or abnormal Paps, I think a uterine sparing procedure would be optimal.  Given our discussion, her goals, exam findings, and past medical history, I think the best surgical options for her would be: Native tissue posterior repair with possible hysteropexy  She opts for scheduling surgical correction via: Posterior repair, perineorrhaphy, possible hysteropexy, urethral bulking, and indicated procedures.  In addition to my verbal counseling today, detailed written counseling was provided that  detailed the surgical procedures, preoperative information, risks of surgery, postoperative recovery arc.  She was instructed to review these in detail prior to her preoperative visit and to bring questions.      OAB (overactive bladder)  Urge urinary incontinence  History of suburethral sling procedure  S/p Sacral Neuromodulation  - Incomplete response to conservative management, physical therapy, oral medication  - UDS did not show any bladder outlet obstruction  - Cystoscopy overall normal, cytology benign  - s/p sacral neuromodulation full implantation with improvement in symptoms.  Minor reprogramming performed today.  -She also has some newer stress incontinence symptoms.  Since she is planning to undergo a prolapse surgery I think she is an excellent candidate for urethral bulking to add some urethral tone to her prior sling procedure.  This does not use any permanent implanted materials, but employed as a safe hydrogel which allows ingrowth of the patient's own tissue to strengthen the opening of the bladder into the urethra.  This is also a same-day procedure without any postoperative restrictions.  While less invasive, success rates are lower when compared to the sling, with approximately 60% of patients seeing a dramatic improvement in their symptoms, 90% of patients seeing some improvement.  1/4 patients require 2 treatments in order to get optimal therapeutic results.  There is a 10 to 20% chance of needing a temporary Pichardo catheter for 1-2 days after the procedure.      Atrophic vaginitis  Continue vaginal estrogen    Follow up in 2-3 months           Edward Bailey MD, FACOG  Urogynecology and Pelvic Reconstructive Surgery  Department of Obstetrics and Gynecology  [facility]        This medical record contains text that has been entered with the assistance of computer voice recognition and dictation software.  Therefore, it may contain unintended errors  in text, spelling, punctuation, or grammar

## 2025-07-01 ENCOUNTER — OFFICE VISIT (OUTPATIENT)
Dept: GYNECOLOGY | Facility: CLINIC | Age: 60
End: 2025-07-01
Payer: COMMERCIAL

## 2025-07-01 ENCOUNTER — PATIENT MESSAGE (OUTPATIENT)
Dept: MEDICAL GROUP | Facility: LAB | Age: 60
End: 2025-07-01

## 2025-07-01 VITALS
SYSTOLIC BLOOD PRESSURE: 95 MMHG | BODY MASS INDEX: 28.17 KG/M2 | DIASTOLIC BLOOD PRESSURE: 58 MMHG | WEIGHT: 165 LBS | HEART RATE: 60 BPM | HEIGHT: 64 IN

## 2025-07-01 DIAGNOSIS — E66.3 OVERWEIGHT (BMI 25.0-29.9): Primary | ICD-10-CM

## 2025-07-01 DIAGNOSIS — N81.2 INCOMPLETE UTEROVAGINAL PROLAPSE: ICD-10-CM

## 2025-07-01 DIAGNOSIS — N39.46 URINARY INCONTINENCE, MIXED: ICD-10-CM

## 2025-07-01 DIAGNOSIS — N32.81 OAB (OVERACTIVE BLADDER): ICD-10-CM

## 2025-07-01 DIAGNOSIS — K59.02 OUTLET DYSFUNCTION CONSTIPATION: ICD-10-CM

## 2025-07-01 DIAGNOSIS — N81.6 RECTOCELE: Primary | ICD-10-CM

## 2025-07-01 PROCEDURE — 3078F DIAST BP <80 MM HG: CPT | Performed by: STUDENT IN AN ORGANIZED HEALTH CARE EDUCATION/TRAINING PROGRAM

## 2025-07-01 PROCEDURE — 3074F SYST BP LT 130 MM HG: CPT | Performed by: STUDENT IN AN ORGANIZED HEALTH CARE EDUCATION/TRAINING PROGRAM

## 2025-07-01 PROCEDURE — 99214 OFFICE O/P EST MOD 30 MIN: CPT | Performed by: STUDENT IN AN ORGANIZED HEALTH CARE EDUCATION/TRAINING PROGRAM

## 2025-07-01 RX ORDER — TIRZEPATIDE 7.5 MG/.5ML
7.5 INJECTION, SOLUTION SUBCUTANEOUS
Qty: 2 ML | Refills: 0 | Status: SHIPPED | OUTPATIENT
Start: 2025-07-01 | End: 2025-07-23

## 2025-07-01 RX ORDER — TIRZEPATIDE 7.5 MG/.5ML
7.5 INJECTION, SOLUTION SUBCUTANEOUS
Qty: 2 ML | Refills: 0 | Status: SHIPPED | OUTPATIENT
Start: 2025-07-01 | End: 2025-07-01

## 2025-07-01 ASSESSMENT — FIBROSIS 4 INDEX: FIB4 SCORE: 0.93

## 2025-07-01 NOTE — PATIENT INSTRUCTIONS
Pre-op: What you should know before your surgery  Vaginal reconstructive surgery for prolapse       You are scheduled for surgery to fix your prolapse (vaginal bulge) using sutures to suspend your own tissues back into a supported position. These surgeries are minimally invasive, meaning that all incisions are small and hidden inside the vagina. The documents in this packet will outline each part of the surgery. There may be a few “possible” surgeries listed. We use the word “possible” because we tailor the surgery based on your needs. This means we won't know how much surgery you'll need until after you receive anesthesia and fall asleep.     When you fall asleep, the pelvic muscles will relax, allowing us to determine how much surgery you need. We will do as few procedures as possible to fix your prolapse but will address each area as needed.     Goals of prolapse surgery: The primary goal of surgery is to repair the prolapse and eliminate the symptoms of a vaginal bulge and pressure. Depending on your symptoms, the surgery may possibly improve bladder and/or rectal emptying, as well as urinary incontinence.      Prolapse recurrence:  No permanent mesh material will be used to fix prolapse in this procedure. However, since we are relying on your own tissues to heal into place and correct the prolapse, there is a risk that your symptoms may return over time. The majority of patients are satisfied with their repair long-term and do not require any further surgery. However, after non-mesh vaginal surgery, prolapse can eventually return in up to half of women. About 10% of women requiring another treatment. Your personal risk of recurrence/retreatment is based on multiple factors including genetics, your body weight, smoking, frequent heavy lifting, and future vaginal deliveries.      Sexual function:  Following surgical repair, most patients experience improvements in their sexual function. Surgery tends to improve  the general discomfort of sex associated with prolapse. However, if you have a long history of pain with sex (either externally or internally), this is unlikely to be due to prolapse. Therefore, surgery may not improve these symptoms.     Surgery involves the cutting and re-sewing of the vaginal tissues . Scar tissue may form after surgery, which can lead to painful sex for some patients. In many patients, this new pain goes away over time or can be improved with pelvic physical therapy, lubrication, dilators, or vaginal estrogen.     Bladder urgency and incontinence after surgery:  Bladder tests may be performed before your surgery to see whether you are at risk for new or worsening urinary leakage after prolapse repair. Our bladder testing is a great tool to find out who is at risk for urinary leakage, but it is not perfect. There is a chance you may have new or increased leakage with coughing, sneezing, or laughing after surgery. Problems with leakage can be addressed in a number of ways. Bladder urgency and/or frequency often improves after surgery, but it may worsen in some patients. We have various medications and therapies that can help with this urgency after surgery, if necessary    Risk of postoperative pain: Pain after prolapse surgery is a normal part of the healing process, but usually well-tolerated. Common areas of pain include the pelvis, buttocks, hips and back, often related to positioning. Expect to have some pain when you are discharged home. This should improve with time and with use of medications. See “after surgery” instructions for more details on pain control.     General risks of pelvic reconstructive surgery: Specific risks for your procedure are discussed separately  Anesthesia: With modern anesthetics and monitoring equipment, complications due to anesthesia are very rare. Your anesthesiologist will discuss what will be most suitable for you on the day of surgery  Bleeding: All surgery  results in bleeding, however this surgery usually amounts to blood loss between a tablespoon and a teacup. Large amounts of blood loss that requires a blood transfusion are not common (only 1-2% of women) in prolapse surgery.  Blood transfusion, if needed, is safe. Minor reactions like fever or allergy occur in less than 1% of transfusions. Risks of an infection or mismatched blood is very rare (less than 1 out of every 100,000 transfusions).   Infection: The most common infection with prolapse surgery is a urinary tract infection (UTI). This is easily treated. Wound infections occur in 2-3% of patients. Rarely, infection of the abdominal/vaginal wounds may occur, or abscesses in the pelvis may develop. These infections may need to be treated with antibiotics or another procedure to fix them. Risk factors for infections and wound complications include diabetes, smoking, obesity, and other chronic illnesses.  Blood Clots: Clots in the blood vessels of the legs and lungs are potentially dangerous and can occur in patients undergoing prolapse surgery. This is prevented with leg compression during surgery, and sometimes, an injected blood thinner. We strongly encourage you to stay mobile because this is an important way to prevent clots.  Damage to surrounding organs. The pelvic organs are all very close together. The risk of damage to other organs increases if you have had prior pelvic surgeries, as well as a history of endometriosis or pelvic infection. These conditions can cause scar tissue to develop in the pelvis. Scar tissue can cause organs to stick together, making the surgery more difficult.    Ureter and bladder damage: The ureters are tubes that carry urine from the kidneys to the bladder. They travel very close to the uterus and vagina. The bladder is attached to both your uterus and the front of the vagina. Damage/injury can happen during prolapse-repair procedures. A cystoscopy (camera in the bladder) will  be done during your surgery to ensure there is no bladder or ureter damage/injury. If injury occurs, it may require temporary placement of a stent (drainage tube). In rare cases, a larger abdominal incision may be needed to repair the injury. A bladder catheter may need to stay in place temporarily after surgery.  Bowel damage: Bowel damage/injury is uncommon during your surgery. Any damage that is seen in surgery will be fixed. Very rarely, a temporary ostomy (connection of bowel to the front of the abdomen and collection of stool with a bag) is required for a higher-risk bowel injury.  Vascular damage: Major damage to blood vessels is uncommon. If this occurs, it may require a larger surgery and/or blood transfusion.  Fistula: A fistula is an abnormal connection between the vagina and either the bladder or rectum. A fistula leads to leakage of urine or stool. These are rare complications that arise during healing from pelvic surgery that sometimes require additional surgeries to correct. We take great care is during your surgery to prevent these fistulas. If they do occur, your Urogynecologist is the leading expert in fixing them.        Main surgical procedure:  Posterior repair, perineorrhaphy  (fix prolapse of the back of the vagina/rectum and pelvic muscles)        The entire procedure will be completed in a minimally invasive manner, with all incisions inside of the vagina. Once you are asleep, a thorough exam will be performed to confirm the areas needing repair. A cut is made along the back wall of the vagina where the rectum is pushing down, and the skin above the rectum is  from the underlying supportive tissue. This stronger tissue underneath is then repaired using sutures that will dissolve over time. Sometimes excess skin is removed. The skin is then closed.     If the area between the vagina and the anus (called the perineum) is weak, then sutures will be placed to make that area stronger.  "This is called a \"perineorrhaphy.\" This will be just like a repair of an episiotomy or a vaginal tear after having a baby.     The risk of these procedures is similar to those mentioned in the “pre-op” leaflet. However, any surgery to the back wall of the vagina carries a higher risk of pain with sexual intercourse after surgery (up to 10%) due to scar formation. Great care is taken not to narrow the vagina more than necessary. The perineorrhaphy (or episiotomy type of repair) can be uncomfortable and may be difficult to sit normally for up to 6 weeks after surgery. You may use a doughnut cushion to sit on if needed.          Possible Procedure:   Apical repair  (fix prolapse of the top of the vagina/uterus)        Often, the pelvic exam in the office is limited by discomfort and pelvic muscle tension. The exam will be repeated in detail when you are relaxed and asleep with anesthesia. At this time, if your surgeon notes that you have significant prolapse of the top (apex) of the vagina, they will proceed with an apical repair. The repair will be done by lifting the top of the vagina with sutures and connecting it to your existing pelvic structures, such as the sacrospinous ligament, uterosacral ligament, and/or iliococcygeus muscle.     In addition to the general surgical risks listed above, this procedure carries the risk of:  Buttock pain: The suture in the ligament sometimes causes an aching buttock discomfort as it heals. This is temporary, and should resolve over a few weeks. Rarely, this leads to severe pain from nerve entrapment, which is repaired on the same day of surgery by removing the suture.  Hematoma (pooling of blood) in the areas behind the vagina. This is due to the many small blood vessels in this area. You may require a vaginal gauze packing in the vagina after the surgery. More rarely, a large blood clot may require another procedure to drain and repair the area.      Post-op: What to expect " after your surgery      Recovery room  You can expect to stay in the recovery room for a few hours until you are alert. There may be a gauze packing in your vagina, which will be removed before you go home. Pain is normal after surgery but should be tolerable. Your pain will become less over the first 2 weeks. If your pain is difficult to control or you need more nursing care, you will stay in the hospital overnight. Most patients do very well going home on the day of surgery.     Bladder function  You will wake up with a small tube (catheter) in your bladder. A bladder test, called a “void trial”, will be performed by the nurse before you go home. The test is done to make sure you can empty your bladder normally. To do the bladder test, the nurse will fill your bladder with sterile water, remove the catheter, and give you 30 minutes to try and urinate (pee) on your own. If you cannot urinate, or if you only urinate a small amount, you will need to:   Go home with a catheter that stays in your bladder OR  Learn to put a catheter into your bladder a few times a day to empty it    Use of a catheter is temporary and usually needed for 2-4 days. It is very common for the bladder to work slowly, or sluggishly, after this type of surgery. One in every 3-4 patients will require some type of catheterization. An antibiotic may be prescribed while the catheter is in place to decrease the risk of infection.    Call the surgeon for treatment, if you have signs and symptoms of a urinary tract infection, including:  Burning with urination  Bladder pain  Worsening need to urinate right away,  Urine with a bad smell    Vaginal care  Do not go swimming, take sitting baths, and have sexual intercourse for 6 weeks after surgery Do not place anything in your vagina except vaginal estrogen cream, if instructed to do so by your surgeon.     Vaginal discharge and bleeding/spotting is also normal through the entire 6-week recovery.  Sometimes small sutures will fall out of the vagina as they dissolve. This is normal. Contact the office with any heavy bleeding, foul discharge or worsening pain.     Pain management  Surgical pain is controlled in most patients with only acetaminophen (Tylenol) and non-steroidal anti-inflammatory drugs (NSAIDs), such as ibuprofen (Advil). These drugs can be taken together because they do not interact with each other. Check your prescription for specific dosing instructions. A cold compress can help with pain in the vaginal area.  Sometimes, a short course of narcotics, such as oxycodone or hydromorphone is required. We do not recommend using narcotics regularly as it can lead to constipation or dizziness and falls.     Do not drive or operate heavy machinery while using narcotics. You are unlikely to become addicted if you need to take a narcotic medication a few times within the first week of your surgery.  After the first few days to one week, your pain should decrease and you should not have pain severe enough to need narcotics. If you continue having severe pain, contact your surgeon for re-evaluation.     Bowel function  Constipation is common after surgery. This means it may take several days before having a normal stool, or bowel movement. It is important to take extra steps to keep your stools soft to avoid straining with bowel movements. Straining could damage the prolapse repair before it has healed. Most patients will be given a prescription for a stool softener (docusate) as well as a gentle laxative (Miralax or lactulose). These drugs add water to the stool to make it easier to pass. Take them daily throughout your recovery. Hold for a day if you develop diarrhea.     Call us if you experience any repeated episodes of vomiting, worsening abdominal pain/bloating, or are unable to have a bowel movement for more than 3 days.      Activity restrictions  During the first 6 weeks, avoid any type of heavy  lifting that requires straining.  Gentle walking is good exercise. Start with about 10 minutes a day when you feel ready and build up gradually. Avoid repetitive squatting or bending at the waist. Avoid any fitness-type training, aerobics, etc. for at least 6 weeks after surgery. Generally, you will need 4-6 weeks off work. This period may be longer if you have a very physical job.    Return to sex  When your surgeon clears you to resume sex (if desired), begin slowly and use enough lubrication to help ease the discomfort.  Because your vagina and pelvis have been fixed, or re-structured, it can take time to get used to sex after surgery. As scar tissue softens over time, you will feel less discomfort. If the discomfort does not go away, contact the office to schedule an exam and discuss other options. In some cases, your surgeon may prescribe a low dose of vaginal estrogen to help with vaginal dryness and pain that may happen with sex.

## 2025-07-23 DIAGNOSIS — E66.3 OVERWEIGHT (BMI 25.0-29.9): ICD-10-CM

## 2025-07-23 RX ORDER — TIRZEPATIDE 7.5 MG/.5ML
INJECTION, SOLUTION SUBCUTANEOUS
Qty: 2 ML | Refills: 0 | Status: SHIPPED | OUTPATIENT
Start: 2025-07-23

## 2025-07-30 ENCOUNTER — APPOINTMENT (OUTPATIENT)
Dept: ADMISSIONS | Facility: MEDICAL CENTER | Age: 60
End: 2025-07-30
Attending: STUDENT IN AN ORGANIZED HEALTH CARE EDUCATION/TRAINING PROGRAM
Payer: COMMERCIAL

## 2025-08-01 ENCOUNTER — PRE-ADMISSION TESTING (OUTPATIENT)
Dept: ADMISSIONS | Facility: MEDICAL CENTER | Age: 60
End: 2025-08-01
Attending: STUDENT IN AN ORGANIZED HEALTH CARE EDUCATION/TRAINING PROGRAM
Payer: COMMERCIAL

## 2025-08-01 DIAGNOSIS — Z01.812 PRE-OPERATIVE LABORATORY EXAMINATION: Primary | ICD-10-CM

## 2025-08-05 ENCOUNTER — APPOINTMENT (OUTPATIENT)
Dept: ADMISSIONS | Facility: MEDICAL CENTER | Age: 60
End: 2025-08-05
Attending: STUDENT IN AN ORGANIZED HEALTH CARE EDUCATION/TRAINING PROGRAM
Payer: COMMERCIAL

## 2025-08-05 DIAGNOSIS — Z01.812 PRE-OPERATIVE LABORATORY EXAMINATION: ICD-10-CM

## 2025-08-05 LAB
EST. AVERAGE GLUCOSE BLD GHB EST-MCNC: 120 MG/DL
HBA1C MFR BLD: 5.8 % (ref 4–5.6)

## 2025-08-05 PROCEDURE — 83036 HEMOGLOBIN GLYCOSYLATED A1C: CPT

## 2025-08-05 PROCEDURE — 36415 COLL VENOUS BLD VENIPUNCTURE: CPT

## 2025-08-07 ENCOUNTER — TELEMEDICINE (OUTPATIENT)
Dept: GYNECOLOGY | Facility: CLINIC | Age: 60
End: 2025-08-07
Payer: COMMERCIAL

## 2025-08-07 DIAGNOSIS — N39.3 STRESS INCONTINENCE: Primary | ICD-10-CM

## 2025-08-07 DIAGNOSIS — K59.02 OUTLET DYSFUNCTION CONSTIPATION: ICD-10-CM

## 2025-08-07 DIAGNOSIS — N81.6 RECTOCELE: ICD-10-CM

## 2025-08-07 PROCEDURE — 99214 OFFICE O/P EST MOD 30 MIN: CPT | Mod: 95 | Performed by: STUDENT IN AN ORGANIZED HEALTH CARE EDUCATION/TRAINING PROGRAM

## 2025-08-10 PROBLEM — N39.3 SUI (STRESS URINARY INCONTINENCE, FEMALE): Status: ACTIVE | Noted: 2025-08-10

## 2025-08-11 ENCOUNTER — ANESTHESIA (OUTPATIENT)
Facility: MEDICAL CENTER | Age: 60
End: 2025-08-11
Payer: COMMERCIAL

## 2025-08-11 ENCOUNTER — APPOINTMENT (OUTPATIENT)
Facility: MEDICAL CENTER | Age: 60
End: 2025-08-11
Attending: PSYCHIATRY & NEUROLOGY
Payer: COMMERCIAL

## 2025-08-11 ENCOUNTER — HOSPITAL ENCOUNTER (OUTPATIENT)
Facility: MEDICAL CENTER | Age: 60
End: 2025-08-11
Attending: STUDENT IN AN ORGANIZED HEALTH CARE EDUCATION/TRAINING PROGRAM | Admitting: STUDENT IN AN ORGANIZED HEALTH CARE EDUCATION/TRAINING PROGRAM
Payer: COMMERCIAL

## 2025-08-11 ENCOUNTER — ANESTHESIA EVENT (OUTPATIENT)
Facility: MEDICAL CENTER | Age: 60
End: 2025-08-11
Payer: COMMERCIAL

## 2025-08-11 VITALS
RESPIRATION RATE: 16 BRPM | SYSTOLIC BLOOD PRESSURE: 110 MMHG | DIASTOLIC BLOOD PRESSURE: 62 MMHG | HEIGHT: 64 IN | OXYGEN SATURATION: 93 % | BODY MASS INDEX: 26.38 KG/M2 | TEMPERATURE: 97.3 F | HEART RATE: 71 BPM | WEIGHT: 154.54 LBS

## 2025-08-11 LAB — CYTOLOGY REG CYTOL: NORMAL

## 2025-08-11 PROCEDURE — 51715 ENDOSCOPIC INJECTION/IMPLANT: CPT | Performed by: STUDENT IN AN ORGANIZED HEALTH CARE EDUCATION/TRAINING PROGRAM

## 2025-08-11 PROCEDURE — 160193 HCHG PACU STANDARD - 1ST 60 MINS: Performed by: STUDENT IN AN ORGANIZED HEALTH CARE EDUCATION/TRAINING PROGRAM

## 2025-08-11 PROCEDURE — 700101 HCHG RX REV CODE 250: Performed by: STUDENT IN AN ORGANIZED HEALTH CARE EDUCATION/TRAINING PROGRAM

## 2025-08-11 PROCEDURE — 700111 HCHG RX REV CODE 636 W/ 250 OVERRIDE (IP): Performed by: ANESTHESIOLOGY

## 2025-08-11 PROCEDURE — 88112 CYTOPATH CELL ENHANCE TECH: CPT | Performed by: PATHOLOGY

## 2025-08-11 PROCEDURE — 160048 HCHG OR STATISTICAL LEVEL 1-5: Performed by: STUDENT IN AN ORGANIZED HEALTH CARE EDUCATION/TRAINING PROGRAM

## 2025-08-11 PROCEDURE — 88112 CYTOPATH CELL ENHANCE TECH: CPT | Mod: 26 | Performed by: PATHOLOGY

## 2025-08-11 PROCEDURE — 160002 HCHG RECOVERY MINUTES (STAT): Performed by: STUDENT IN AN ORGANIZED HEALTH CARE EDUCATION/TRAINING PROGRAM

## 2025-08-11 PROCEDURE — 700102 HCHG RX REV CODE 250 W/ 637 OVERRIDE(OP): Performed by: STUDENT IN AN ORGANIZED HEALTH CARE EDUCATION/TRAINING PROGRAM

## 2025-08-11 PROCEDURE — 160025 RECOVERY II MINUTES (STATS): Performed by: STUDENT IN AN ORGANIZED HEALTH CARE EDUCATION/TRAINING PROGRAM

## 2025-08-11 PROCEDURE — 160046 HCHG PACU - 1ST 60 MINS PHASE II: Performed by: STUDENT IN AN ORGANIZED HEALTH CARE EDUCATION/TRAINING PROGRAM

## 2025-08-11 PROCEDURE — L8606 SYNTHETIC IMPLNT URINARY 1ML: HCPCS | Performed by: STUDENT IN AN ORGANIZED HEALTH CARE EDUCATION/TRAINING PROGRAM

## 2025-08-11 PROCEDURE — 110371 HCHG SHELL REV 272: Performed by: STUDENT IN AN ORGANIZED HEALTH CARE EDUCATION/TRAINING PROGRAM

## 2025-08-11 PROCEDURE — 160015 HCHG STAT PREOP MINUTES: Performed by: STUDENT IN AN ORGANIZED HEALTH CARE EDUCATION/TRAINING PROGRAM

## 2025-08-11 PROCEDURE — 160029 HCHG SURGERY MINUTES - 1ST 30 MINS LEVEL 4: Performed by: STUDENT IN AN ORGANIZED HEALTH CARE EDUCATION/TRAINING PROGRAM

## 2025-08-11 PROCEDURE — 160191 HCHG ANESTHESIA STANDARD: Performed by: STUDENT IN AN ORGANIZED HEALTH CARE EDUCATION/TRAINING PROGRAM

## 2025-08-11 PROCEDURE — 57265 CMBN AP COLPRHY W/NTRCL RPR: CPT | Performed by: STUDENT IN AN ORGANIZED HEALTH CARE EDUCATION/TRAINING PROGRAM

## 2025-08-11 PROCEDURE — 57265 CMBN AP COLPRHY W/NTRCL RPR: CPT | Mod: AS | Performed by: NURSE PRACTITIONER

## 2025-08-11 PROCEDURE — 700105 HCHG RX REV CODE 258: Performed by: ANESTHESIOLOGY

## 2025-08-11 PROCEDURE — 160041 HCHG SURGERY MINUTES - EA ADDL 1 MIN LEVEL 4: Performed by: STUDENT IN AN ORGANIZED HEALTH CARE EDUCATION/TRAINING PROGRAM

## 2025-08-11 PROCEDURE — 700101 HCHG RX REV CODE 250: Performed by: ANESTHESIOLOGY

## 2025-08-11 PROCEDURE — A9270 NON-COVERED ITEM OR SERVICE: HCPCS | Performed by: STUDENT IN AN ORGANIZED HEALTH CARE EDUCATION/TRAINING PROGRAM

## 2025-08-11 DEVICE — SYSTEM KIT BULKAMID URETHRAL BULKING (1/EA) ***MUST ORDER A MIN OF 5 EA***: Type: IMPLANTABLE DEVICE | Site: VAGINA | Status: FUNCTIONAL

## 2025-08-11 RX ORDER — HALOPERIDOL 5 MG/ML
1 INJECTION INTRAMUSCULAR
Status: DISCONTINUED | OUTPATIENT
Start: 2025-08-11 | End: 2025-08-11 | Stop reason: HOSPADM

## 2025-08-11 RX ORDER — DEXAMETHASONE SODIUM PHOSPHATE 4 MG/ML
INJECTION, SOLUTION INTRA-ARTICULAR; INTRALESIONAL; INTRAMUSCULAR; INTRAVENOUS; SOFT TISSUE PRN
Status: DISCONTINUED | OUTPATIENT
Start: 2025-08-11 | End: 2025-08-11 | Stop reason: SURG

## 2025-08-11 RX ORDER — MEPERIDINE HYDROCHLORIDE 25 MG/ML
6.25 INJECTION INTRAMUSCULAR; INTRAVENOUS; SUBCUTANEOUS
Status: DISCONTINUED | OUTPATIENT
Start: 2025-08-11 | End: 2025-08-11 | Stop reason: HOSPADM

## 2025-08-11 RX ORDER — ACETAMINOPHEN 500 MG
1000 TABLET ORAL ONCE
Status: COMPLETED | OUTPATIENT
Start: 2025-08-11 | End: 2025-08-11

## 2025-08-11 RX ORDER — MIDAZOLAM HYDROCHLORIDE 1 MG/ML
INJECTION INTRAMUSCULAR; INTRAVENOUS PRN
Status: DISCONTINUED | OUTPATIENT
Start: 2025-08-11 | End: 2025-08-11 | Stop reason: SURG

## 2025-08-11 RX ORDER — EPHEDRINE SULFATE 50 MG/ML
INJECTION, SOLUTION INTRAVENOUS PRN
Status: DISCONTINUED | OUTPATIENT
Start: 2025-08-11 | End: 2025-08-11 | Stop reason: SURG

## 2025-08-11 RX ORDER — CEFAZOLIN SODIUM 1 G/3ML
INJECTION, POWDER, FOR SOLUTION INTRAMUSCULAR; INTRAVENOUS PRN
Status: DISCONTINUED | OUTPATIENT
Start: 2025-08-11 | End: 2025-08-11 | Stop reason: SURG

## 2025-08-11 RX ORDER — SODIUM CHLORIDE, SODIUM LACTATE, POTASSIUM CHLORIDE, CALCIUM CHLORIDE 600; 310; 30; 20 MG/100ML; MG/100ML; MG/100ML; MG/100ML
INJECTION, SOLUTION INTRAVENOUS
Status: DISCONTINUED | OUTPATIENT
Start: 2025-08-11 | End: 2025-08-11 | Stop reason: SURG

## 2025-08-11 RX ORDER — ONDANSETRON 2 MG/ML
4 INJECTION INTRAMUSCULAR; INTRAVENOUS
Status: DISCONTINUED | OUTPATIENT
Start: 2025-08-11 | End: 2025-08-11 | Stop reason: HOSPADM

## 2025-08-11 RX ORDER — ONDANSETRON 2 MG/ML
INJECTION INTRAMUSCULAR; INTRAVENOUS PRN
Status: DISCONTINUED | OUTPATIENT
Start: 2025-08-11 | End: 2025-08-11 | Stop reason: SURG

## 2025-08-11 RX ORDER — PHENAZOPYRIDINE HYDROCHLORIDE 200 MG/1
200 TABLET, FILM COATED ORAL ONCE
Status: COMPLETED | OUTPATIENT
Start: 2025-08-11 | End: 2025-08-11

## 2025-08-11 RX ORDER — DIPHENHYDRAMINE HYDROCHLORIDE 50 MG/ML
12.5 INJECTION, SOLUTION INTRAMUSCULAR; INTRAVENOUS
Status: DISCONTINUED | OUTPATIENT
Start: 2025-08-11 | End: 2025-08-11 | Stop reason: HOSPADM

## 2025-08-11 RX ORDER — SODIUM CHLORIDE, SODIUM LACTATE, POTASSIUM CHLORIDE, CALCIUM CHLORIDE 600; 310; 30; 20 MG/100ML; MG/100ML; MG/100ML; MG/100ML
INJECTION, SOLUTION INTRAVENOUS CONTINUOUS
Status: ACTIVE | OUTPATIENT
Start: 2025-08-11 | End: 2025-08-11

## 2025-08-11 RX ORDER — KETOROLAC TROMETHAMINE 15 MG/ML
INJECTION, SOLUTION INTRAMUSCULAR; INTRAVENOUS PRN
Status: DISCONTINUED | OUTPATIENT
Start: 2025-08-11 | End: 2025-08-11 | Stop reason: SURG

## 2025-08-11 RX ORDER — SCOPOLAMINE 1 MG/3D
1 PATCH, EXTENDED RELEASE TRANSDERMAL
Status: DISCONTINUED | OUTPATIENT
Start: 2025-08-11 | End: 2025-08-11 | Stop reason: HOSPADM

## 2025-08-11 RX ORDER — ACETAMINOPHEN 500 MG
1000 TABLET ORAL
COMMUNITY

## 2025-08-11 RX ORDER — LIDOCAINE HYDROCHLORIDE 20 MG/ML
INJECTION, SOLUTION EPIDURAL; INFILTRATION; INTRACAUDAL; PERINEURAL PRN
Status: DISCONTINUED | OUTPATIENT
Start: 2025-08-11 | End: 2025-08-11 | Stop reason: SURG

## 2025-08-11 RX ORDER — PHENYLEPHRINE HCL IN 0.9% NACL 1 MG/10 ML
SYRINGE (ML) INTRAVENOUS PRN
Status: DISCONTINUED | OUTPATIENT
Start: 2025-08-11 | End: 2025-08-11 | Stop reason: SURG

## 2025-08-11 RX ORDER — SODIUM CHLORIDE, SODIUM LACTATE, POTASSIUM CHLORIDE, CALCIUM CHLORIDE 600; 310; 30; 20 MG/100ML; MG/100ML; MG/100ML; MG/100ML
INJECTION, SOLUTION INTRAVENOUS CONTINUOUS
Status: DISCONTINUED | OUTPATIENT
Start: 2025-08-11 | End: 2025-08-11 | Stop reason: HOSPADM

## 2025-08-11 RX ADMIN — FENTANYL CITRATE 50 MCG: 50 INJECTION, SOLUTION INTRAMUSCULAR; INTRAVENOUS at 11:32

## 2025-08-11 RX ADMIN — SCOPOLAMINE 1 PATCH: 1.5 PATCH, EXTENDED RELEASE TRANSDERMAL at 09:37

## 2025-08-11 RX ADMIN — MIDAZOLAM HYDROCHLORIDE 2 MG: 1 INJECTION, SOLUTION INTRAMUSCULAR; INTRAVENOUS at 10:21

## 2025-08-11 RX ADMIN — Medication 100 MCG: at 10:55

## 2025-08-11 RX ADMIN — ACETAMINOPHEN 1000 MG: 500 TABLET ORAL at 09:37

## 2025-08-11 RX ADMIN — EPHEDRINE SULFATE 5 MG: 50 INJECTION, SOLUTION INTRAVENOUS at 11:54

## 2025-08-11 RX ADMIN — FENTANYL CITRATE 50 MCG: 50 INJECTION, SOLUTION INTRAMUSCULAR; INTRAVENOUS at 10:25

## 2025-08-11 RX ADMIN — PROPOFOL 150 MG: 10 INJECTION, EMULSION INTRAVENOUS at 10:28

## 2025-08-11 RX ADMIN — ONDANSETRON 4 MG: 2 INJECTION INTRAMUSCULAR; INTRAVENOUS at 11:52

## 2025-08-11 RX ADMIN — CEFAZOLIN 2 G: 1 INJECTION, POWDER, FOR SOLUTION INTRAMUSCULAR; INTRAVENOUS at 10:34

## 2025-08-11 RX ADMIN — Medication 100 MCG: at 10:43

## 2025-08-11 RX ADMIN — PHENAZOPYRIDINE 200 MG: 200 TABLET ORAL at 09:37

## 2025-08-11 RX ADMIN — PROPOFOL 20 MG: 10 INJECTION, EMULSION INTRAVENOUS at 11:05

## 2025-08-11 RX ADMIN — DEXAMETHASONE SODIUM PHOSPHATE 8 MG: 4 INJECTION INTRA-ARTICULAR; INTRALESIONAL; INTRAMUSCULAR; INTRAVENOUS; SOFT TISSUE at 10:35

## 2025-08-11 RX ADMIN — SODIUM CHLORIDE, POTASSIUM CHLORIDE, SODIUM LACTATE AND CALCIUM CHLORIDE: 600; 310; 30; 20 INJECTION, SOLUTION INTRAVENOUS at 10:21

## 2025-08-11 RX ADMIN — LIDOCAINE HYDROCHLORIDE 50 MG: 20 INJECTION, SOLUTION EPIDURAL; INFILTRATION; INTRACAUDAL; PERINEURAL at 10:28

## 2025-08-11 RX ADMIN — KETOROLAC TROMETHAMINE 15 MG: 15 INJECTION, SOLUTION INTRAMUSCULAR; INTRAVENOUS at 11:52

## 2025-08-11 ASSESSMENT — FIBROSIS 4 INDEX: FIB4 SCORE: 0.93

## 2025-08-12 ENCOUNTER — TELEPHONE (OUTPATIENT)
Dept: GYNECOLOGY | Facility: CLINIC | Age: 60
End: 2025-08-12
Payer: COMMERCIAL

## 2025-08-12 ENCOUNTER — TELEPHONE (OUTPATIENT)
Dept: OBGYN | Facility: CLINIC | Age: 60
End: 2025-08-12
Payer: COMMERCIAL

## 2025-08-13 ENCOUNTER — NON-PROVIDER VISIT (OUTPATIENT)
Dept: OBGYN | Facility: CLINIC | Age: 60
End: 2025-08-13
Payer: COMMERCIAL

## 2025-08-15 DIAGNOSIS — E66.3 OVERWEIGHT (BMI 25.0-29.9): ICD-10-CM

## 2025-08-18 RX ORDER — TIRZEPATIDE 7.5 MG/.5ML
7.5 INJECTION, SOLUTION SUBCUTANEOUS
Qty: 2 ML | Refills: 0 | Status: SHIPPED | OUTPATIENT
Start: 2025-08-18 | End: 2025-08-25

## 2025-08-22 ENCOUNTER — PATIENT MESSAGE (OUTPATIENT)
Dept: MEDICAL GROUP | Facility: LAB | Age: 60
End: 2025-08-22
Payer: COMMERCIAL

## 2025-08-25 RX ORDER — TIRZEPATIDE 10 MG/.5ML
10 INJECTION, SOLUTION SUBCUTANEOUS
Qty: 2 ML | Refills: 3 | Status: SHIPPED | OUTPATIENT
Start: 2025-08-25

## 2025-08-29 ENCOUNTER — TELEPHONE (OUTPATIENT)
Dept: GYNECOLOGY | Facility: CLINIC | Age: 60
End: 2025-08-29
Payer: COMMERCIAL

## 2025-09-06 ENCOUNTER — APPOINTMENT (OUTPATIENT)
Dept: SLEEP MEDICINE | Facility: MEDICAL CENTER | Age: 60
End: 2025-09-06
Attending: PHYSICIAN ASSISTANT
Payer: COMMERCIAL

## 2025-09-07 ENCOUNTER — APPOINTMENT (OUTPATIENT)
Dept: SLEEP MEDICINE | Facility: MEDICAL CENTER | Age: 60
End: 2025-09-07
Attending: PHYSICIAN ASSISTANT
Payer: COMMERCIAL

## (undated) DEVICE — GOWN WARMING STANDARD FLEX - (30/CA)

## (undated) DEVICE — SENSOR OXIMETER ADULT SPO2 RD SET (20EA/BX)

## (undated) DEVICE — SUCTION INSTRUMENT YANKAUER BULBOUS TIP W/O VENT (50EA/CA)

## (undated) DEVICE — LACTATED RINGERS INJ 1000 ML - (14EA/CA 60CA/PF)

## (undated) DEVICE — SET LEADWIRE 5 LEAD BEDSIDE DISPOSABLE ECG (1SET OF 5/EA)

## (undated) DEVICE — ADHESIVE MASTISOL - (48/BX)

## (undated) DEVICE — TUBE CONNECTING SUCTION - CLEAR PLASTIC STERILE 72 IN (50EA/CA)

## (undated) DEVICE — MASK OXYGEN VNYL ADLT MED CONC WITH 7 FOOT TUBING  - (50EA/CA)

## (undated) DEVICE — ARMREST CRADLE FOAM - (2PR/PK 12PR/CA)

## (undated) DEVICE — SUTURE 2-0 STRATAFIX SPIRAL PDS SH (12EA/BX)

## (undated) DEVICE — GLOVE BIOGEL SZ 6.5 SURGICAL PF LTX (50PR/BX 4BX/CA)

## (undated) DEVICE — SET IRRIGATION CYSTOSCOPY TUBE L80 IN (20EA/CA)

## (undated) DEVICE — COVER LIGHT HANDLE ALC PLUS DISP (18EA/BX)

## (undated) DEVICE — SPONGE GAUZESTER 4 X 4 4PLY - (128PK/CA)

## (undated) DEVICE — SUTURE 0 VICRYL PLUS CT-2 - 27 INCH (36/BX)

## (undated) DEVICE — DRAPE MAYO STAND - (30/CA)

## (undated) DEVICE — TUBING CLEARLINK DUO-VENT - C-FLO (48EA/CA)

## (undated) DEVICE — CATHETER URETHRAL FOLEY SILICONE OD14 FR 10 ML (10EA/CA)

## (undated) DEVICE — TOWEL STOP TIMEOUT SAFETY FLAG (40EA/CA)

## (undated) DEVICE — SET TUBING AQUILEX IN-FLOW MYOSURE (10EA/BX)

## (undated) DEVICE — ELECTRODE DUAL RETURN W/ CORD - (50/PK)

## (undated) DEVICE — DERMABOND ADVANCED - (12EA/BX)

## (undated) DEVICE — GLOVE BIOGEL SZ 7 SURGICAL PF LTX - (50PR/BX 4BX/CA)

## (undated) DEVICE — WATER IRRIGATION STERILE 1000ML (12EA/CA)

## (undated) DEVICE — CANISTER SUCTION 3000ML MECHANICAL FILTER AUTO SHUTOFF MEDI-VAC NONSTERILE LF DISP  (40EA/CA)

## (undated) DEVICE — BRIEF STRETCH MATERNITY M/L - FITS 20-60IN (5EA/BG 20BG/CA)

## (undated) DEVICE — Device

## (undated) DEVICE — TRAY FOLEY CATHETER STATLOCK 16FR SURESTEP (10EA/CA)

## (undated) DEVICE — SLEEVE VASO CALF MED - (10PR/CA)

## (undated) DEVICE — JELLY SURGILUBE STERILE TUBE 4.25 OZ (1/EA)

## (undated) DEVICE — CLOSURE SKIN STRIP 1/2 X 4 IN - (STERI STRIP) (50/BX 4BX/CA)

## (undated) DEVICE — WATER IRRIG. STER 3000 ML - (4/CA)

## (undated) DEVICE — DRAPE 36X28IN RAD CARM BND BG - (25/CA) O

## (undated) DEVICE — DRAPE LAPAROTOMY T SHEET - (12EA/CA)

## (undated) DEVICE — TRAY SRGPRP PVP IOD WT PRP - (20/CA)

## (undated) DEVICE — PAD SANITARY 11IN MAXI IND WRAPPED  (12EA/PK 24PK/CA)

## (undated) DEVICE — CANISTER SUCTION 3000ML MECHANICAL FILTER AUTO SHUTOFF MEDI-VAC NONSTERILE LF DISP (40EA/CA)

## (undated) DEVICE — MASK OXYGEN VNYL ADLT MED CONC WITH 7 FOOT TUBING - (50EA/CA)

## (undated) DEVICE — SODIUM CHL IRRIGATION 0.9% 1000ML (12EA/CA)

## (undated) DEVICE — SUTURE 3-0 VICRYL PLUS SH - 8X 18 INCH (12/BX)

## (undated) DEVICE — DRESSING TRANSPARENT FILM TEGADERM 4 X 4.75" (50EA/BX)"

## (undated) DEVICE — SLEEVE, VASO, THIGH, MED

## (undated) DEVICE — SUTURE 4-0 MONOCRYL PLUS PS-2 - 27 INCH (36/BX)

## (undated) DEVICE — SCRUB SOLUTION EXIDINE 4% 40Z - 48/CS CHLORAHEXADINE GLUCONATE

## (undated) DEVICE — KIT  I.V. START (100EA/CA)

## (undated) DEVICE — CANNULA O2 COMFORT SOFT EAR ADULT 7 FT TUBING (50/CA)

## (undated) DEVICE — GLOVE BIOGEL INDICATOR SZ 7.5 SURGICAL PF LTX - (50PR/BX 4BX/CA)

## (undated) DEVICE — NEEDLE SPINAL NON-SAFETY 22GA X 7 (10/BX)

## (undated) DEVICE — DRESSING TRANSPARENT FILM TEGADERM 2.375 X 2.75"  (100EA/BX)"

## (undated) DEVICE — REMOTE CONTROL

## (undated) DEVICE — HEADREST PRONEVIEW LARGE - (10/CA)

## (undated) DEVICE — DRAPE LARGE 3 QUARTER - (20/CA)

## (undated) DEVICE — STERI STRIP COMPOUND BENZOIN - TINCTURE 0.6ML WITH APPLICATOR (40EA/BX)

## (undated) DEVICE — SUTURE 2-0 VICRYL PLUS SH - 27 INCH (36/BX)

## (undated) DEVICE — GLOVE BIOGEL INDICATOR SZ 7SURGICAL PF LTX - (50/BX 4BX/CA)

## (undated) DEVICE — SET TUBING AQUILEX OUT-FLOW MYOSURE (10EA/BX)

## (undated) DEVICE — KIT CANISTER AQUILEX FLUID CONTROL SYSTEM X (36EA/BX)

## (undated) DEVICE — SUTURE GENERAL

## (undated) DEVICE — BLADE SURGICAL #11 - (50/BX)

## (undated) DEVICE — CANISTER SUCTION RIGID RED 1500CC (40EA/CA)

## (undated) DEVICE — SET EXTENSION WITH 2 PORTS (48EA/CA) ***PART #2C8610 IS A SUBSTITUTE*****

## (undated) DEVICE — PAD SANITARY 11IN MAXI IND WRAPPED (12EA/PK 24PK/CA)

## (undated) DEVICE — PACK MINOR BASIN - (2EA/CA)

## (undated) DEVICE — SODIUM CHL. IRRIGATION 0.9% 3000ML (4EA/CA 65CA/PF)

## (undated) DEVICE — CHLORAPREP 26 ML APPLICATOR - ORANGE TINT(25/CA)

## (undated) DEVICE — SPONGE GAUZESTER. 2X2 4-PL - (2/PK 50PK/BX 30BX/CS)

## (undated) DEVICE — GOWN SURGEONS X-LARGE - DISP. (30/CA)

## (undated) DEVICE — SLEEVE VASO DVT COMPRESSION CALF MED - (10PR/CA)

## (undated) DEVICE — SUTURE 3-0 VICRYL PLUS SH - 27 INCH (36/BX)

## (undated) DEVICE — KIT ULTRASND COVER - (20EA/CA)